# Patient Record
Sex: MALE | Race: WHITE | NOT HISPANIC OR LATINO | Employment: OTHER | URBAN - METROPOLITAN AREA
[De-identification: names, ages, dates, MRNs, and addresses within clinical notes are randomized per-mention and may not be internally consistent; named-entity substitution may affect disease eponyms.]

---

## 2018-01-29 ENCOUNTER — OFFICE VISIT CONVERTED (OUTPATIENT)
Dept: ORTHOPEDIC SURGERY | Facility: CLINIC | Age: 63
End: 2018-01-29
Attending: ORTHOPAEDIC SURGERY

## 2019-02-25 ENCOUNTER — HOSPITAL ENCOUNTER (OUTPATIENT)
Dept: SLEEP MEDICINE | Facility: HOSPITAL | Age: 64
Discharge: HOME | End: 2019-02-25
Attending: INTERNAL MEDICINE

## 2019-04-02 ENCOUNTER — OFFICE VISIT CONVERTED (OUTPATIENT)
Dept: SURGERY | Facility: CLINIC | Age: 64
End: 2019-04-02
Attending: SURGERY

## 2019-05-09 ENCOUNTER — HOSPITAL ENCOUNTER (OUTPATIENT)
Dept: PERIOP | Facility: HOSPITAL | Age: 64
Setting detail: HOSPITAL OUTPATIENT SURGERY
Discharge: HOME | End: 2019-05-09
Attending: SURGERY

## 2019-05-09 LAB
ANION GAP SERPL CALC-SCNC: 15 MMOL/L (ref 8–19)
BUN SERPL-MCNC: 12 MG/DL (ref 5–25)
BUN/CREAT SERPL: 11 {RATIO} (ref 6–20)
CALCIUM SERPL-MCNC: 8.8 MG/DL (ref 8.7–10.4)
CHLORIDE SERPL-SCNC: 104 MMOL/L (ref 99–111)
CONV CO2: 25 MMOL/L (ref 22–32)
CREAT UR-MCNC: 1.05 MG/DL (ref 0.7–1.2)
GFR SERPLBLD BASED ON 1.73 SQ M-ARVRAT: >60 ML/MIN/{1.73_M2}
GLUCOSE SERPL-MCNC: 111 MG/DL (ref 70–99)
OSMOLALITY SERPL CALC.SUM OF ELEC: 290 MOSM/KG (ref 273–304)
POTASSIUM SERPL-SCNC: 4.2 MMOL/L (ref 3.5–5.3)
SODIUM SERPL-SCNC: 140 MMOL/L (ref 135–147)

## 2019-05-16 ENCOUNTER — OFFICE VISIT CONVERTED (OUTPATIENT)
Dept: SURGERY | Facility: CLINIC | Age: 64
End: 2019-05-16
Attending: SURGERY

## 2019-05-23 ENCOUNTER — CONVERSION ENCOUNTER (OUTPATIENT)
Dept: SURGERY | Facility: CLINIC | Age: 64
End: 2019-05-23

## 2019-05-23 ENCOUNTER — OFFICE VISIT CONVERTED (OUTPATIENT)
Dept: SURGERY | Facility: CLINIC | Age: 64
End: 2019-05-23
Attending: SURGERY

## 2019-07-16 ENCOUNTER — HOSPITAL ENCOUNTER (OUTPATIENT)
Dept: LAB | Facility: HOSPITAL | Age: 64
Discharge: HOME | End: 2019-07-16
Attending: INTERNAL MEDICINE

## 2019-07-16 LAB
25(OH)D3 SERPL-MCNC: 57.3 NG/ML (ref 30–100)
ALBUMIN SERPL-MCNC: 4.4 G/DL (ref 3.5–5)
ALBUMIN/GLOB SERPL: 1.7 {RATIO} (ref 1.4–2.6)
ALP SERPL-CCNC: 59 U/L (ref 56–155)
ALT SERPL-CCNC: 29 U/L (ref 10–40)
ANION GAP SERPL CALC-SCNC: 17 MMOL/L (ref 8–19)
AST SERPL-CCNC: 26 U/L (ref 15–50)
BASOPHILS # BLD AUTO: 0.03 10*3/UL (ref 0–0.2)
BASOPHILS NFR BLD AUTO: 0.5 % (ref 0–3)
BILIRUB SERPL-MCNC: 0.72 MG/DL (ref 0.2–1.3)
BUN SERPL-MCNC: 11 MG/DL (ref 5–25)
BUN/CREAT SERPL: 12 {RATIO} (ref 6–20)
CALCIUM SERPL-MCNC: 9 MG/DL (ref 8.7–10.4)
CHLORIDE SERPL-SCNC: 102 MMOL/L (ref 99–111)
CONV ABS IMM GRAN: 0.01 10*3/UL (ref 0–0.2)
CONV CO2: 25 MMOL/L (ref 22–32)
CONV IMMATURE GRAN: 0.2 % (ref 0–1.8)
CONV TOTAL PROTEIN: 7 G/DL (ref 6.3–8.2)
CREAT UR-MCNC: 0.95 MG/DL (ref 0.7–1.2)
DEPRECATED RDW RBC AUTO: 45.5 FL (ref 35.1–43.9)
EOSINOPHIL # BLD AUTO: 0.11 10*3/UL (ref 0–0.7)
EOSINOPHIL # BLD AUTO: 1.8 % (ref 0–7)
ERYTHROCYTE [DISTWIDTH] IN BLOOD BY AUTOMATED COUNT: 13.2 % (ref 11.6–14.4)
FOLATE SERPL-MCNC: 16.3 NG/ML (ref 4.8–20)
GFR SERPLBLD BASED ON 1.73 SQ M-ARVRAT: >60 ML/MIN/{1.73_M2}
GLOBULIN UR ELPH-MCNC: 2.6 G/DL (ref 2–3.5)
GLUCOSE SERPL-MCNC: 100 MG/DL (ref 70–99)
HBA1C MFR BLD: 17.7 G/DL (ref 14–18)
HCT VFR BLD AUTO: 52 % (ref 42–52)
LYMPHOCYTES # BLD AUTO: 1.87 10*3/UL (ref 1–5)
MCH RBC QN AUTO: 31.5 PG (ref 27–31)
MCHC RBC AUTO-ENTMCNC: 34 G/DL (ref 33–37)
MCV RBC AUTO: 92.5 FL (ref 80–96)
MONOCYTES # BLD AUTO: 0.45 10*3/UL (ref 0.2–1.2)
MONOCYTES NFR BLD AUTO: 7.2 % (ref 3–10)
NEUTROPHILS # BLD AUTO: 3.8 10*3/UL (ref 2–8)
NEUTROPHILS NFR BLD AUTO: 60.5 % (ref 30–85)
NRBC CBCN: 0 % (ref 0–0.7)
OSMOLALITY SERPL CALC.SUM OF ELEC: 289 MOSM/KG (ref 273–304)
PLATELET # BLD AUTO: 171 10*3/UL (ref 130–400)
PMV BLD AUTO: 11.2 FL (ref 9.4–12.4)
POTASSIUM SERPL-SCNC: 4.1 MMOL/L (ref 3.5–5.3)
RBC # BLD AUTO: 5.62 10*6/UL (ref 4.7–6.1)
SODIUM SERPL-SCNC: 140 MMOL/L (ref 135–147)
T4 FREE SERPL-MCNC: 1.1 NG/DL (ref 0.9–1.8)
TESTOST SERPL-MCNC: 838 NG/DL (ref 193–740)
TSH SERPL-ACNC: 0.75 M[IU]/L (ref 0.27–4.2)
VARIANT LYMPHS NFR BLD MANUAL: 29.8 % (ref 20–45)
VIT B12 SERPL-MCNC: 305 PG/ML (ref 211–911)
WBC # BLD AUTO: 6.27 10*3/UL (ref 4.8–10.8)

## 2019-09-04 ENCOUNTER — HOSPITAL ENCOUNTER (OUTPATIENT)
Dept: SURGERY | Facility: HOSPITAL | Age: 64
Setting detail: HOSPITAL OUTPATIENT SURGERY
Discharge: HOME | End: 2019-09-04
Attending: SURGERY

## 2019-11-04 ENCOUNTER — HOSPITAL ENCOUNTER (OUTPATIENT)
Dept: LAB | Facility: HOSPITAL | Age: 64
Discharge: HOME | End: 2019-11-04
Attending: INTERNAL MEDICINE

## 2019-11-04 LAB
ALBUMIN SERPL-MCNC: 4.5 G/DL (ref 3.5–5)
ALBUMIN/GLOB SERPL: 1.8 {RATIO} (ref 1.4–2.6)
ALP SERPL-CCNC: 48 U/L (ref 56–155)
ALT SERPL-CCNC: 31 U/L (ref 10–40)
ANION GAP SERPL CALC-SCNC: 22 MMOL/L (ref 8–19)
AST SERPL-CCNC: 26 U/L (ref 15–50)
BASOPHILS # BLD AUTO: 0.03 10*3/UL (ref 0–0.2)
BASOPHILS NFR BLD AUTO: 0.7 % (ref 0–3)
BILIRUB SERPL-MCNC: 0.79 MG/DL (ref 0.2–1.3)
BUN SERPL-MCNC: 13 MG/DL (ref 5–25)
BUN/CREAT SERPL: 11 {RATIO} (ref 6–20)
CALCIUM SERPL-MCNC: 9.4 MG/DL (ref 8.7–10.4)
CHLORIDE SERPL-SCNC: 102 MMOL/L (ref 99–111)
CHOLEST SERPL-MCNC: 111 MG/DL (ref 107–200)
CHOLEST/HDLC SERPL: 2.5 {RATIO} (ref 3–6)
CONV ABS IMM GRAN: 0.01 10*3/UL (ref 0–0.2)
CONV CO2: 23 MMOL/L (ref 22–32)
CONV IMMATURE GRAN: 0.2 % (ref 0–1.8)
CONV TOTAL PROTEIN: 7 G/DL (ref 6.3–8.2)
CREAT UR-MCNC: 1.21 MG/DL (ref 0.7–1.2)
DEPRECATED RDW RBC AUTO: 52 FL (ref 35.1–43.9)
EOSINOPHIL # BLD AUTO: 0.07 10*3/UL (ref 0–0.7)
EOSINOPHIL # BLD AUTO: 1.5 % (ref 0–7)
ERYTHROCYTE [DISTWIDTH] IN BLOOD BY AUTOMATED COUNT: 15.2 % (ref 11.6–14.4)
EST. AVERAGE GLUCOSE BLD GHB EST-MCNC: 114 MG/DL
GFR SERPLBLD BASED ON 1.73 SQ M-ARVRAT: >60 ML/MIN/{1.73_M2}
GLOBULIN UR ELPH-MCNC: 2.5 G/DL (ref 2–3.5)
GLUCOSE SERPL-MCNC: 100 MG/DL (ref 70–99)
HBA1C MFR BLD: 5.6 % (ref 3.5–5.7)
HCT VFR BLD AUTO: 54.4 % (ref 42–52)
HDLC SERPL-MCNC: 45 MG/DL (ref 40–60)
HGB BLD-MCNC: 17.9 G/DL (ref 14–18)
LDLC SERPL CALC-MCNC: 46 MG/DL (ref 70–100)
LYMPHOCYTES # BLD AUTO: 1.49 10*3/UL (ref 1–5)
LYMPHOCYTES NFR BLD AUTO: 32.4 % (ref 20–45)
MCH RBC QN AUTO: 30.8 PG (ref 27–31)
MCHC RBC AUTO-ENTMCNC: 32.9 G/DL (ref 33–37)
MCV RBC AUTO: 93.5 FL (ref 80–96)
MONOCYTES # BLD AUTO: 0.39 10*3/UL (ref 0.2–1.2)
MONOCYTES NFR BLD AUTO: 8.5 % (ref 3–10)
NEUTROPHILS # BLD AUTO: 2.61 10*3/UL (ref 2–8)
NEUTROPHILS NFR BLD AUTO: 56.7 % (ref 30–85)
NRBC CBCN: 0 % (ref 0–0.7)
OSMOLALITY SERPL CALC.SUM OF ELEC: 294 MOSM/KG (ref 273–304)
PLATELET # BLD AUTO: 147 10*3/UL (ref 130–400)
PMV BLD AUTO: 11.4 FL (ref 9.4–12.4)
POTASSIUM SERPL-SCNC: 4.5 MMOL/L (ref 3.5–5.3)
PSA SERPL-MCNC: 2.06 NG/ML (ref 0–4)
RBC # BLD AUTO: 5.82 10*6/UL (ref 4.7–6.1)
SODIUM SERPL-SCNC: 142 MMOL/L (ref 135–147)
TRIGL SERPL-MCNC: 102 MG/DL (ref 40–150)
VLDLC SERPL-MCNC: 20 MG/DL (ref 5–37)
WBC # BLD AUTO: 4.6 10*3/UL (ref 4.8–10.8)

## 2019-11-05 ENCOUNTER — HOSPITAL ENCOUNTER (OUTPATIENT)
Dept: LAB | Facility: HOSPITAL | Age: 64
Discharge: HOME | End: 2019-11-05
Attending: INTERNAL MEDICINE

## 2019-11-05 LAB
T4 FREE SERPL-MCNC: 1.1 NG/DL (ref 0.9–1.8)
TESTOST SERPL-MCNC: 999 NG/DL (ref 193–740)
TSH SERPL-ACNC: 0.66 M[IU]/L (ref 0.27–4.2)

## 2019-11-06 ENCOUNTER — HOSPITAL ENCOUNTER (OUTPATIENT)
Dept: INFUSION THERAPY | Facility: HOSPITAL | Age: 64
Discharge: HOME | End: 2019-11-06
Attending: INTERNAL MEDICINE

## 2019-11-06 LAB
HCT VFR BLD AUTO: 50.8 % (ref 42–52)
HGB BLD-MCNC: 17.4 G/DL (ref 14–18)

## 2020-01-07 ENCOUNTER — HOSPITAL ENCOUNTER (OUTPATIENT)
Dept: LAB | Facility: HOSPITAL | Age: 65
Discharge: HOME | End: 2020-01-07
Attending: INTERNAL MEDICINE

## 2020-01-07 LAB
ALBUMIN SERPL-MCNC: 4.4 G/DL (ref 3.5–5)
ALBUMIN/GLOB SERPL: 1.8 {RATIO} (ref 1.4–2.6)
ALP SERPL-CCNC: 54 U/L (ref 56–155)
ALT SERPL-CCNC: 27 U/L (ref 10–40)
ANION GAP SERPL CALC-SCNC: 16 MMOL/L (ref 8–19)
AST SERPL-CCNC: 23 U/L (ref 15–50)
BASOPHILS # BLD AUTO: 0.02 10*3/UL (ref 0–0.2)
BASOPHILS NFR BLD AUTO: 0.4 % (ref 0–3)
BILIRUB SERPL-MCNC: 0.63 MG/DL (ref 0.2–1.3)
BUN SERPL-MCNC: 15 MG/DL (ref 5–25)
BUN/CREAT SERPL: 14 {RATIO} (ref 6–20)
CALCIUM SERPL-MCNC: 9.1 MG/DL (ref 8.7–10.4)
CHLORIDE SERPL-SCNC: 102 MMOL/L (ref 99–111)
CONV ABS IMM GRAN: 0.01 10*3/UL (ref 0–0.2)
CONV CO2: 24 MMOL/L (ref 22–32)
CONV IMMATURE GRAN: 0.2 % (ref 0–1.8)
CONV TOTAL PROTEIN: 6.9 G/DL (ref 6.3–8.2)
CREAT UR-MCNC: 1.05 MG/DL (ref 0.7–1.2)
DEPRECATED RDW RBC AUTO: 43.5 FL (ref 35.1–43.9)
EOSINOPHIL # BLD AUTO: 0.08 10*3/UL (ref 0–0.7)
EOSINOPHIL # BLD AUTO: 1.6 % (ref 0–7)
ERYTHROCYTE [DISTWIDTH] IN BLOOD BY AUTOMATED COUNT: 13.2 % (ref 11.6–14.4)
GFR SERPLBLD BASED ON 1.73 SQ M-ARVRAT: >60 ML/MIN/{1.73_M2}
GLOBULIN UR ELPH-MCNC: 2.5 G/DL (ref 2–3.5)
GLUCOSE SERPL-MCNC: 118 MG/DL (ref 70–99)
HCT VFR BLD AUTO: 45.5 % (ref 42–52)
HGB BLD-MCNC: 15.7 G/DL (ref 14–18)
LYMPHOCYTES # BLD AUTO: 1.69 10*3/UL (ref 1–5)
LYMPHOCYTES NFR BLD AUTO: 33.6 % (ref 20–45)
MCH RBC QN AUTO: 31.3 PG (ref 27–31)
MCHC RBC AUTO-ENTMCNC: 34.5 G/DL (ref 33–37)
MCV RBC AUTO: 90.8 FL (ref 80–96)
MONOCYTES # BLD AUTO: 0.31 10*3/UL (ref 0.2–1.2)
MONOCYTES NFR BLD AUTO: 6.2 % (ref 3–10)
NEUTROPHILS # BLD AUTO: 2.92 10*3/UL (ref 2–8)
NEUTROPHILS NFR BLD AUTO: 58 % (ref 30–85)
NRBC CBCN: 0 % (ref 0–0.7)
OSMOLALITY SERPL CALC.SUM OF ELEC: 288 MOSM/KG (ref 273–304)
PLATELET # BLD AUTO: 203 10*3/UL (ref 130–400)
PMV BLD AUTO: 10.1 FL (ref 9.4–12.4)
POTASSIUM SERPL-SCNC: 4.2 MMOL/L (ref 3.5–5.3)
RBC # BLD AUTO: 5.01 10*6/UL (ref 4.7–6.1)
SODIUM SERPL-SCNC: 138 MMOL/L (ref 135–147)
T4 FREE SERPL-MCNC: 1 NG/DL (ref 0.9–1.8)
TESTOST SERPL-MCNC: 264 NG/DL (ref 193–740)
TSH SERPL-ACNC: 0.9 M[IU]/L (ref 0.27–4.2)
WBC # BLD AUTO: 5.03 10*3/UL (ref 4.8–10.8)

## 2020-07-06 ENCOUNTER — HOSPITAL ENCOUNTER (OUTPATIENT)
Dept: LAB | Facility: HOSPITAL | Age: 65
Discharge: HOME | End: 2020-07-06
Attending: INTERNAL MEDICINE

## 2020-07-06 LAB
ALBUMIN SERPL-MCNC: 4.3 G/DL (ref 3.5–5)
ALBUMIN/GLOB SERPL: 1.7 {RATIO} (ref 1.4–2.6)
ALP SERPL-CCNC: 57 U/L (ref 56–155)
ALT SERPL-CCNC: 27 U/L (ref 10–40)
ANION GAP SERPL CALC-SCNC: 18 MMOL/L (ref 8–19)
AST SERPL-CCNC: 23 U/L (ref 15–50)
BASOPHILS # BLD AUTO: 0.03 10*3/UL (ref 0–0.2)
BASOPHILS NFR BLD AUTO: 0.7 % (ref 0–3)
BILIRUB SERPL-MCNC: 0.37 MG/DL (ref 0.2–1.3)
BUN SERPL-MCNC: 15 MG/DL (ref 5–25)
BUN/CREAT SERPL: 12 {RATIO} (ref 6–20)
CALCIUM SERPL-MCNC: 9.2 MG/DL (ref 8.7–10.4)
CHLORIDE SERPL-SCNC: 104 MMOL/L (ref 99–111)
CHOLEST SERPL-MCNC: 138 MG/DL (ref 107–200)
CHOLEST/HDLC SERPL: 2.7 {RATIO} (ref 3–6)
CONV ABS IMM GRAN: 0.01 10*3/UL (ref 0–0.2)
CONV CO2: 21 MMOL/L (ref 22–32)
CONV IMMATURE GRAN: 0.2 % (ref 0–1.8)
CONV TOTAL PROTEIN: 6.9 G/DL (ref 6.3–8.2)
CREAT UR-MCNC: 1.29 MG/DL (ref 0.7–1.2)
DEPRECATED RDW RBC AUTO: 44.7 FL (ref 35.1–43.9)
EOSINOPHIL # BLD AUTO: 0.06 10*3/UL (ref 0–0.7)
EOSINOPHIL # BLD AUTO: 1.4 % (ref 0–7)
ERYTHROCYTE [DISTWIDTH] IN BLOOD BY AUTOMATED COUNT: 12.7 % (ref 11.6–14.4)
FERRITIN SERPL-MCNC: 230 NG/ML (ref 30–300)
GFR SERPLBLD BASED ON 1.73 SQ M-ARVRAT: 58 ML/MIN/{1.73_M2}
GLOBULIN UR ELPH-MCNC: 2.6 G/DL (ref 2–3.5)
GLUCOSE SERPL-MCNC: 113 MG/DL (ref 70–99)
HCT VFR BLD AUTO: 43.1 % (ref 42–52)
HDLC SERPL-MCNC: 51 MG/DL (ref 40–60)
HGB BLD-MCNC: 14.5 G/DL (ref 14–18)
IRON SATN MFR SERPL: 23 % (ref 20–55)
IRON SERPL-MCNC: 91 UG/DL (ref 70–180)
LDLC SERPL CALC-MCNC: 60 MG/DL (ref 70–100)
LYMPHOCYTES # BLD AUTO: 1.84 10*3/UL (ref 1–5)
LYMPHOCYTES NFR BLD AUTO: 43 % (ref 20–45)
MCH RBC QN AUTO: 32.2 PG (ref 27–31)
MCHC RBC AUTO-ENTMCNC: 33.6 G/DL (ref 33–37)
MCV RBC AUTO: 95.6 FL (ref 80–96)
MONOCYTES # BLD AUTO: 0.19 10*3/UL (ref 0.2–1.2)
MONOCYTES NFR BLD AUTO: 4.4 % (ref 3–10)
NEUTROPHILS # BLD AUTO: 2.15 10*3/UL (ref 2–8)
NEUTROPHILS NFR BLD AUTO: 50.3 % (ref 30–85)
NRBC CBCN: 0 % (ref 0–0.7)
OSMOLALITY SERPL CALC.SUM OF ELEC: 288 MOSM/KG (ref 273–304)
PLATELET # BLD AUTO: 161 10*3/UL (ref 130–400)
PMV BLD AUTO: 10.3 FL (ref 9.4–12.4)
POTASSIUM SERPL-SCNC: 5 MMOL/L (ref 3.5–5.3)
RBC # BLD AUTO: 4.51 10*6/UL (ref 4.7–6.1)
SODIUM SERPL-SCNC: 138 MMOL/L (ref 135–147)
T4 FREE SERPL-MCNC: 1.1 NG/DL (ref 0.9–1.8)
TIBC SERPL-MCNC: 402 UG/DL (ref 245–450)
TRANSFERRIN SERPL-MCNC: 281 MG/DL (ref 215–365)
TRIGL SERPL-MCNC: 137 MG/DL (ref 40–150)
TSH SERPL-ACNC: 1.37 M[IU]/L (ref 0.27–4.2)
VLDLC SERPL-MCNC: 27 MG/DL (ref 5–37)
WBC # BLD AUTO: 4.28 10*3/UL (ref 4.8–10.8)

## 2020-09-23 ENCOUNTER — OUTSIDE FACILITY SERVICE (OUTPATIENT)
Dept: SLEEP MEDICINE | Facility: HOSPITAL | Age: 65
End: 2020-09-23

## 2020-09-23 ENCOUNTER — HOSPITAL ENCOUNTER (OUTPATIENT)
Dept: SLEEP MEDICINE | Facility: HOSPITAL | Age: 65
Discharge: HOME | End: 2020-09-23
Attending: INTERNAL MEDICINE

## 2020-09-23 PROCEDURE — 99203 OFFICE O/P NEW LOW 30 MIN: CPT | Performed by: INTERNAL MEDICINE

## 2020-12-29 ENCOUNTER — HOSPITAL ENCOUNTER (OUTPATIENT)
Dept: LAB | Facility: HOSPITAL | Age: 65
Discharge: HOME | End: 2020-12-29
Attending: INTERNAL MEDICINE

## 2020-12-29 LAB
ALBUMIN SERPL-MCNC: 4.5 G/DL (ref 3.5–5)
ALBUMIN/GLOB SERPL: 1.9 {RATIO} (ref 1.4–2.6)
ALP SERPL-CCNC: 53 U/L (ref 56–155)
ALT SERPL-CCNC: 24 U/L (ref 10–40)
ANION GAP SERPL CALC-SCNC: 19 MMOL/L (ref 8–19)
AST SERPL-CCNC: 24 U/L (ref 15–50)
BASOPHILS # BLD AUTO: 0.01 10*3/UL (ref 0–0.2)
BASOPHILS NFR BLD AUTO: 0.3 % (ref 0–3)
BILIRUB SERPL-MCNC: 0.39 MG/DL (ref 0.2–1.3)
BUN SERPL-MCNC: 16 MG/DL (ref 5–25)
BUN/CREAT SERPL: 15 {RATIO} (ref 6–20)
CALCIUM SERPL-MCNC: 9.3 MG/DL (ref 8.7–10.4)
CHLORIDE SERPL-SCNC: 103 MMOL/L (ref 99–111)
CONV ABS IMM GRAN: 0.01 10*3/UL (ref 0–0.2)
CONV CO2: 22 MMOL/L (ref 22–32)
CONV IMMATURE GRAN: 0.3 % (ref 0–1.8)
CONV TOTAL PROTEIN: 6.9 G/DL (ref 6.3–8.2)
CREAT UR-MCNC: 1.07 MG/DL (ref 0.7–1.2)
DEPRECATED RDW RBC AUTO: 46.3 FL (ref 35.1–43.9)
EOSINOPHIL # BLD AUTO: 0.05 10*3/UL (ref 0–0.7)
EOSINOPHIL # BLD AUTO: 1.3 % (ref 0–7)
ERYTHROCYTE [DISTWIDTH] IN BLOOD BY AUTOMATED COUNT: 13.2 % (ref 11.6–14.4)
EST. AVERAGE GLUCOSE BLD GHB EST-MCNC: 117 MG/DL
GFR SERPLBLD BASED ON 1.73 SQ M-ARVRAT: >60 ML/MIN/{1.73_M2}
GLOBULIN UR ELPH-MCNC: 2.4 G/DL (ref 2–3.5)
GLUCOSE SERPL-MCNC: 107 MG/DL (ref 70–99)
HBA1C MFR BLD: 5.7 % (ref 3.5–5.7)
HCT VFR BLD AUTO: 41 % (ref 42–52)
HGB BLD-MCNC: 14.1 G/DL (ref 14–18)
LYMPHOCYTES # BLD AUTO: 1.72 10*3/UL (ref 1–5)
LYMPHOCYTES NFR BLD AUTO: 43 % (ref 20–45)
MCH RBC QN AUTO: 32.6 PG (ref 27–31)
MCHC RBC AUTO-ENTMCNC: 34.4 G/DL (ref 33–37)
MCV RBC AUTO: 94.9 FL (ref 80–96)
MONOCYTES # BLD AUTO: 0.16 10*3/UL (ref 0.2–1.2)
MONOCYTES NFR BLD AUTO: 4 % (ref 3–10)
NEUTROPHILS # BLD AUTO: 2.05 10*3/UL (ref 2–8)
NEUTROPHILS NFR BLD AUTO: 51.1 % (ref 30–85)
NRBC CBCN: 0 % (ref 0–0.7)
OSMOLALITY SERPL CALC.SUM OF ELEC: 290 MOSM/KG (ref 273–304)
PLATELET # BLD AUTO: 151 10*3/UL (ref 130–400)
PMV BLD AUTO: 9.6 FL (ref 9.4–12.4)
POTASSIUM SERPL-SCNC: 4.6 MMOL/L (ref 3.5–5.3)
PSA SERPL-MCNC: 4.82 NG/ML (ref 0–4)
RBC # BLD AUTO: 4.32 10*6/UL (ref 4.7–6.1)
SODIUM SERPL-SCNC: 139 MMOL/L (ref 135–147)
T4 FREE SERPL-MCNC: 1.2 NG/DL (ref 0.9–1.8)
TSH SERPL-ACNC: 1.81 M[IU]/L (ref 0.27–4.2)
WBC # BLD AUTO: 4 10*3/UL (ref 4.8–10.8)

## 2021-02-04 ENCOUNTER — HOSPITAL ENCOUNTER (OUTPATIENT)
Dept: LAB | Facility: HOSPITAL | Age: 66
Discharge: HOME | End: 2021-02-04
Attending: INTERNAL MEDICINE

## 2021-02-04 LAB — PSA SERPL-MCNC: 1.49 NG/ML (ref 0–4)

## 2021-05-15 VITALS — BODY MASS INDEX: 36.96 KG/M2 | HEIGHT: 66 IN | WEIGHT: 230 LBS | RESPIRATION RATE: 16 BRPM

## 2021-05-15 VITALS — WEIGHT: 230 LBS | RESPIRATION RATE: 16 BRPM | HEIGHT: 66 IN | BODY MASS INDEX: 36.96 KG/M2

## 2021-05-16 VITALS — HEIGHT: 67 IN | HEART RATE: 90 BPM | BODY MASS INDEX: 36.1 KG/M2 | WEIGHT: 230 LBS | OXYGEN SATURATION: 95 %

## 2021-07-21 RX ORDER — SPIRONOLACTONE 25 MG/1
25 TABLET ORAL DAILY
Qty: 90 TABLET | Refills: 3 | Status: SHIPPED | OUTPATIENT
Start: 2021-07-21 | End: 2022-01-05 | Stop reason: SDUPTHER

## 2021-07-21 RX ORDER — SPIRONOLACTONE 25 MG/1
TABLET ORAL
COMMUNITY
End: 2021-07-21 | Stop reason: SDUPTHER

## 2021-07-21 RX ORDER — CITALOPRAM 20 MG/1
20 TABLET ORAL DAILY
Qty: 90 TABLET | Refills: 3 | Status: SHIPPED | OUTPATIENT
Start: 2021-07-21 | End: 2022-03-30

## 2021-07-21 RX ORDER — ROSUVASTATIN CALCIUM 10 MG/1
TABLET, COATED ORAL
COMMUNITY
Start: 2021-07-02 | End: 2021-07-21 | Stop reason: SDUPTHER

## 2021-07-21 RX ORDER — CITALOPRAM 20 MG/1
TABLET ORAL
COMMUNITY
Start: 2021-06-14 | End: 2021-07-21 | Stop reason: SDUPTHER

## 2021-07-21 RX ORDER — ROSUVASTATIN CALCIUM 10 MG/1
10 TABLET, COATED ORAL DAILY
Qty: 90 TABLET | Refills: 3 | Status: SHIPPED | OUTPATIENT
Start: 2021-07-21 | End: 2022-01-05 | Stop reason: SDUPTHER

## 2021-09-14 ENCOUNTER — TRANSCRIBE ORDERS (OUTPATIENT)
Dept: LAB | Facility: HOSPITAL | Age: 66
End: 2021-09-14

## 2021-09-14 ENCOUNTER — LAB (OUTPATIENT)
Dept: LAB | Facility: HOSPITAL | Age: 66
End: 2021-09-14

## 2021-09-14 DIAGNOSIS — R73.01 IMPAIRED FASTING GLUCOSE: ICD-10-CM

## 2021-09-14 DIAGNOSIS — E78.00 PURE HYPERCHOLESTEROLEMIA, UNSPECIFIED: ICD-10-CM

## 2021-09-14 DIAGNOSIS — I10 ESSENTIAL HYPERTENSION: Primary | ICD-10-CM

## 2021-09-14 DIAGNOSIS — R97.20 ELEVATED PROSTATE SPECIFIC ANTIGEN (PSA): ICD-10-CM

## 2021-09-14 DIAGNOSIS — D64.9 ANEMIA, UNSPECIFIED TYPE: ICD-10-CM

## 2021-09-14 DIAGNOSIS — D75.1 POLYCYTHEMIA: ICD-10-CM

## 2021-09-14 DIAGNOSIS — I10 ESSENTIAL HYPERTENSION: ICD-10-CM

## 2021-09-14 LAB
ALBUMIN SERPL-MCNC: 4.7 G/DL (ref 3.5–5.2)
ALBUMIN/GLOB SERPL: 1.9 G/DL
ALP SERPL-CCNC: 59 U/L (ref 39–117)
ALT SERPL W P-5'-P-CCNC: 29 U/L (ref 1–41)
ANION GAP SERPL CALCULATED.3IONS-SCNC: 11.1 MMOL/L (ref 5–15)
AST SERPL-CCNC: 23 U/L (ref 1–40)
BILIRUB SERPL-MCNC: 0.5 MG/DL (ref 0–1.2)
BUN SERPL-MCNC: 14 MG/DL (ref 8–23)
BUN/CREAT SERPL: 13.2 (ref 7–25)
CALCIUM SPEC-SCNC: 9.3 MG/DL (ref 8.6–10.5)
CHLORIDE SERPL-SCNC: 99 MMOL/L (ref 98–107)
CHOLEST SERPL-MCNC: 135 MG/DL (ref 0–200)
CO2 SERPL-SCNC: 25.9 MMOL/L (ref 22–29)
CREAT SERPL-MCNC: 1.06 MG/DL (ref 0.76–1.27)
DEPRECATED RDW RBC AUTO: 45.7 FL (ref 37–54)
EOSINOPHIL # BLD MANUAL: 0.03 10*3/MM3 (ref 0–0.4)
EOSINOPHIL NFR BLD MANUAL: 1 % (ref 0.3–6.2)
ERYTHROCYTE [DISTWIDTH] IN BLOOD BY AUTOMATED COUNT: 13.5 % (ref 12.3–15.4)
GFR SERPL CREATININE-BSD FRML MDRD: 70 ML/MIN/1.73
GLOBULIN UR ELPH-MCNC: 2.5 GM/DL
GLUCOSE SERPL-MCNC: 112 MG/DL (ref 65–99)
HBA1C MFR BLD: 6.1 % (ref 4.8–5.6)
HCT VFR BLD AUTO: 40.7 % (ref 37.5–51)
HDLC SERPL-MCNC: 46 MG/DL (ref 40–60)
HGB BLD-MCNC: 14.4 G/DL (ref 13–17.7)
LDLC SERPL CALC-MCNC: 60 MG/DL (ref 0–100)
LDLC/HDLC SERPL: 1.17 {RATIO}
LYMPHOCYTES # BLD MANUAL: 1.72 10*3/MM3 (ref 0.7–3.1)
LYMPHOCYTES NFR BLD MANUAL: 4 % (ref 5–12)
LYMPHOCYTES NFR BLD MANUAL: 52 % (ref 19.6–45.3)
MCH RBC QN AUTO: 33 PG (ref 26.6–33)
MCHC RBC AUTO-ENTMCNC: 35.4 G/DL (ref 31.5–35.7)
MCV RBC AUTO: 93.3 FL (ref 79–97)
MONOCYTES # BLD AUTO: 0.13 10*3/MM3 (ref 0.1–0.9)
NEUTROPHILS # BLD AUTO: 1.42 10*3/MM3 (ref 1.7–7)
NEUTROPHILS NFR BLD MANUAL: 43 % (ref 42.7–76)
PLAT MORPH BLD: NORMAL
PLATELET # BLD AUTO: 171 10*3/MM3 (ref 140–450)
PMV BLD AUTO: 9.4 FL (ref 6–12)
POTASSIUM SERPL-SCNC: 4.8 MMOL/L (ref 3.5–5.2)
PROT SERPL-MCNC: 7.2 G/DL (ref 6–8.5)
RBC # BLD AUTO: 4.36 10*6/MM3 (ref 4.14–5.8)
RBC MORPH BLD: NORMAL
SODIUM SERPL-SCNC: 136 MMOL/L (ref 136–145)
TRIGL SERPL-MCNC: 175 MG/DL (ref 0–150)
VLDLC SERPL-MCNC: 29 MG/DL (ref 5–40)
WBC # BLD AUTO: 3.31 10*3/MM3 (ref 3.4–10.8)
WBC MORPH BLD: NORMAL

## 2021-09-14 PROCEDURE — 85007 BL SMEAR W/DIFF WBC COUNT: CPT

## 2021-09-14 PROCEDURE — 85025 COMPLETE CBC W/AUTO DIFF WBC: CPT

## 2021-09-14 PROCEDURE — 83036 HEMOGLOBIN GLYCOSYLATED A1C: CPT

## 2021-09-14 PROCEDURE — 36415 COLL VENOUS BLD VENIPUNCTURE: CPT

## 2021-09-14 PROCEDURE — 80053 COMPREHEN METABOLIC PANEL: CPT

## 2021-09-14 PROCEDURE — 80061 LIPID PANEL: CPT

## 2021-09-14 NOTE — PROGRESS NOTES
"Chief Complaint/ HPI:   Follow-up (7 month/labs), Hypertension, and Vitamin D Deficiency  Hernia issues, also concerned about right shoulder pain with internal rotation of the shoulder in abduction,    Objective   Vital Signs  Vitals:    09/16/21 1417   BP: 136/86   BP Location: Left arm   Patient Position: Sitting   Cuff Size: Adult   Pulse: 95   Resp: 18   Temp: 97.4 °F (36.3 °C)   TempSrc: Tympanic   SpO2: 94%   Weight: 109 kg (240 lb 6.4 oz)   Height: 167.6 cm (66\")      Body mass index is 38.8 kg/m².  Review of Systems   Constitutional: Negative.    HENT: Negative.    Eyes: Negative.    Respiratory: Negative.    Cardiovascular: Negative.    Gastrointestinal: Negative.    Endocrine: Negative.    Genitourinary: Negative.    Musculoskeletal: Positive for arthralgias, back pain, joint swelling and myalgias.   Allergic/Immunologic: Negative.    Neurological: Negative.    Hematological: Negative.    Psychiatric/Behavioral: Negative.     some bliss  Muscle issues in r shoulder and r shoulder     Physical Exam  Constitutional:       General: He is not in acute distress.     Appearance: Normal appearance.   HENT:      Head: Normocephalic.      Mouth/Throat:      Mouth: Mucous membranes are moist.   Eyes:      Conjunctiva/sclera: Conjunctivae normal.      Pupils: Pupils are equal, round, and reactive to light.   Cardiovascular:      Rate and Rhythm: Normal rate and regular rhythm.      Pulses: Normal pulses.      Heart sounds: Normal heart sounds.   Pulmonary:      Effort: Pulmonary effort is normal.      Breath sounds: Normal breath sounds.   Abdominal:      General: Abdomen is flat. Bowel sounds are normal.      Palpations: Abdomen is soft.   Musculoskeletal:         General: No swelling. Normal range of motion.      Cervical back: Neck supple.   Skin:     General: Skin is warm and dry.      Coloration: Skin is not jaundiced.   Neurological:      General: No focal deficit present.      Mental Status: He is alert and " oriented to person, place, and time. Mental status is at baseline.   Psychiatric:         Mood and Affect: Mood normal.         Behavior: Behavior normal.         Thought Content: Thought content normal.         Judgment: Judgment normal.     Patient has a ventral hernia linear and small umbilical hernia, abdomen mild tender in the right upper quadrant no liver edge or mass felt, no skin lesions on the right back or shoulder area,  abd obese , soft nt   Alert and o x 3    Result Review :   No results found for: PROBNP, BNP  CMP    CMP 12/29/20 9/14/21   Glucose  112 (A)   Glucose 107 (A)    BUN 16 14   Creatinine 1.07 1.06   eGFR Non African Am  70   Sodium 139 136   Potassium 4.6 4.8   Chloride 103 99   Calcium 9.3 9.3   Albumin 4.5 4.70   Total Bilirubin 0.39 0.5   Alkaline Phosphatase 53 (A) 59   AST (SGOT) 24 23   ALT (SGPT) 24 29   (A) Abnormal value            CBC w/diff    CBC w/Diff 12/29/20   WBC 4.00 (A)   RBC 4.32 (A)   Hemoglobin 14.1   Hematocrit 41.0 (A)   MCV 94.9   MCH 32.6 (A)   MCHC 34.4   RDW 13.2   Platelets 151   Neutrophil Rel % 51.1   Lymphocyte Rel % 43.0   Monocyte Rel % 4.0   Eosinophil Rel % 1.3   Basophil Rel % 0.3   (A) Abnormal value             Lipid Panel    Lipid Panel 9/14/21   Total Cholesterol 135   Triglycerides 175 (A)   HDL Cholesterol 46   VLDL Cholesterol 29   LDL Cholesterol  60   LDL/HDL Ratio 1.17   (A) Abnormal value             Lab Results   Component Value Date    TSH 1.810 12/29/2020    TSH 1.370 07/06/2020    TSH 0.895 01/07/2020      Lab Results   Component Value Date    FREET4 1.2 12/29/2020    FREET4 1.1 07/06/2020    FREET4 1.0 01/07/2020      A1C Last 3 Results    HGBA1C Last 3 Results 12/29/20 9/14/21   Hemoglobin A1C 5.7 6.10 (A)   (A) Abnormal value       Comments are available for some flowsheets but are not being displayed.            PSA    PSA 12/29/20 2/4/21   PSA 4.82 (A) 1.49   (A) Abnormal value                           No diagnosis  found.    Assessment and Plan      Elevated PSA and new diagnosis at 4.8 January 2021, will treat with Cipro, recheck in 6 weeks,-- PSA DOWN TO 1.49, FEB 4, 2021 , PT STOPPED CELEXA WHILE TAKING CIPRO DUE TO DRUG- DRUG INTERACTION, ---    Right shoulder pain, possible impingement bursitis, rotator cuff?  Will refer to physical therapy and consider Ortho if not improving, would like to avoid anti-inflammatories given blood pressure medications and hypertension etc.,, September 2021    Patient complaining of umbilical hernia at times that he has to push and when he goes to the bathroom discussed treatment options with surgical consultation, he currently wants to wait and follow-up at next visit, discussed January 2021, will refer to Adonay Sainz September 2021    Mild reactive depression, Celexa 20 mg , Wellbutrin  MG QD      Polycythemia differential diagnosis includes polycythemia vera versus testosterone replacement the patient is recently started November 2019, patient also on thyroid replacement therapy, discussed risks of all these medications and replacement medications with him at length including heart attack stroke ---Currently on testosterone, blood counts are stable---Hood off testosterone January 2021, hemoglobin down to 14.1 stable    HTN - Quinapril 40 mg twice a day,, doxazosin 4 mg daily at bedtime, spironolactone 25 mg daily---workup to include plasma metanephrine, renin, aldosterone and chemistry panels, August 2018 on normal--    LACUNAR INFARCT L BASAL GANGLIA, OLD , ON CT BRAIN JUNE 2018, -- echocardiogram,Normal ejection fraction otherwise unremarkable July 2018 carotid ultrasound, No significant stenosis July 2018,, Continue high-dose moderate dose statin therapy along with aspirin 81 mg daily, recommendations are to stop NSAIDs and go back to arthritis strength Tylenol to help with blood pressure control and lower risk of stroke etc.,    HA--//Neck pain with radiculopathy---,  June 2018 Worsening over the past 3-6 months, etiology unclear possible related to blood pressure ,--Results reviewed, no significant spinal pathology, no significant stroke on MRI of the brain, right maxillary sinus polyp, July 2018,    S/P JA CHOLRAFITA MAY 2019, [facility]     Jc, on cpap, 2019 , --    r shoulder mass, r/o lipoma--DID SEE ORTHO     elevated chol ---ROUSAVASTATIN 10 MG QD , MUCH IMPROVED     IFG, 105- 110 --Hemoglobin A1c of 5.7, December 2020    C SCOPE 2013, [facility] ,Repeat colonoscopy September 2019, , Colonoscopy noted, one small tubular adenoma    Follow Up   Return in about 6 months (around 3/16/2022).  Patient was given instructions and counseling regarding his condition or for health maintenance advice. Please see specific information pulled into the AVS if appropriate.

## 2021-09-16 ENCOUNTER — OFFICE VISIT (OUTPATIENT)
Dept: INTERNAL MEDICINE | Facility: CLINIC | Age: 66
End: 2021-09-16

## 2021-09-16 VITALS
TEMPERATURE: 97.4 F | RESPIRATION RATE: 18 BRPM | WEIGHT: 240.4 LBS | DIASTOLIC BLOOD PRESSURE: 86 MMHG | SYSTOLIC BLOOD PRESSURE: 136 MMHG | BODY MASS INDEX: 38.63 KG/M2 | HEIGHT: 66 IN | HEART RATE: 95 BPM | OXYGEN SATURATION: 94 %

## 2021-09-16 DIAGNOSIS — Z12.5 SCREENING PSA (PROSTATE SPECIFIC ANTIGEN): Primary | ICD-10-CM

## 2021-09-16 DIAGNOSIS — I10 HYPERTENSION, ESSENTIAL: ICD-10-CM

## 2021-09-16 DIAGNOSIS — R73.01 IFG (IMPAIRED FASTING GLUCOSE): ICD-10-CM

## 2021-09-16 DIAGNOSIS — E78.2 MIXED HYPERLIPIDEMIA: ICD-10-CM

## 2021-09-16 DIAGNOSIS — E66.09 CLASS 2 OBESITY DUE TO EXCESS CALORIES WITHOUT SERIOUS COMORBIDITY WITH BODY MASS INDEX (BMI) OF 39.0 TO 39.9 IN ADULT: ICD-10-CM

## 2021-09-16 DIAGNOSIS — I63.312 CEREBROVASCULAR ACCIDENT (CVA) DUE TO THROMBOSIS OF LEFT MIDDLE CEREBRAL ARTERY (HCC): ICD-10-CM

## 2021-09-16 PROBLEM — E66.812 CLASS 2 OBESITY DUE TO EXCESS CALORIES WITHOUT SERIOUS COMORBIDITY WITH BODY MASS INDEX (BMI) OF 39.0 TO 39.9 IN ADULT: Status: ACTIVE | Noted: 2021-09-16

## 2021-09-16 PROCEDURE — 99214 OFFICE O/P EST MOD 30 MIN: CPT | Performed by: INTERNAL MEDICINE

## 2021-09-16 RX ORDER — MULTIVITAMIN WITH IRON
TABLET ORAL
COMMUNITY
End: 2022-04-26

## 2021-09-16 RX ORDER — AMLODIPINE BESYLATE 5 MG/1
TABLET ORAL
COMMUNITY
End: 2022-04-26

## 2021-09-16 RX ORDER — MELATONIN: COMMUNITY

## 2021-09-16 RX ORDER — LORATADINE 10 MG/1
TABLET ORAL
COMMUNITY

## 2021-09-16 RX ORDER — LISINOPRIL AND HYDROCHLOROTHIAZIDE 20; 12.5 MG/1; MG/1
TABLET ORAL
COMMUNITY
End: 2022-04-26

## 2021-09-16 RX ORDER — ASPIRIN 81 MG/1
TABLET, CHEWABLE ORAL
COMMUNITY

## 2021-09-16 RX ORDER — METOPROLOL SUCCINATE 50 MG/1
TABLET, EXTENDED RELEASE ORAL
COMMUNITY
End: 2022-04-26

## 2021-09-16 RX ORDER — SPIRONOLACTONE 25 MG/1
TABLET ORAL EVERY 24 HOURS
COMMUNITY
End: 2022-03-30

## 2021-09-16 RX ORDER — METRONIDAZOLE 7.5 MG/G
GEL TOPICAL
COMMUNITY
Start: 2021-07-06 | End: 2022-03-30

## 2021-09-16 RX ORDER — LYSINE 600 MG
TABLET ORAL
COMMUNITY
End: 2022-04-26

## 2021-09-16 RX ORDER — OXYBUTYNIN CHLORIDE 10 MG/1
TABLET, EXTENDED RELEASE ORAL
COMMUNITY
End: 2022-07-15

## 2021-09-16 RX ORDER — CITALOPRAM 20 MG/1
TABLET ORAL
COMMUNITY
End: 2022-06-20 | Stop reason: SDUPTHER

## 2021-09-16 RX ORDER — QUINAPRIL 40 MG/1
TABLET ORAL EVERY 12 HOURS SCHEDULED
COMMUNITY
End: 2022-01-05 | Stop reason: SDUPTHER

## 2021-09-16 RX ORDER — CITALOPRAM 10 MG/1
20 TABLET ORAL EVERY 24 HOURS
COMMUNITY
End: 2022-03-30

## 2021-09-16 RX ORDER — ROSUVASTATIN CALCIUM 10 MG/1
TABLET, COATED ORAL
COMMUNITY
End: 2022-03-30

## 2021-10-04 ENCOUNTER — OFFICE VISIT (OUTPATIENT)
Dept: SLEEP MEDICINE | Facility: HOSPITAL | Age: 66
End: 2021-10-04

## 2021-10-04 VITALS
DIASTOLIC BLOOD PRESSURE: 90 MMHG | HEART RATE: 70 BPM | BODY MASS INDEX: 37.48 KG/M2 | WEIGHT: 238.8 LBS | TEMPERATURE: 97.7 F | OXYGEN SATURATION: 96 % | HEIGHT: 67 IN | SYSTOLIC BLOOD PRESSURE: 155 MMHG

## 2021-10-04 DIAGNOSIS — Z99.89 OSA ON CPAP: ICD-10-CM

## 2021-10-04 DIAGNOSIS — E66.09 CLASS 2 OBESITY DUE TO EXCESS CALORIES WITHOUT SERIOUS COMORBIDITY WITH BODY MASS INDEX (BMI) OF 39.0 TO 39.9 IN ADULT: ICD-10-CM

## 2021-10-04 DIAGNOSIS — G47.33 OSA ON CPAP: ICD-10-CM

## 2021-10-04 PROCEDURE — 99213 OFFICE O/P EST LOW 20 MIN: CPT | Performed by: INTERNAL MEDICINE

## 2021-10-04 PROCEDURE — G0463 HOSPITAL OUTPT CLINIC VISIT: HCPCS

## 2021-10-04 NOTE — PROGRESS NOTES
"  [address] [address]  [place]   [place] [address]  Phone: [phone]  Fax: [phone]      SLEEP CLINIC FOLLOW UP PROGRESS NOTE.    Dung YATES Marcin  1955  66 y.o.  male      PCP: Isaac Matta MD      Date of visit: 10/4/2021    Chief Complaint   Patient presents with   • Sleep Apnea   • Obesity       HPI:  This is a 66 y.o. years old patient who has a history of obstructive sleep apnea is here for  the annual compliance follow-up.  Sleep apnea is severe in severity with a AHI of 43/h and in supine 66/hr. Patient is using positive airway pressure therapy with auto CPAP and the symptoms of snoring, non-restorative sleep and daytime excessive sleepiness have improved significantly on the therapy. Normally goes to bed at 11 PM and wakes up at 7 AM.  The patient wakes up 0-1 time(s) during the night and has no problem going back to sleep.  Feels refreshed after waking up.  Patient also denies headaches and nasal congestion.   He is still working in  real estate business    Medications and allergies are reviewed by me and documented in the encounter.     SOCIAL ( habits pertaining to sleep medicine)  History tobacco use:No   History of alcohol use: 20 per week  Caffeine use: 3     REVIEW OF SYSTEMS:   Morgan Sleepiness Scale :Total score: 4   Nasal congestion:No   Dry mouth/nose:No   Post nasal drip; No   Acid reflux/Heartburn:No   Abd bloating:No   Morning headache:No   Anxiety:No   Depression:No    PHYSICAL EXAMINATION:  CONSTITUTIONAL:  Vitals:    10/04/21 0900   BP: 155/90   BP Location: Left arm   Pulse: 70   Temp: 97.7 °F (36.5 °C)   SpO2: 96%   Weight: 108 kg (238 lb 12.8 oz)   Height: 168.9 cm (66.5\")    Body mass index is 37.97 kg/m².   NOSE: nasal passages are clear, no nasal polyps, septum in the midline.  THROAT: throat is clear, oral airway Mallampati class 3  RESP SYSTEM: Breath sounds are normal, no wheezes or crackles  CARDIOVASULAR: Heart rate is " regular without murmur. No edema      Data reviewed:  The Smart card downloaded on 10/4/2021 has been reviewed independently by me for compliance and discussed the data with the patient.   Compliance; 100%  More than 4 hr use, 100%  Average use of the device 8 hours and 2 minutes per night  Residual AHI: 0.7 /hr (goal < 5.0 /hr)  Mask type: Nasal mask  DME: [facility]      ASSESSMENT AND PLAN:  · Obstructive sleep apnea ( G 47.33).  The symptoms of sleep apnea have improved with the device and the treatment.  Patient's compliance with the device is excellent for treatment of sleep apnea.  I have independently reviewed the smart card down load and discussed with the patient the download data and encouarged the patient to continue to use the device.The residual AHI is acceptable. The device is benefiting the patient and the device is medically necessary.  Without proper control of sleep apnea and good compliance there is a increased risk for hypertension, diabetes mellitus and nonrestorative sleep with hypersomnia which can increase risk for motor vehicle accidents.  Untreated sleep apnea is also a risk factor for development of atrial fibrillation, pulmonary hypertension and stroke. The patient is also instructed to get the supplies from the [facility] and and change them on a regular basis.  A prescription for supplies has been sent to the [facility].  I have also discussed the good sleep hygiene habits and adequate amount of sleep needed for good health.  Given information on recall and encouraged him to register under the left side and continue with his CPAP.  He is not using any .  · Obesity, class 2 with BMI is Body mass index is 37.97 kg/m².. I have discuss the relationship between the weight and sleep apnea. The benefit of weight loss in reducing severity of sleep apnea was discussed. Discussed diet and exercise with the patient to achieve ideal BMI.  · Return in about 1 year (around 10/4/2022).  . Patient's questions were answered.        Juan Carlos Lovelace MD  Sleep Medicine.  Medical Director, [facility], [facility]  10/4/2021 ,

## 2021-10-05 ENCOUNTER — OFFICE VISIT (OUTPATIENT)
Dept: SURGERY | Facility: CLINIC | Age: 66
End: 2021-10-05

## 2021-10-05 VITALS — BODY MASS INDEX: 38.38 KG/M2 | HEIGHT: 66 IN | WEIGHT: 238.8 LBS

## 2021-10-05 DIAGNOSIS — K42.9 UMBILICAL HERNIA WITHOUT OBSTRUCTION AND WITHOUT GANGRENE: Primary | ICD-10-CM

## 2021-10-05 PROCEDURE — 99213 OFFICE O/P EST LOW 20 MIN: CPT | Performed by: SURGERY

## 2021-10-06 ENCOUNTER — PREP FOR SURGERY (OUTPATIENT)
Dept: OTHER | Facility: HOSPITAL | Age: 66
End: 2021-10-06

## 2021-10-06 DIAGNOSIS — K42.9 UMBILICAL HERNIA: Primary | ICD-10-CM

## 2021-10-06 RX ORDER — ONDANSETRON 2 MG/ML
4 INJECTION INTRAMUSCULAR; INTRAVENOUS EVERY 6 HOURS PRN
Status: CANCELLED | OUTPATIENT
Start: 2021-10-06

## 2021-10-06 RX ORDER — SODIUM CHLORIDE 0.9 % (FLUSH) 0.9 %
10 SYRINGE (ML) INJECTION EVERY 12 HOURS SCHEDULED
Status: CANCELLED | OUTPATIENT
Start: 2021-10-06

## 2021-10-06 RX ORDER — CLINDAMYCIN PHOSPHATE 900 MG/50ML
900 INJECTION INTRAVENOUS ONCE
Status: CANCELLED | OUTPATIENT
Start: 2021-10-06 | End: 2021-10-06

## 2021-10-06 RX ORDER — SODIUM CHLORIDE, SODIUM LACTATE, POTASSIUM CHLORIDE, CALCIUM CHLORIDE 600; 310; 30; 20 MG/100ML; MG/100ML; MG/100ML; MG/100ML
100 INJECTION, SOLUTION INTRAVENOUS CONTINUOUS
Status: CANCELLED | OUTPATIENT
Start: 2021-10-06

## 2021-10-06 RX ORDER — SODIUM CHLORIDE 0.9 % (FLUSH) 0.9 %
10 SYRINGE (ML) INJECTION AS NEEDED
Status: CANCELLED | OUTPATIENT
Start: 2021-10-06

## 2021-10-06 NOTE — PROGRESS NOTES
Chief Complaint: Hernia (referred by Dr. Matta. Patient has hernia )    Subjective         History of Present Illness  Dung Burch is a 66 y.o. male presents to [facility] to be seen for umbilical hernia.  Patient reports he has had a hernia for at least several months.  He reports it does not seem to be getting larger although it does frequently pop out and he has to push it back in.  Reports no associated nausea vomiting.  Pain is minimal.  He also has an associated rectus diastases that he thought was a hernia.    Objective     Past Medical History:   Diagnosis Date   • Cervical root disorders, not elsewhere classified    • Essential (primary) hypertension    • ETOH abuse    • High cholesterol    • Impaired fasting glucose    • Pure hypercholesterolemia    • Sleep apnea, unspecified    • Transient cerebral ischemic attack, unspecified    • Vitamin D deficiency, unspecified        Past Surgical History:   Procedure Laterality Date   • CHOLECYSTECTOMY  08/2019   • ENDOSCOPY AND COLONOSCOPY  2008   • KNEE SURGERY Right    • OTHER SURGICAL HISTORY      TUBAL ADENOMA   • OTHER SURGICAL HISTORY Left     CHEEK   • THYROID CYST EXCISION  2000         Current Outpatient Medications:   •  amLODIPine (NORVASC) 5 MG tablet, amlodipine 5 mg oral tablet take 1 tablet (5 mg) by oral route once daily   Active, Disp: , Rfl:   •  aspirin 81 MG chewable tablet, aspirin 81 mg oral tablet,chewable chew 1 tablet (81 mg) by oral route once daily   Active, Disp: , Rfl:   •  cholecalciferol (Vitamin D) 25 MCG (1000 UT) tablet, , Disp: , Rfl:   •  citalopram (CeleXA) 10 MG tablet, 20 mg Daily., Disp: , Rfl:   •  citalopram (CeleXA) 20 MG tablet, Take 1 tablet by mouth Daily., Disp: 90 tablet, Rfl: 3  •  citalopram (CeleXA) 20 MG tablet, 1 tablet, Disp: , Rfl:   •  lisinopril-hydrochlorothiazide (PRINZIDE,ZESTORETIC) 20-12.5 MG per tablet, lisinopril-hydrochlorothiazide 20-12.5 mg oral tablet take 1  tablet by oral route once daily   Active, Disp: , Rfl:   •  loratadine (CLARITIN) 10 MG tablet, loratadine 10 mg oral tablet take 1 tablet (10 mg) by oral route once daily   Active, Disp: , Rfl:   •  Lysine 600 MG tablet, , Disp: , Rfl:   •  metoprolol succinate XL (Toprol XL) 50 MG 24 hr tablet, Toprol XL 50 mg oral tablet extended release 24 hr take 1 tablet (50 mg) by oral route once daily   Active, Disp: , Rfl:   •  metroNIDAZOLE (METROGEL) 0.75 % gel, , Disp: , Rfl:   •  Omega-3 Fatty Acids (Fish Oil Concentrate) 300 MG capsule, , Disp: , Rfl:   •  oxybutynin XL (DITROPAN-XL) 10 MG 24 hr tablet, oxybutynin chloride 10 mg oral tablet extended release 24hr take 1 tablet (10 mg) by oral route once daily for 90 days   Active, Disp: , Rfl:   •  quinapril (ACCUPRIL) 40 MG tablet, Every 12 (Twelve) Hours., Disp: , Rfl:   •  rosuvastatin (CRESTOR) 10 MG tablet, Take 1 tablet by mouth Daily., Disp: 90 tablet, Rfl: 3  •  rosuvastatin (CRESTOR) 10 MG tablet, TAKE ONE TABLET BY MOUTH EVERY NIGHT AT BEDTIME, Disp: , Rfl:   •  spironolactone (ALDACTONE) 25 MG tablet, Take 1 tablet by mouth Daily., Disp: 90 tablet, Rfl: 3  •  spironolactone (Aldactone) 25 MG tablet, Daily., Disp: , Rfl:     No Known Allergies     Family History   Problem Relation Age of Onset   • Cancer Mother         Liver cancer   • Cancer Father         Lung caner   • Hyperlipidemia Father    • COPD Father    • Cancer Paternal Grandfather         Lung cancer       Social History     Socioeconomic History   • Marital status:      Spouse name: Not on file   • Number of children: Not on file   • Years of education: Not on file   • Highest education level: Not on file   Tobacco Use   • Smoking status: Never Smoker   • Smokeless tobacco: Never Used   Vaping Use   • Vaping Use: Never used   Substance and Sexual Activity   • Alcohol use: Yes     Alcohol/week: 3.0 standard drinks     Types: 3 Cans of beer per week     Comment: 3 per day   • Drug use: Never  "  • Sexual activity: Defer       Vital Signs:   Ht 167.6 cm (66\")   Wt 108 kg (238 lb 12.8 oz)   BMI 38.54 kg/m²      Physical Exam  Constitutional:       Appearance: Normal appearance.   Cardiovascular:      Rate and Rhythm: Normal rate.   Pulmonary:      Effort: Pulmonary effort is normal.   Abdominal:      Palpations: Abdomen is soft.   Skin:     General: Skin is warm.     1.5 cm reducible umbilical hernia with no overlying skin changes.      Result Review :            Procedures        Assessment and Plan    Diagnoses and all orders for this visit:    1. Umbilical hernia without obstruction and without gangrene (Primary)        Follow Up   No follow-ups on file.  Patient was given instructions and counseling regarding his condition or for health maintenance advice. Please see specific information pulled into the AVS if appropriate.     Patient with a symptomatic and reducible umbilical hernia.  He also has a rectus diastases.  I told him that we likely have no surgical intervention for the rectus diastases and orders were needed.  But his hernia does seem to be symptomatic and I think we could proceed with intervention.    We discussed options for intervention including open laparoscopic and robotic umbilical hernia repair.  Pros and cons of all the above were discussed extensively.  After discussion and recommendations the patient wants to proceed with a robotic umbilical hernia repair with mesh.  The patient wants to wait for his hernia repair until January of next year.  We will follow-up with him again prior to the time of operation.    Risk benefits alternatives of the procedure were discussed extensively.  All questions were answered.  Patient voiced understanding agreed to proceed    Discussed with the patient - all questions were answered they voiced understanding and agreed to proceed with above plan       "

## 2022-01-04 ENCOUNTER — TRANSCRIBE ORDERS (OUTPATIENT)
Dept: ADMINISTRATIVE | Facility: HOSPITAL | Age: 67
End: 2022-01-04

## 2022-01-04 DIAGNOSIS — M66.872: Primary | ICD-10-CM

## 2022-01-05 RX ORDER — QUINAPRIL 40 MG/1
40 TABLET ORAL EVERY 12 HOURS SCHEDULED
Qty: 90 TABLET | Refills: 3 | Status: SHIPPED | OUTPATIENT
Start: 2022-01-05 | End: 2022-10-21

## 2022-01-05 RX ORDER — SPIRONOLACTONE 25 MG/1
25 TABLET ORAL DAILY
Qty: 90 TABLET | Refills: 3 | Status: SHIPPED | OUTPATIENT
Start: 2022-01-05 | End: 2023-01-17

## 2022-01-05 RX ORDER — ROSUVASTATIN CALCIUM 10 MG/1
10 TABLET, COATED ORAL DAILY
Qty: 90 TABLET | Refills: 3 | Status: SHIPPED | OUTPATIENT
Start: 2022-01-05 | End: 2023-01-17

## 2022-01-19 ENCOUNTER — HOSPITAL ENCOUNTER (OUTPATIENT)
Dept: MRI IMAGING | Facility: HOSPITAL | Age: 67
Discharge: HOME | End: 2022-01-19
Admitting: PODIATRIST

## 2022-01-19 DIAGNOSIS — M66.872: ICD-10-CM

## 2022-01-19 PROCEDURE — 73721 MRI JNT OF LWR EXTRE W/O DYE: CPT

## 2022-01-21 ENCOUNTER — TELEMEDICINE (OUTPATIENT)
Dept: FAMILY MEDICINE CLINIC | Facility: TELEHEALTH | Age: 67
End: 2022-01-21

## 2022-01-21 ENCOUNTER — TRANSCRIBE ORDERS (OUTPATIENT)
Dept: LAB | Facility: HOSPITAL | Age: 67
End: 2022-01-21

## 2022-01-21 ENCOUNTER — LAB (OUTPATIENT)
Dept: LAB | Facility: HOSPITAL | Age: 67
End: 2022-01-21

## 2022-01-21 VITALS — TEMPERATURE: 97 F

## 2022-01-21 DIAGNOSIS — Z20.822 ENCOUNTER BY TELEHEALTH FOR SUSPECTED COVID-19: Primary | ICD-10-CM

## 2022-01-21 DIAGNOSIS — Z20.822 ENCOUNTER FOR LABORATORY TESTING FOR COVID-19 VIRUS: ICD-10-CM

## 2022-01-21 DIAGNOSIS — J01.01 ACUTE RECURRENT MAXILLARY SINUSITIS: ICD-10-CM

## 2022-01-21 DIAGNOSIS — Z20.822 ENCOUNTER FOR LABORATORY TESTING FOR COVID-19 VIRUS: Primary | ICD-10-CM

## 2022-01-21 DIAGNOSIS — R05.9 COUGH: ICD-10-CM

## 2022-01-21 DIAGNOSIS — R06.02 SHORTNESS OF BREATH: ICD-10-CM

## 2022-01-21 PROCEDURE — 99213 OFFICE O/P EST LOW 20 MIN: CPT | Performed by: NURSE PRACTITIONER

## 2022-01-21 PROCEDURE — C9803 HOPD COVID-19 SPEC COLLECT: HCPCS

## 2022-01-21 PROCEDURE — U0004 COV-19 TEST NON-CDC HGH THRU: HCPCS

## 2022-01-21 RX ORDER — DOXYCYCLINE HYCLATE 100 MG/1
100 CAPSULE ORAL 2 TIMES DAILY
Qty: 14 CAPSULE | Refills: 0 | Status: SHIPPED | OUTPATIENT
Start: 2022-01-21 | End: 2022-01-28

## 2022-01-21 RX ORDER — METHYLPREDNISOLONE 4 MG/1
TABLET ORAL
Qty: 21 TABLET | Refills: 0 | Status: SHIPPED | OUTPATIENT
Start: 2022-01-21 | End: 2022-03-30

## 2022-01-21 NOTE — PROGRESS NOTES
You have chosen to receive care through a telehealth visit.  Do you consent to use a video/audio connection for your medical care today? Yes     CHIEF COMPLAINT  Chief Complaint   Patient presents with   • Facial Pain   • Headache   • Recurrent Sinusitis   • Cough   • Shortness of Breath   • Generalized Body Aches         HPI  Dung Burch is a 66 y.o. male  presents with complaint of persistent right sinus pressure and tenderness with yellow nasal discharge. Last night he had a nose bleed and the pressure and congestion was relieved he has been on Amoxicillin for 10 days for sinusitis. He has a persistent cough (dry), body aches and mild shortness of breath for over 10 days. His oxygenation sat was 97% today and he has no fever. He is taking Advil for body aches.     Review of Systems   Constitutional: Positive for activity change and fatigue. Negative for fever.   HENT: Positive for congestion, nosebleeds, postnasal drip, sinus pressure and sinus pain.    Respiratory: Positive for cough and shortness of breath. Negative for wheezing and stridor.    Cardiovascular: Negative.    Gastrointestinal: Negative.    Musculoskeletal: Positive for myalgias.   Skin: Negative.    Neurological: Positive for headaches.   Hematological: Negative.    Psychiatric/Behavioral: Negative.        Past Medical History:   Diagnosis Date   • Cervical root disorders, not elsewhere classified    • Essential (primary) hypertension    • ETOH abuse    • High cholesterol    • Impaired fasting glucose    • Pure hypercholesterolemia    • Sleep apnea, unspecified    • Transient cerebral ischemic attack, unspecified    • Vitamin D deficiency, unspecified        Family History   Problem Relation Age of Onset   • Cancer Mother         Liver cancer   • Cancer Father         Lung caner   • Hyperlipidemia Father    • COPD Father    • Cancer Paternal Grandfather         Lung cancer       Social History     Socioeconomic History   • Marital status:     Tobacco Use   • Smoking status: Never Smoker   • Smokeless tobacco: Never Used   Vaping Use   • Vaping Use: Never used   Substance and Sexual Activity   • Alcohol use: Yes     Alcohol/week: 3.0 standard drinks     Types: 3 Cans of beer per week     Comment: 3 per day   • Drug use: Never   • Sexual activity: Defer         Temp 97 °F (36.1 °C)     PHYSICAL EXAM  Physical Exam   Constitutional: He is oriented to person, place, and time. He appears well-developed and well-nourished. He does not have a sickly appearance. He does not appear ill. No distress.   HENT:   Head: Normocephalic and atraumatic.   Right Ear: Hearing normal.   Left Ear: Hearing normal.   Nose: Right sinus exhibits no maxillary sinus tenderness and no frontal sinus tenderness. Left sinus exhibits maxillary sinus tenderness. Left sinus exhibits no frontal sinus tenderness.   Mouth/Throat: Mouth/Lips are normal.  Pulmonary/Chest: Effort normal.  No respiratory distress. He no audible wheeze...  Neurological: He is alert and oriented to person, place, and time.   Psychiatric: He has a normal mood and affect.   Vitals reviewed.      Results for orders placed or performed in visit on 09/14/21   Hemoglobin A1c    Specimen: Blood   Result Value Ref Range    Hemoglobin A1C 6.10 (H) 4.80 - 5.60 %   Lipid Panel    Specimen: Blood   Result Value Ref Range    Total Cholesterol 135 0 - 200 mg/dL    Triglycerides 175 (H) 0 - 150 mg/dL    HDL Cholesterol 46 40 - 60 mg/dL    LDL Cholesterol  60 0 - 100 mg/dL    VLDL Cholesterol 29 5 - 40 mg/dL    LDL/HDL Ratio 1.17    Comprehensive Metabolic Panel    Specimen: Blood   Result Value Ref Range    Glucose 112 (H) 65 - 99 mg/dL    BUN 14 8 - 23 mg/dL    Creatinine 1.06 0.76 - 1.27 mg/dL    Sodium 136 136 - 145 mmol/L    Potassium 4.8 3.5 - 5.2 mmol/L    Chloride 99 98 - 107 mmol/L    CO2 25.9 22.0 - 29.0 mmol/L    Calcium 9.3 8.6 - 10.5 mg/dL    Total Protein 7.2 6.0 - 8.5 g/dL    Albumin 4.70 3.50 - 5.20 g/dL     ALT (SGPT) 29 1 - 41 U/L    AST (SGOT) 23 1 - 40 U/L    Alkaline Phosphatase 59 39 - 117 U/L    Total Bilirubin 0.5 0.0 - 1.2 mg/dL    eGFR Non African Amer 70 >60 mL/min/1.73    Globulin 2.5 gm/dL    A/G Ratio 1.9 g/dL    BUN/Creatinine Ratio 13.2 7.0 - 25.0    Anion Gap 11.1 5.0 - 15.0 mmol/L   CBC Auto Differential    Specimen: Blood   Result Value Ref Range    WBC 3.31 (L) 3.40 - 10.80 10*3/mm3    RBC 4.36 4.14 - 5.80 10*6/mm3    Hemoglobin 14.4 13.0 - 17.7 g/dL    Hematocrit 40.7 37.5 - 51.0 %    MCV 93.3 79.0 - 97.0 fL    MCH 33.0 26.6 - 33.0 pg    MCHC 35.4 31.5 - 35.7 g/dL    RDW 13.5 12.3 - 15.4 %    RDW-SD 45.7 37.0 - 54.0 fl    MPV 9.4 6.0 - 12.0 fL    Platelets 171 140 - 450 10*3/mm3   Manual Differential    Specimen: Blood   Result Value Ref Range    Neutrophil % 43.0 42.7 - 76.0 %    Lymphocyte % 52.0 (H) 19.6 - 45.3 %    Monocyte % 4.0 (L) 5.0 - 12.0 %    Eosinophil % 1.0 0.3 - 6.2 %    Neutrophils Absolute 1.42 (L) 1.70 - 7.00 10*3/mm3    Lymphocytes Absolute 1.72 0.70 - 3.10 10*3/mm3    Monocytes Absolute 0.13 0.10 - 0.90 10*3/mm3    Eosinophils Absolute 0.03 0.00 - 0.40 10*3/mm3    RBC Morphology Normal Normal    WBC Morphology Normal Normal    Platelet Morphology Normal Normal       Diagnoses and all orders for this visit:    1. Encounter by telehealth for suspected COVID-19 (Primary)  -     QUESTIONNAIRE SERIES  -     COVID-19,CEPHEID/GLEN,COR/ELIO/PAD/JACINTA IN-HOUSE(OR EMERGENT/ADD-ON),NP SWAB IN TRANSPORT MEDIA 3-4 HR TAT, RT-PCR - Swab, Nasopharynx; Future    2. Acute recurrent maxillary sinusitis  -     doxycycline (VIBRAMYCIN) 100 MG capsule; Take 1 capsule by mouth 2 (Two) Times a Day for 7 days.  Dispense: 14 capsule; Refill: 0  -     methylPREDNISolone (MEDROL) 4 MG dose pack; Take as directed on package instructions.  Dispense: 21 tablet; Refill: 0    3. Cough    4. Shortness of breath  -     methylPREDNISolone (MEDROL) 4 MG dose pack; Take as directed on package instructions.  Dispense:  21 tablet; Refill: 0    monitor temperature daily  Monitor oxygenation sat daily  Increase fluids and rest  Monitor b/p daily and follow up with PCP or go to the ED prn   Quarantine until Covid test result are available.   If no improvement in cough and sinus issue follow up with PCP in 5-7 days.       FOLLOW-UP  As discussed during visit with PCP/[facility] if no improvement or Urgent Care/Emergency Department if worsening of symptoms    Patient verbalizes understanding of medication dosage, comfort measures, instructions for treatment and follow-up.    Daria Wolfe, APRN  01/21/2022  10:35 EST    This visit was performed via Telehealth.  This patient has been instructed to follow-up with their primary care provider if their symptoms worsen or the treatment provided does not resolve their illness.

## 2022-01-21 NOTE — PATIENT INSTRUCTIONS
Cough, Adult  A cough helps to clear your throat and lungs. A cough may be a sign of an illness or another medical condition.  An acute cough may only last 2-3 weeks, while a chronic cough may last 8 or more weeks.  Many things can cause a cough. They include:  · Germs (viruses or bacteria) that attack the airway.  · Breathing in things that bother (irritate) your lungs.  · Allergies.  · Asthma.  · Mucus that runs down the back of your throat (postnasal drip).  · Smoking.  · Acid backing up from the stomach into the tube that moves food from the mouth to the stomach (gastroesophageal reflux).  · Some medicines.  · Lung problems.  · Other medical conditions, such as heart failure or a blood clot in the lung (pulmonary embolism).  Follow these instructions at home:  Medicines  · Take over-the-counter and prescription medicines only as told by your doctor.  · Talk with your doctor before you take medicines that stop a cough (cough suppressants).  Lifestyle    · Do not smoke, and try not to be around smoke. Do not use any products that contain nicotine or tobacco, such as cigarettes, e-cigarettes, and chewing tobacco. If you need help quitting, ask your doctor.  · Drink enough fluid to keep your pee (urine) pale yellow.  · Avoid caffeine.  · Do not drink alcohol if your doctor tells you not to drink.    General instructions    · Watch for any changes in your cough. Tell your doctor about them.  · Always cover your mouth when you cough.  · Stay away from things that make you cough, such as perfume, candles, campfire smoke, or cleaning products.  · If the air is dry, use a cool mist vaporizer or humidifier in your home.  · If your cough is worse at night, try using extra pillows to raise your head up higher while you sleep.  · Rest as needed.  · Keep all follow-up visits as told by your doctor. This is important.    Contact a doctor if:  · You have new symptoms.  · You cough up pus.  · Your cough does not get better after  2-3 weeks, or your cough gets worse.  · Cough medicine does not help your cough and you are not sleeping well.  · You have pain that gets worse or pain that is not helped with medicine.  · You have a fever.  · You are losing weight and you do not know why.  · You have night sweats.  Get help right away if:  · You cough up blood.  · You have trouble breathing.  · Your heartbeat is very fast.  These symptoms may be an emergency. Do not wait to see if the symptoms will go away. Get medical help right away. Call your local emergency services (911 in the [place] Do not drive yourself to the hospital.  Summary  · A cough helps to clear your throat and lungs. Many things can cause a cough.  · Take over-the-counter and prescription medicines only as told by your doctor.  · Always cover your mouth when you cough.  · Contact a doctor if you have new symptoms or you have a cough that does not get better or gets worse.  This information is not intended to replace advice given to you by your health care provider. Make sure you discuss any questions you have with your health care provider.  Document Revised: 02/05/2021 Document Reviewed: 01/06/2020  [employer] Patient Education © 2021 [employer]      General Headache Without Cause  A headache is pain or discomfort that is felt around the head or neck area. There are many causes and types of headaches. In some cases, the cause may not be found.  Follow these instructions at home:  Watch your condition for any changes. Let your doctor know about them. Take these steps to help with your condition:  Managing pain         · Take over-the-counter and prescription medicines only as told by your doctor.  · Lie down in a dark, quiet room when you have a headache.  · If told, put ice on your head and neck area:  ? Put ice in a plastic bag.  ? Place a towel between your skin and the bag.  ? Leave the ice on for 20 minutes, 2-3 times per day.  · If told, put heat on the affected area. Use  the heat source that your doctor recommends, such as a moist heat pack or a heating pad.  ? Place a towel between your skin and the heat source.  ? Leave the heat on for 20-30 minutes.  ? Remove the heat if your skin turns bright red. This is very important if you are unable to feel pain, heat, or cold. You may have a greater risk of getting burned.  · Keep lights dim if bright lights bother you or make your headaches worse.  Eating and drinking  · Eat meals on a regular schedule.  · If you drink alcohol:  ? Limit how much you use to:  § 0-1 drink a day for women.  § 0-2 drinks a day for men.  ? Be aware of how much alcohol is in your drink. In the [place] one drink equals one 12 oz bottle of beer (355 mL), one 5 oz glass of wine (148 mL), or one 1½ oz glass of hard liquor (44 mL).  · Stop drinking caffeine, or reduce how much caffeine you drink.  General instructions    · Keep a journal to find out if certain things bring on headaches. For example, write down:  ? What you eat and drink.  ? How much sleep you get.  ? Any change to your diet or medicines.  · Get a massage or try other ways to relax.  · Limit stress.  · Sit up straight. Do not tighten (tense) your muscles.  · Do not use any products that contain nicotine or tobacco. This includes cigarettes, e-cigarettes, and chewing tobacco. If you need help quitting, ask your doctor.  · Exercise regularly as told by your doctor.  · Get enough sleep. This often means 7-9 hours of sleep each night.  · Keep all follow-up visits as told by your doctor. This is important.    Contact a doctor if:  · Your symptoms are not helped by medicine.  · You have a headache that feels different than the other headaches.  · You feel sick to your stomach (nauseous) or you throw up (vomit).  · You have a fever.  Get help right away if:  · Your headache gets very bad quickly.  · Your headache gets worse after a lot of physical activity.  · You keep throwing up.  · You have a stiff  neck.  · You have trouble seeing.  · You have trouble speaking.  · You have pain in the eye or ear.  · Your muscles are weak or you lose muscle control.  · You lose your balance or have trouble walking.  · You feel like you will pass out (faint) or you pass out.  · You are mixed up (confused).  · You have a seizure.  Summary  · A headache is pain or discomfort that is felt around the head or neck area.  · There are many causes and types of headaches. In some cases, the cause may not be found.  · Keep a journal to help find out what causes your headaches. Watch your condition for any changes. Let your doctor know about them.  · Contact a doctor if you have a headache that is different from usual, or if your headache is not helped by medicine.  · Get help right away if your headache gets very bad, you throw up, you have trouble seeing, you lose your balance, or you have a seizure.  This information is not intended to replace advice given to you by your health care provider. Make sure you discuss any questions you have with your health care provider.  Document Revised: 07/08/2019 Document Reviewed: 07/08/2019  [employer] Patient Education © 2021 [employer]    10 Things You Can Do to Manage Your COVID-19 Symptoms at Home  If you have possible or confirmed COVID-19:  1. Stay home except to get medical care.  2. Monitor your symptoms carefully. If your symptoms get worse, call your healthcare provider immediately.  3. Get rest and stay hydrated.  4. If you have a medical appointment, call the healthcare provider ahead of time and tell them that you have or may have COVID-19.  5. For medical emergencies, call 911 and notify the dispatch personnel that you have or may have COVID-19.  6. Cover your cough and sneezes with a tissue or use the inside of your elbow.  7. Wash your hands often with soap and water for at least 20 seconds or clean your hands with an alcohol-based hand  that contains at least 60%  alcohol.  8. As much as possible, stay in a specific room and away from other people in your home. Also, you should use a separate bathroom, if available. If you need to be around other people in or outside of the home, wear a mask.  9. Avoid sharing personal items with other people in your household, like dishes, towels, and bedding.  10. Clean all surfaces that are touched often, like counters, tabletops, and doorknobs. Use household cleaning sprays or wipes according to the label instructions.  cdc.gov/coronavirus  07/16/2021  This information is not intended to replace advice given to you by your health care provider. Make sure you discuss any questions you have with your health care provider.  Document Revised: 08/04/2021 Document Reviewed: 08/04/2021  [employer]vier Patient Education © 2021 [employer]      Sinusitis, Adult  Sinusitis is soreness and swelling (inflammation) of your sinuses. Sinuses are hollow spaces in the bones around your face. They are located:  · Around your eyes.  · In the middle of your forehead.  · Behind your nose.  · In your cheekbones.  Your sinuses and nasal passages are lined with a fluid called mucus. Mucus drains out of your sinuses. Swelling can trap mucus in your sinuses. This lets germs (bacteria, virus, or fungus) grow, which leads to infection. Most of the time, this condition is caused by a virus.  What are the causes?  This condition is caused by:  · Allergies.  · Asthma.  · Germs.  · Things that block your nose or sinuses.  · Growths in the nose (nasal polyps).  · Chemicals or irritants in the air.  · Fungus (rare).  What increases the risk?  You are more likely to develop this condition if:  · You have a weak body defense system (immune system).  · You do a lot of swimming or diving.  · You use nasal sprays too much.  · You smoke.  What are the signs or symptoms?  The main symptoms of this condition are pain and a feeling of pressure around the sinuses. Other symptoms  include:  · Stuffy nose (congestion).  · Runny nose (drainage).  · Swelling and warmth in the sinuses.  · Headache.  · Toothache.  · A cough that may get worse at night.  · Mucus that collects in the throat or the back of the nose (postnasal drip).  · Being unable to smell and taste.  · Being very tired (fatigue).  · A fever.  · Sore throat.  · Bad breath.  How is this diagnosed?  This condition is diagnosed based on:  · Your symptoms.  · Your medical history.  · A physical exam.  · Tests to find out if your condition is short-term (acute) or long-term (chronic). Your doctor may:  ? Check your nose for growths (polyps).  ? Check your sinuses using a tool that has a light (endoscope).  ? Check for allergies or germs.  ? Do imaging tests, such as an MRI or CT scan.  How is this treated?  Treatment for this condition depends on the cause and whether it is short-term or long-term.  · If caused by a virus, your symptoms should go away on their own within 10 days. You may be given medicines to relieve symptoms. They include:  ? Medicines that shrink swollen tissue in the nose.  ? Medicines that treat allergies (antihistamines).  ? A spray that treats swelling of the nostrils.   ? Rinses that help get rid of thick mucus in your nose (nasal saline washes).  · If caused by bacteria, your doctor may wait to see if you will get better without treatment. You may be given antibiotic medicine if you have:  ? A very bad infection.  ? A weak body defense system.  · If caused by growths in the nose, you may need to have surgery.  Follow these instructions at home:  Medicines  · Take, use, or apply over-the-counter and prescription medicines only as told by your doctor. These may include nasal sprays.  · If you were prescribed an antibiotic medicine, take it as told by your doctor. Do not stop taking the antibiotic even if you start to feel better.  Hydrate and humidify    · Drink enough water to keep your pee (urine) pale  yellow.  · Use a cool mist humidifier to keep the humidity level in your home above 50%.  · Breathe in steam for 10-15 minutes, 3-4 times a day, or as told by your doctor. You can do this in the bathroom while a hot shower is running.  · Try not to spend time in cool or dry air.    Rest  · Rest as much as you can.  · Sleep with your head raised (elevated).  · Make sure you get enough sleep each night.  General instructions    · Put a warm, moist washcloth on your face 3-4 times a day, or as often as told by your doctor. This will help with discomfort.  · Wash your hands often with soap and water. If there is no soap and water, use hand .  · Do not smoke. Avoid being around people who are smoking (secondhand smoke).  · Keep all follow-up visits as told by your doctor. This is important.    Contact a doctor if:  · You have a fever.  · Your symptoms get worse.  · Your symptoms do not get better within 10 days.  Get help right away if:  · You have a very bad headache.  · You cannot stop throwing up (vomiting).  · You have very bad pain or swelling around your face or eyes.  · You have trouble seeing.  · You feel confused.  · Your neck is stiff.  · You have trouble breathing.  Summary  · Sinusitis is swelling of your sinuses. Sinuses are hollow spaces in the bones around your face.  · This condition is caused by tissues in your nose that become inflamed or swollen. This traps germs. These can lead to infection.  · If you were prescribed an antibiotic medicine, take it as told by your doctor. Do not stop taking it even if you start to feel better.  · Keep all follow-up visits as told by your doctor. This is important.  This information is not intended to replace advice given to you by your health care provider. Make sure you discuss any questions you have with your health care provider.  Document Revised: 05/20/2019 Document Reviewed: 05/20/2019  Elsevier Patient Education © 2021 [employer]  How to Quarantine  at Home  Information for Patients and Families    These instructions are for people with confirmed or suspected COVID-19 who do not need to be hospitalized and those with confirmed COVID-19 who were hospitalized and discharged to care for themselves at home.    If you were tested through the Health Department  The Health Department will monitor your wellbeing.  If it is determined that you do not need to be hospitalized and can be isolated at home, you will be monitored by staff from your local or state health department.     If you were tested through a [facility]  You will need to monitor yourself and report changes in your symptoms to your doctor.  See the section below called Monitor Your Symptoms.    Follow these steps until a healthcare provider or local or state health department says you can return to your normal activities.    Stay home except to get medical care  • Restrict activities outside your home, except for getting medical care.   • Do not go to work, school, or public areas.   • Avoid using public transportation, ride-sharing, or taxis.    Separate yourself from other people and animals in your home  People  As much as possible, you should stay in a specific room and away from other people in your home. Also, you should use a separate bathroom, if available.    Animals  You should restrict contact with pets and other animals while you are sick with COVID-19, just like you would around other people. When possible, have another member of your household care for your animals while you are sick. If you are sick with COVID-19, avoid contact with your pet, including petting, snuggling, being kissed or licked, and sharing food. If you must care for your pet or be around animals while you are sick, wash your hands before and after you interact with pets and wear a facemask. See COVID-19 and Animals for more information.    Call ahead before visiting your doctor  If you have a medical appointment, call  the healthcare provider and tell them that you have or may have COVID-19. This information will help the healthcare provider’s office take steps to keep other people from getting infected or exposed.    Wear a facemask  You should wear a facemask when you are around other people (e.g., sharing a room or vehicle) or pets and before you enter a healthcare provider’s office.     If you are not able to wear a facemask (for example, because it causes trouble breathing), then people who live with you should not stay in the same room with you, or they should wear a facemask if they enter your room.    Cover your coughs and sneezes  • Cover your mouth and nose with a tissue when you cough or sneeze.   • Throw used tissues in a lined trash can.   • Immediately wash your hands with soap and water for at least 20 seconds or, if soap and water are not available, clean your hands with an alcohol-based hand  that contains at least 60% alcohol.    Clean your hands often  • Wash your hands often with soap and water for at least 20 seconds, especially after blowing your nose, coughing, or sneezing; going to the bathroom; and before eating or preparing food.     • If soap and water are not readily available, use an alcohol-based hand  with at least 60% alcohol, covering all surfaces of your hands and rubbing them together until they feel dry.    • Soap and water are the best option if hands are visibly dirty. Avoid touching your eyes, nose, and mouth with unwashed hands.    Avoid sharing personal household items  • You should not share dishes, drinking glasses, cups, eating utensils, towels, or bedding with other people or pets in your home.   • After using these items, they should be washed thoroughly with soap and water.    Clean all “high-touch” surfaces everyday  • High touch surfaces include counters, tabletops, doorknobs, bathroom fixtures, toilets, phones, keyboards, tablets, and bedside tables.   • Also,  clean any surfaces that may have blood, stool, or body fluids on them.   • Use a household cleaning spray or wipe, according to the label instructions. Labels contain instructions for safe and effective use of the cleaning product, including precautions you should take when applying the product, such as wearing gloves and making sure you have good ventilation during use of the product.    Monitor your symptoms  • Seek prompt medical attention if your illness is worsening (e.g., difficulty breathing).   • Before seeking care, call your healthcare provider and tell them that you have, or are being evaluated for, COVID-19.   • Put on a facemask before you enter the facility.     • These steps will help the healthcare provider’s office to keep other people in the office or waiting room from getting infected or exposed.   • Persons who are placed under active monitoring or facilitated self-monitoring should follow instructions provided by their local health department or occupational health professionals, as appropriate.  • If you have a medical emergency and need to call 911, notify the dispatch personnel that you have, or are being evaluated for COVID-19. If possible, put on a facemask before emergency medical services arrive.    Discontinuing home isolation  Patients with confirmed COVID-19 should remain under home isolation precautions until the risk of secondary transmission to others is thought to be low. The decision to discontinue home isolation precautions should be made on a case-by-case basis, in consultation with healthcare providers and state and local health departments.    The below content are for household members, intimate partners, and caregivers of a patient with symptomatic laboratory-confirmed COVID-19 or a patient under investigation:    Household members, intimate partners, and caregivers may have close contact with a person with symptomatic, laboratory-confirmed COVID-19 or a person under  investigation.     Close contacts should monitor their health; they should call their healthcare provider right away if they develop symptoms suggestive of COVID-19 (e.g., fever, cough, shortness of breath)     Close contacts should also follow these recommendations:  • Make sure that you understand and can help the patient follow their healthcare provider’s instructions for medication(s) and care. You should help the patient with basic needs in the home and provide support for getting groceries, prescriptions, and other personal needs.  • Monitor the patient’s symptoms. If the patient is getting sicker, call his or her healthcare provider and tell them that the patient has laboratory-confirmed COVID-19. This will help the healthcare provider’s office take steps to keep other people in the office or waiting room from getting infected. Ask the healthcare provider to call the local or [facility] health department for additional guidance. If the patient has a medical emergency and you need to call 911, notify the dispatch personnel that the patient has, or is being evaluated for COVID-19.  • Household members should stay in another room or be  from the patient as much as possible. Household members should use a separate bedroom and bathroom, if available.  • Prohibit visitors who do not have an essential need to be in the home.  • Household members should care for any pets in the home. Do not handle pets or other animals while sick.  For more information, see COVID-19 and Animals.  • Make sure that shared spaces in the home have good air flow, such as by an air conditioner or an opened window, weather permitting.  • Perform hand hygiene frequently. Wash your hands often with soap and water for at least 20 seconds or use an alcohol-based hand  that contains 60 to 95% alcohol, covering all surfaces of your hands and rubbing them together until they feel dry. Soap and water should be used preferentially if  hands are visibly dirty.  • Avoid touching your eyes, nose, and mouth with unwashed hands.  • The patient should wear a facemask when you are around other people. If the patient is not able to wear a facemask (for example, because it causes trouble breathing), you, as the caregiver, should wear a mask when you are in the same room as the patient.  • Wear a disposable facemask and gloves when you touch or have contact with the patient’s blood, stool, or body fluids, such as saliva, sputum, nasal mucus, vomit, or urine.   o Throw out disposable facemasks and gloves after using them. Do not reuse.  o When removing personal protective equipment, first remove and dispose of gloves. Then, immediately clean your hands with soap and water or alcohol-based hand . Next, remove and dispose of facemask, and immediately clean your hands again with soap and water or alcohol-based hand .  • Avoid sharing household items with the patient. You should not share dishes, drinking glasses, cups, eating utensils, towels, bedding, or other items. After the patient uses these items, you should wash them thoroughly (see below “Wash laundry thoroughly”).  • Clean all “high-touch” surfaces, such as counters, tabletops, doorknobs, bathroom fixtures, toilets, phones, keyboards, tablets, and bedside tables, every day. Also, clean any surfaces that may have blood, stool, or body fluids on them.   o Use a household cleaning spray or wipe, according to the label instructions. Labels contain instructions for safe and effective use of the cleaning product including precautions you should take when applying the product, such as wearing gloves and making sure you have good ventilation during use of the product.  • Wash laundry thoroughly.   o Immediately remove and wash clothes or bedding that have blood, stool, or body fluids on them.  o Wear disposable gloves while handling soiled items and keep soiled items away from your body.  Clean your hands (with soap and water or an alcohol-based hand ) immediately after removing your gloves.  o Read and follow directions on labels of laundry or clothing items and detergent. In general, using a normal laundry detergent according to washing machine instructions and dry thoroughly using the warmest temperatures recommended on the clothing label.  • Place all used disposable gloves, facemasks, and other contaminated items in a lined container before disposing of them with other household waste. Clean your hands (with soap and water or an alcohol-based hand ) immediately after handling these items. Soap and water should be used preferentially if hands are visibly dirty.  • Discuss any additional questions with your state or local health department or healthcare provider.    Adapted from information provided by the Centers for Disease Control and Prevention.  For more information, visit https://www.cdc.gov/coronavirus/2019-ncov/hcp/guidance-prevent-spread.html

## 2022-01-24 LAB — SARS-COV-2 RNA PNL SPEC NAA+PROBE: DETECTED

## 2022-03-23 ENCOUNTER — TELEPHONE (OUTPATIENT)
Dept: SURGERY | Facility: CLINIC | Age: 67
End: 2022-03-23

## 2022-03-23 NOTE — TELEPHONE ENCOUNTER
CALLED DR BURGESS'S OFFICE RADHA PIERCE/ SANCHEZ INFORMED HER THAT THE PT CANCELLED THE APPT AND WANTED TO LOSE WAIT BEFORE SURGERY.

## 2022-03-29 ENCOUNTER — LAB (OUTPATIENT)
Dept: LAB | Facility: HOSPITAL | Age: 67
End: 2022-03-29

## 2022-03-29 DIAGNOSIS — E78.2 MIXED HYPERLIPIDEMIA: ICD-10-CM

## 2022-03-29 DIAGNOSIS — R73.01 IFG (IMPAIRED FASTING GLUCOSE): ICD-10-CM

## 2022-03-29 DIAGNOSIS — I63.312 CEREBROVASCULAR ACCIDENT (CVA) DUE TO THROMBOSIS OF LEFT MIDDLE CEREBRAL ARTERY: ICD-10-CM

## 2022-03-29 DIAGNOSIS — Z12.5 SCREENING PSA (PROSTATE SPECIFIC ANTIGEN): ICD-10-CM

## 2022-03-29 DIAGNOSIS — E66.09 CLASS 2 OBESITY DUE TO EXCESS CALORIES WITHOUT SERIOUS COMORBIDITY WITH BODY MASS INDEX (BMI) OF 39.0 TO 39.9 IN ADULT: ICD-10-CM

## 2022-03-29 DIAGNOSIS — I10 HYPERTENSION, ESSENTIAL: ICD-10-CM

## 2022-03-29 LAB
ALBUMIN SERPL-MCNC: 4.9 G/DL (ref 3.5–5.2)
ALBUMIN/GLOB SERPL: 2.2 G/DL
ALP SERPL-CCNC: 50 U/L (ref 39–117)
ALT SERPL W P-5'-P-CCNC: 30 U/L (ref 1–41)
ANION GAP SERPL CALCULATED.3IONS-SCNC: 8.9 MMOL/L (ref 5–15)
AST SERPL-CCNC: 22 U/L (ref 1–40)
BASOPHILS # BLD AUTO: 0.01 10*3/MM3 (ref 0–0.2)
BASOPHILS NFR BLD AUTO: 0.3 % (ref 0–1.5)
BILIRUB SERPL-MCNC: 0.5 MG/DL (ref 0–1.2)
BUN SERPL-MCNC: 16 MG/DL (ref 8–23)
BUN/CREAT SERPL: 16.2 (ref 7–25)
CALCIUM SPEC-SCNC: 9.7 MG/DL (ref 8.6–10.5)
CHLORIDE SERPL-SCNC: 104 MMOL/L (ref 98–107)
CO2 SERPL-SCNC: 27.1 MMOL/L (ref 22–29)
CREAT SERPL-MCNC: 0.99 MG/DL (ref 0.76–1.27)
DEPRECATED RDW RBC AUTO: 45.9 FL (ref 37–54)
EGFRCR SERPLBLD CKD-EPI 2021: 84 ML/MIN/1.73
EOSINOPHIL # BLD AUTO: 0.02 10*3/MM3 (ref 0–0.4)
EOSINOPHIL NFR BLD AUTO: 0.6 % (ref 0.3–6.2)
ERYTHROCYTE [DISTWIDTH] IN BLOOD BY AUTOMATED COUNT: 13.5 % (ref 12.3–15.4)
GLOBULIN UR ELPH-MCNC: 2.2 GM/DL
GLUCOSE SERPL-MCNC: 119 MG/DL (ref 65–99)
HBA1C MFR BLD: 6.1 % (ref 4.8–5.6)
HCT VFR BLD AUTO: 40.3 % (ref 37.5–51)
HGB BLD-MCNC: 14.4 G/DL (ref 13–17.7)
IMM GRANULOCYTES # BLD AUTO: 0 10*3/MM3 (ref 0–0.05)
IMM GRANULOCYTES NFR BLD AUTO: 0 % (ref 0–0.5)
LYMPHOCYTES # BLD AUTO: 1.7 10*3/MM3 (ref 0.7–3.1)
LYMPHOCYTES NFR BLD AUTO: 53.6 % (ref 19.6–45.3)
MCH RBC QN AUTO: 33.7 PG (ref 26.6–33)
MCHC RBC AUTO-ENTMCNC: 35.7 G/DL (ref 31.5–35.7)
MCV RBC AUTO: 94.4 FL (ref 79–97)
MONOCYTES # BLD AUTO: 0.16 10*3/MM3 (ref 0.1–0.9)
MONOCYTES NFR BLD AUTO: 5 % (ref 5–12)
NEUTROPHILS NFR BLD AUTO: 1.28 10*3/MM3 (ref 1.7–7)
NEUTROPHILS NFR BLD AUTO: 40.5 % (ref 42.7–76)
NRBC BLD AUTO-RTO: 0 /100 WBC (ref 0–0.2)
PLATELET # BLD AUTO: 149 10*3/MM3 (ref 140–450)
PMV BLD AUTO: 10.1 FL (ref 6–12)
POTASSIUM SERPL-SCNC: 4.6 MMOL/L (ref 3.5–5.2)
PROT SERPL-MCNC: 7.1 G/DL (ref 6–8.5)
PSA SERPL-MCNC: 1.58 NG/ML (ref 0–4)
RBC # BLD AUTO: 4.27 10*6/MM3 (ref 4.14–5.8)
SODIUM SERPL-SCNC: 140 MMOL/L (ref 136–145)
T4 FREE SERPL-MCNC: 1.29 NG/DL (ref 0.93–1.7)
TSH SERPL DL<=0.05 MIU/L-ACNC: 1.16 UIU/ML (ref 0.27–4.2)
WBC NRBC COR # BLD: 3.17 10*3/MM3 (ref 3.4–10.8)

## 2022-03-29 PROCEDURE — 83036 HEMOGLOBIN GLYCOSYLATED A1C: CPT

## 2022-03-29 PROCEDURE — 84443 ASSAY THYROID STIM HORMONE: CPT

## 2022-03-29 PROCEDURE — 36415 COLL VENOUS BLD VENIPUNCTURE: CPT

## 2022-03-29 PROCEDURE — 80053 COMPREHEN METABOLIC PANEL: CPT

## 2022-03-29 PROCEDURE — 85025 COMPLETE CBC W/AUTO DIFF WBC: CPT

## 2022-03-29 PROCEDURE — G0103 PSA SCREENING: HCPCS

## 2022-03-29 PROCEDURE — 84439 ASSAY OF FREE THYROXINE: CPT

## 2022-03-30 ENCOUNTER — OFFICE VISIT (OUTPATIENT)
Dept: INTERNAL MEDICINE | Facility: CLINIC | Age: 67
End: 2022-03-30

## 2022-03-30 VITALS
BODY MASS INDEX: 39.21 KG/M2 | HEART RATE: 102 BPM | SYSTOLIC BLOOD PRESSURE: 145 MMHG | DIASTOLIC BLOOD PRESSURE: 91 MMHG | OXYGEN SATURATION: 93 % | HEIGHT: 66 IN | TEMPERATURE: 98.6 F | WEIGHT: 244 LBS

## 2022-03-30 DIAGNOSIS — Z99.89 OSA ON CPAP: ICD-10-CM

## 2022-03-30 DIAGNOSIS — G47.33 OSA ON CPAP: ICD-10-CM

## 2022-03-30 DIAGNOSIS — K42.9 UMBILICAL HERNIA WITHOUT OBSTRUCTION AND WITHOUT GANGRENE: ICD-10-CM

## 2022-03-30 DIAGNOSIS — I10 HYPERTENSION, ESSENTIAL: Primary | ICD-10-CM

## 2022-03-30 DIAGNOSIS — D72.819 LEUKOPENIA, UNSPECIFIED TYPE: ICD-10-CM

## 2022-03-30 DIAGNOSIS — I63.312 CEREBROVASCULAR ACCIDENT (CVA) DUE TO THROMBOSIS OF LEFT MIDDLE CEREBRAL ARTERY: ICD-10-CM

## 2022-03-30 DIAGNOSIS — E66.09 CLASS 2 OBESITY DUE TO EXCESS CALORIES WITHOUT SERIOUS COMORBIDITY WITH BODY MASS INDEX (BMI) OF 39.0 TO 39.9 IN ADULT: ICD-10-CM

## 2022-03-30 DIAGNOSIS — Z12.5 SCREENING PSA (PROSTATE SPECIFIC ANTIGEN): ICD-10-CM

## 2022-03-30 DIAGNOSIS — E78.2 MIXED HYPERLIPIDEMIA: ICD-10-CM

## 2022-03-30 PROCEDURE — 99214 OFFICE O/P EST MOD 30 MIN: CPT | Performed by: INTERNAL MEDICINE

## 2022-03-30 NOTE — PROGRESS NOTES
"Chief Complaint/ HPI: f/u with bp and epistaxis  No cp , no soa  Fatigue a lot       Objective   Vital Signs  Vitals:    03/30/22 1524   BP: 145/91   Pulse: 102   Temp: 98.6 °F (37 °C)   SpO2: 93%   Weight: 111 kg (244 lb)   Height: 167.6 cm (65.98\")      Body mass index is 39.4 kg/m².  Review of Systems   HENT: Positive for nosebleeds.    Eyes: Negative.    Respiratory: Negative.    Cardiovascular: Negative.    Gastrointestinal: Negative.    Endocrine: Negative.    Genitourinary: Negative.    Musculoskeletal: Negative.    Allergic/Immunologic: Negative.    Neurological: Positive for weakness.   Hematological: Negative.    Psychiatric/Behavioral: Negative.       Physical Exam  Constitutional:       General: He is not in acute distress.     Appearance: Normal appearance. He is obese.   HENT:      Head: Normocephalic.      Mouth/Throat:      Mouth: Mucous membranes are moist.   Eyes:      Conjunctiva/sclera: Conjunctivae normal.      Pupils: Pupils are equal, round, and reactive to light.   Cardiovascular:      Rate and Rhythm: Normal rate and regular rhythm.      Pulses: Normal pulses.      Heart sounds: Normal heart sounds.   Pulmonary:      Effort: Pulmonary effort is normal.      Breath sounds: Normal breath sounds.   Abdominal:      General: Bowel sounds are normal.      Palpations: Abdomen is soft.   Musculoskeletal:         General: No swelling. Normal range of motion.      Cervical back: Neck supple.   Skin:     General: Skin is warm and dry.      Coloration: Skin is not jaundiced.   Neurological:      General: No focal deficit present.      Mental Status: He is alert and oriented to person, place, and time. Mental status is at baseline.   Psychiatric:         Mood and Affect: Mood normal.         Behavior: Behavior normal.         Thought Content: Thought content normal.         Judgment: Judgment normal.        Result Review :   No results found for: PROBNP, BNP  CMP    CMP 9/14/21 3/29/22   Glucose 112 (A) " 119 (A)   BUN 14 16   Creatinine 1.06 0.99   eGFR Non African Am 70    Sodium 136 140   Potassium 4.8 4.6   Chloride 99 104   Calcium 9.3 9.7   Albumin 4.70 4.90   Total Bilirubin 0.5 0.5   Alkaline Phosphatase 59 50   AST (SGOT) 23 22   ALT (SGPT) 29 30   (A) Abnormal value            CBC w/diff    CBC w/Diff 9/14/21 3/29/22   WBC 3.31 (A) 3.17 (A)   RBC 4.36 4.27   Hemoglobin 14.4 14.4   Hematocrit 40.7 40.3   MCV 93.3 94.4   MCH 33.0 33.7 (A)   MCHC 35.4 35.7   RDW 13.5 13.5   Platelets 171 149   Neutrophil Rel %  40.5 (A)   Immature Granulocyte Rel %  0.0   Lymphocyte Rel %  53.6 (A)   Monocyte Rel %  5.0   Eosinophil Rel %  0.6   Basophil Rel %  0.3   (A) Abnormal value             Lipid Panel    Lipid Panel 9/14/21   Total Cholesterol 135   Triglycerides 175 (A)   HDL Cholesterol 46   VLDL Cholesterol 29   LDL Cholesterol  60   LDL/HDL Ratio 1.17   (A) Abnormal value             Lab Results   Component Value Date    TSH 1.160 03/29/2022    TSH 1.810 12/29/2020    TSH 1.370 07/06/2020      Lab Results   Component Value Date    FREET4 1.29 03/29/2022    FREET4 1.2 12/29/2020    FREET4 1.1 07/06/2020      A1C Last 3 Results    HGBA1C Last 3 Results 9/14/21 3/29/22   Hemoglobin A1C 6.10 (A) 6.10 (A)   (A) Abnormal value             PSA    PSA 3/29/22   PSA 1.580                          Visit Diagnoses:    ICD-10-CM ICD-9-CM   1. Hypertension, essential  I10 401.9   2. Screening PSA (prostate specific antigen)  Z12.5 V76.44   3. Class 2 obesity due to excess calories without serious comorbidity with body mass index (BMI) of 39.0 to 39.9 in adult  E66.09 278.00    Z68.39 V85.39   4. Mixed hyperlipidemia  E78.2 272.2   5. Cerebrovascular accident (CVA) due to thrombosis of left middle cerebral artery (HCC)  I63.312 434.01   6. SHANI on CPAP  G47.33 327.23    Z99.89 V46.8   7. Umbilical hernia without obstruction and without gangrene  K42.9 553.1   8. Leukopenia, unspecified type  D72.819 288.50       Assessment and  Plan   Diagnoses and all orders for this visit:    1. Hypertension, essential (Primary)  -     Ambulatory Referral to Hematology / Oncology  -     CBC & Differential; Future  -     Comprehensive Metabolic Panel; Future  -     Lipid Panel; Future    2. Screening PSA (prostate specific antigen)  -     Ambulatory Referral to Hematology / Oncology  -     CBC & Differential; Future  -     Comprehensive Metabolic Panel; Future  -     Lipid Panel; Future    3. Class 2 obesity due to excess calories without serious comorbidity with body mass index (BMI) of 39.0 to 39.9 in adult  -     Ambulatory Referral to Hematology / Oncology  -     CBC & Differential; Future  -     Comprehensive Metabolic Panel; Future  -     Lipid Panel; Future    4. Mixed hyperlipidemia  -     Ambulatory Referral to Hematology / Oncology  -     CBC & Differential; Future  -     Comprehensive Metabolic Panel; Future  -     Lipid Panel; Future    5. Cerebrovascular accident (CVA) due to thrombosis of left middle cerebral artery (HCC)  -     Ambulatory Referral to Hematology / Oncology  -     CBC & Differential; Future  -     Comprehensive Metabolic Panel; Future  -     Lipid Panel; Future    6. SHANI on CPAP  -     Ambulatory Referral to Hematology / Oncology  -     CBC & Differential; Future  -     Comprehensive Metabolic Panel; Future  -     Lipid Panel; Future    7. Umbilical hernia without obstruction and without gangrene  -     Ambulatory Referral to Hematology / Oncology  -     CBC & Differential; Future  -     Comprehensive Metabolic Panel; Future  -     Lipid Panel; Future    8. Leukopenia, unspecified type  -     Ambulatory Referral to Hematology / Oncology  -     CBC & Differential; Future  -     Comprehensive Metabolic Panel; Future  -     Lipid Panel; Future    Leukopenia, etiology unclear, would feel better hematology oncology saw patient for second opinion given the continued slow drop over the past 2 to 3 years since 2019, discussed with  patient March 30, 2022 will send to Dr. Nayak for second opinion    Elevated PSA and new diagnosis at 4.8 January 2021,, status post treatment with Cipro, improved stable currently at 1.5 March 2022,-- PSA DOWN TO 1.49, FEB 4, 2021 , PT STOPPED CELEXA WHILE TAKING CIPRO DUE TO DRUG- DRUG INTERACTION, ---    umbilical hernia --- did see Adonay Sainz September 2021--wants to lose wgt first march 2022    Mild reactive depression, --cont Celexa 20 mg , Wellbutrin  MG QD      Polycythemia differential diagnosis includes polycythemia vera versus testosterone replacement the patient is recently started November 2019, patient also on thyroid replacement therapy, discussed risks of all these medications and replacement medications with him at length including heart attack stroke ---Currently on testosterone, blood counts are stable-- off testosterone January 2021, hemoglobin down to 14.1 stable    HTN -  Cont Quinapril 40 mg twice a day,, doxazosin 4 mg daily at bedtime, spironolactone 25 mg daily---workup to include plasma metanephrine, renin, aldosterone and chemistry panels, August 2018 on normal--    LACUNAR INFARCT L BASAL GANGLIA, OLD , ON CT BRAIN JUNE 2018, -- echocardiogram,Normal ejection fraction otherwise unremarkable July 2018 carotid ultrasound, No significant stenosis July 2018,, Continue high-dose moderate dose statin therapy along with aspirin 81 mg daily, recommendations are to stop NSAIDs and go back to arthritis strength Tylenol to help with blood pressure control and lower risk of stroke etc.,    HA--//Neck pain with radiculopathy---, June 2018   results reviewed, no significant spinal pathology, no significant stroke on MRI of the brain, right maxillary sinus polyp, July 2018,    S/P JA CHOLEY MAY 2019, HARLEY ----discussed postcholecystectomy diarrhea and--treatment options, Colestid March 2022    Jc, on cpap, 2019 , --    r shoulder mass, r/o lipoma--DID SEE ORTHO     elevated chol  --cont -ROUSAVASTATIN 10 MG QD ,      IFG, 105- 110 --Hemoglobin A1c of 5.7, December 2020, up to 6.1 March 2022,    C SCOPE 2013, HARLEY ,Repeat colonoscopy September 2019, , Colonoscopy noted, one small tubular adenoma            Follow Up   Return in about 6 months (around 9/30/2022).  Patient was given instructions and counseling regarding his condition or for health maintenance advice. Please see specific information pulled into the AVS if appropriate.

## 2022-04-14 ENCOUNTER — CONSULT (OUTPATIENT)
Dept: ONCOLOGY | Facility: HOSPITAL | Age: 67
End: 2022-04-14

## 2022-04-14 VITALS
SYSTOLIC BLOOD PRESSURE: 157 MMHG | RESPIRATION RATE: 18 BRPM | DIASTOLIC BLOOD PRESSURE: 81 MMHG | BODY MASS INDEX: 39.34 KG/M2 | HEART RATE: 82 BPM | WEIGHT: 243.61 LBS | TEMPERATURE: 98.7 F | OXYGEN SATURATION: 98 %

## 2022-04-14 DIAGNOSIS — D70.9 NEUTROPENIA, UNSPECIFIED TYPE: Primary | ICD-10-CM

## 2022-04-14 PROBLEM — M53.1: Status: ACTIVE | Noted: 2022-04-14

## 2022-04-14 PROBLEM — T14.8XXA CONTUSION: Status: ACTIVE | Noted: 2018-02-01

## 2022-04-14 PROBLEM — N39.41 URGENCY INCONTINENCE: Status: ACTIVE | Noted: 2022-04-14

## 2022-04-14 PROBLEM — G45.9 TRANSIENT ISCHEMIC ATTACK: Status: ACTIVE | Noted: 2022-04-14

## 2022-04-14 PROBLEM — R39.15 URGENCY OF URINATION: Status: ACTIVE | Noted: 2022-04-14

## 2022-04-14 PROBLEM — E55.9 VITAMIN D DEFICIENCY: Status: ACTIVE | Noted: 2022-04-14

## 2022-04-14 PROBLEM — M19.90 ARTHRITIS: Status: ACTIVE | Noted: 2022-04-14

## 2022-04-14 PROBLEM — N20.0 KIDNEY STONES: Status: ACTIVE | Noted: 2022-04-14

## 2022-04-14 PROCEDURE — G0463 HOSPITAL OUTPT CLINIC VISIT: HCPCS | Performed by: INTERNAL MEDICINE

## 2022-04-14 PROCEDURE — 99214 OFFICE O/P EST MOD 30 MIN: CPT | Performed by: INTERNAL MEDICINE

## 2022-04-14 NOTE — PROGRESS NOTES
Patient  Dung Burch    [facility] HEMATOLOGY & ONCOLOGY    Chief Complaint  leukopenia    Referring Provider: Isaac Matta, *  PCP: Isaac Matta MD    Subjective          Oncology/Hematology History    No history exists.       HPI  Patient comes in today for initial evaluation regarding neutropenia.  He is a long-term patient of Dr. Derrick Matta.  I reviewed his lab over the past 2 years which show his absolute neutrophil count was within normal limits until 7 months ago when it dropped to 1420.  Repeat labs on 3/29/2022 show his ANC dropped further to 1280.  His total WBC count was over 3000 with a normal hemoglobin of 4.4 and a normal platelet count of 149.    The patient reports he has been experiencing significant fatigue.  He also has had some issues with right-sided abdominal pain.  He recently underwent a cholecystectomy but the pain has not significantly improved.  He also recently got over shingles.  Currently his only complaints aside from fatigue is some pressure behind his left eye and occasional left-sided nosebleeds.  He has been seen by ENT, Dr. Stout, who plans for CT of the head to evaluate his complaint.    The patient is only on 3 medications including a statin, spironolactone, and quinapril.  He has been on these medications for many years without any side effects.    Review of Systems   Constitutional: Positive for fatigue. Negative for appetite change, diaphoresis, fever, unexpected weight gain and unexpected weight loss.   HENT: Positive for nosebleeds. Negative for hearing loss, sore throat and voice change.    Eyes: Negative for blurred vision, double vision, pain, redness and visual disturbance.   Respiratory: Negative for cough, shortness of breath and wheezing.    Cardiovascular: Negative for chest pain, palpitations and leg swelling.   Endocrine: Negative for cold intolerance, heat intolerance, polydipsia and polyuria.   Genitourinary:  Negative for decreased urine volume, difficulty urinating, frequency and urinary incontinence.   Musculoskeletal: Negative for arthralgias, back pain, joint swelling and myalgias.   Skin: Negative for color change, rash, skin lesions and wound.   Neurological: Negative for dizziness, seizures, numbness and headache.   Hematological: Negative for adenopathy. Does not bruise/bleed easily.   Psychiatric/Behavioral: Negative for depressed mood. The patient is not nervous/anxious.    All other systems reviewed and are negative.      Past Medical History:   Diagnosis Date   • Cervical root disorders, not elsewhere classified    • Essential (primary) hypertension    • ETOH abuse    • High cholesterol    • Impaired fasting glucose    • Pure hypercholesterolemia    • Sleep apnea, unspecified    • Transient cerebral ischemic attack, unspecified    • Vitamin D deficiency, unspecified      Past Surgical History:   Procedure Laterality Date   • CHOLECYSTECTOMY  08/2019   • ENDOSCOPY AND COLONOSCOPY  2008   • KNEE SURGERY Right    • OTHER SURGICAL HISTORY      TUBAL ADENOMA   • OTHER SURGICAL HISTORY Left     CHEEK   • THYROID CYST EXCISION  2000     Social History     Socioeconomic History   • Marital status:    Tobacco Use   • Smoking status: Never Smoker   • Smokeless tobacco: Never Used   Vaping Use   • Vaping Use: Never used   Substance and Sexual Activity   • Alcohol use: Yes     Alcohol/week: 3.0 standard drinks     Types: 3 Cans of beer per week     Comment: 3 per day   • Drug use: Never   • Sexual activity: Defer     Family History   Problem Relation Age of Onset   • Cancer Mother    • Cancer Father    • Hyperlipidemia Father    • COPD Father    • Cancer Paternal Grandfather        Objective   Physical Exam  General: Alert, cooperative, no acute distress  Eyes: Anicteric sclera, PERRLA  Respiratory: normal respiratory effort  Cardiovascular: no lower extremity edema  Skin: Normal tone, no rash, no  lesions  Psychiatric: Appropriate affect, intact judgment  Neurologic: No focal sensory or motor deficits, normal cognition   Musculoskeletal: Normal muscle strength and tone  Extremities: No clubbing, cyanosis, or deformities    Vitals:    04/14/22 1505   BP: 157/81   Pulse: 82   Resp: 18   Temp: 98.7 °F (37.1 °C)   SpO2: 98%   Weight: 110 kg (243 lb 9.7 oz)   PainSc: 0-No pain               PHQ-9 Total Score:         Result Review :   The following data was reviewed by: Dione Nayak MD PhD on 04/14/2022:  Lab Results   Component Value Date    HGB 14.4 03/29/2022    HCT 40.3 03/29/2022    MCV 94.4 03/29/2022     03/29/2022    WBC 3.17 (L) 03/29/2022    NEUTROABS 1.28 (L) 03/29/2022    LYMPHSABS 1.70 03/29/2022    MONOSABS 0.16 03/29/2022    EOSABS 0.02 03/29/2022    BASOSABS 0.01 03/29/2022     Lab Results   Component Value Date    GLUCOSE 119 (H) 03/29/2022    BUN 16 03/29/2022    CREATININE 0.99 03/29/2022     03/29/2022    K 4.6 03/29/2022     03/29/2022    CO2 27.1 03/29/2022    CALCIUM 9.7 03/29/2022    PROTEINTOT 7.1 03/29/2022    ALBUMIN 4.90 03/29/2022    BILITOT 0.5 03/29/2022    ALKPHOS 50 03/29/2022    AST 22 03/29/2022    ALT 30 03/29/2022          Assessment and Plan    Diagnoses and all orders for this visit:    1. Neutropenia, unspecified type (HCC) (Primary)  -     CBC & Differential; Future  -     Lactate Dehydrogenase; Future  -     Flow Cytometry, Blood; Future  -     Uric Acid; Future  -     Peripheral Blood Smear; Future      Mild neutropenia: Patient has new onset neutropenia that has worsened slightly over the last 7 months.  His platelet count and hemoglobin remain within normal limits.  I will check a CBC, peripheral smear, flow cytometry, uric acid, and LDH.  The patient will follow up with me in approximately 2 weeks to discuss results.  If his density decreases further and cause is not evident from these labs, I will send the patient for a bone marrow biopsy to  complete the evaluation.    Patient was given instructions and counseling regarding his condition or for health maintenance advice. Please see specific information pulled into the AVS if appropriate.

## 2022-04-18 ENCOUNTER — LAB (OUTPATIENT)
Dept: ONCOLOGY | Facility: HOSPITAL | Age: 67
End: 2022-04-18

## 2022-04-18 DIAGNOSIS — D70.9 NEUTROPENIA, UNSPECIFIED TYPE: ICD-10-CM

## 2022-04-18 LAB
BASOPHILS # BLD AUTO: 0.01 10*3/MM3 (ref 0–0.2)
BASOPHILS NFR BLD AUTO: 0.4 % (ref 0–1.5)
DEPRECATED RDW RBC AUTO: 48 FL (ref 37–54)
EOSINOPHIL # BLD AUTO: 0.03 10*3/MM3 (ref 0–0.4)
EOSINOPHIL NFR BLD AUTO: 1.1 % (ref 0.3–6.2)
ERYTHROCYTE [DISTWIDTH] IN BLOOD BY AUTOMATED COUNT: 13.5 % (ref 12.3–15.4)
HCT VFR BLD AUTO: 39.7 % (ref 37.5–51)
HGB BLD-MCNC: 13.8 G/DL (ref 13–17.7)
IMM GRANULOCYTES # BLD AUTO: 0 10*3/MM3 (ref 0–0.05)
IMM GRANULOCYTES NFR BLD AUTO: 0 % (ref 0–0.5)
LDH SERPL-CCNC: 161 U/L (ref 135–225)
LYMPHOCYTES # BLD AUTO: 1.28 10*3/MM3 (ref 0.7–3.1)
LYMPHOCYTES # BLD MANUAL: 1.06 10*3/MM3 (ref 0.7–3.1)
LYMPHOCYTES NFR BLD AUTO: 45.2 % (ref 19.6–45.3)
LYMPHOCYTES NFR BLD MANUAL: 15.7 % (ref 5–12)
MCH RBC QN AUTO: 33.3 PG (ref 26.6–33)
MCHC RBC AUTO-ENTMCNC: 34.8 G/DL (ref 31.5–35.7)
MCV RBC AUTO: 95.9 FL (ref 79–97)
MONOCYTES # BLD AUTO: 0.24 10*3/MM3 (ref 0.1–0.9)
MONOCYTES # BLD: 0.44 10*3/MM3 (ref 0.1–0.9)
MONOCYTES NFR BLD AUTO: 8.5 % (ref 5–12)
NEUTROPHILS # BLD AUTO: 1.33 10*3/MM3 (ref 1.7–7)
NEUTROPHILS NFR BLD AUTO: 1.27 10*3/MM3 (ref 1.7–7)
NEUTROPHILS NFR BLD AUTO: 44.8 % (ref 42.7–76)
NEUTROPHILS NFR BLD MANUAL: 43.1 % (ref 42.7–76)
NEUTS BAND NFR BLD MANUAL: 3.9 % (ref 0–5)
PATHOLOGY REVIEW: YES
PLATELET # BLD AUTO: 127 10*3/MM3 (ref 140–450)
PMV BLD AUTO: 9.2 FL (ref 6–12)
POLYCHROMASIA BLD QL SMEAR: ABNORMAL
RBC # BLD AUTO: 4.14 10*6/MM3 (ref 4.14–5.8)
SMALL PLATELETS BLD QL SMEAR: ABNORMAL
URATE SERPL-MCNC: 6.6 MG/DL (ref 3.4–7)
VARIANT LYMPHS NFR BLD MANUAL: 2 % (ref 0–5)
VARIANT LYMPHS NFR BLD MANUAL: 35.3 % (ref 19.6–45.3)
WBC MORPH BLD: NORMAL
WBC NRBC COR # BLD: 2.83 10*3/MM3 (ref 3.4–10.8)

## 2022-04-18 PROCEDURE — 85025 COMPLETE CBC W/AUTO DIFF WBC: CPT

## 2022-04-18 PROCEDURE — 36415 COLL VENOUS BLD VENIPUNCTURE: CPT

## 2022-04-18 PROCEDURE — 84550 ASSAY OF BLOOD/URIC ACID: CPT

## 2022-04-18 PROCEDURE — 83615 LACTATE (LD) (LDH) ENZYME: CPT

## 2022-04-19 LAB
CYTO UR: NORMAL
LAB AP CASE REPORT: NORMAL
LAB AP CLINICAL INFORMATION: NORMAL
PATH REPORT.FINAL DX SPEC: NORMAL
REF LAB TEST METHOD: NORMAL

## 2022-04-26 ENCOUNTER — OFFICE VISIT (OUTPATIENT)
Dept: ONCOLOGY | Facility: HOSPITAL | Age: 67
End: 2022-04-26

## 2022-04-26 ENCOUNTER — LAB (OUTPATIENT)
Dept: ONCOLOGY | Facility: HOSPITAL | Age: 67
End: 2022-04-26

## 2022-04-26 VITALS
BODY MASS INDEX: 38.77 KG/M2 | OXYGEN SATURATION: 95 % | TEMPERATURE: 97.2 F | WEIGHT: 240.08 LBS | RESPIRATION RATE: 16 BRPM | HEART RATE: 72 BPM | SYSTOLIC BLOOD PRESSURE: 151 MMHG | DIASTOLIC BLOOD PRESSURE: 85 MMHG

## 2022-04-26 DIAGNOSIS — D70.9 NEUTROPENIA, UNSPECIFIED TYPE: ICD-10-CM

## 2022-04-26 DIAGNOSIS — C91.40 HAIRY CELL LEUKEMIA NOT HAVING ACHIEVED REMISSION: Primary | ICD-10-CM

## 2022-04-26 DIAGNOSIS — R53.82 CHRONIC FATIGUE, UNSPECIFIED: ICD-10-CM

## 2022-04-26 DIAGNOSIS — R04.0 BLEEDING NOSE: ICD-10-CM

## 2022-04-26 LAB
BASOPHILS # BLD AUTO: 0.02 10*3/MM3 (ref 0–0.2)
BASOPHILS NFR BLD AUTO: 0.7 % (ref 0–1.5)
DEPRECATED RDW RBC AUTO: 48.4 FL (ref 37–54)
EOSINOPHIL # BLD AUTO: 0.04 10*3/MM3 (ref 0–0.4)
EOSINOPHIL NFR BLD AUTO: 1.3 % (ref 0.3–6.2)
ERYTHROCYTE [DISTWIDTH] IN BLOOD BY AUTOMATED COUNT: 13.4 % (ref 12.3–15.4)
HCT VFR BLD AUTO: 40.4 % (ref 37.5–51)
HGB BLD-MCNC: 14 G/DL (ref 13–17.7)
IMM GRANULOCYTES # BLD AUTO: 0 10*3/MM3 (ref 0–0.05)
IMM GRANULOCYTES NFR BLD AUTO: 0 % (ref 0–0.5)
LYMPHOCYTES # BLD AUTO: 1.44 10*3/MM3 (ref 0.7–3.1)
LYMPHOCYTES NFR BLD AUTO: 48.5 % (ref 19.6–45.3)
MCH RBC QN AUTO: 33.4 PG (ref 26.6–33)
MCHC RBC AUTO-ENTMCNC: 34.7 G/DL (ref 31.5–35.7)
MCV RBC AUTO: 96.4 FL (ref 79–97)
MONOCYTES # BLD AUTO: 0.17 10*3/MM3 (ref 0.1–0.9)
MONOCYTES NFR BLD AUTO: 5.7 % (ref 5–12)
NEUTROPHILS NFR BLD AUTO: 1.3 10*3/MM3 (ref 1.7–7)
NEUTROPHILS NFR BLD AUTO: 43.8 % (ref 42.7–76)
PLATELET # BLD AUTO: 124 10*3/MM3 (ref 140–450)
PMV BLD AUTO: 9.1 FL (ref 6–12)
POLYCHROMASIA BLD QL SMEAR: NORMAL
RBC # BLD AUTO: 4.19 10*6/MM3 (ref 4.14–5.8)
SMALL PLATELETS BLD QL SMEAR: NORMAL
WBC MORPH BLD: NORMAL
WBC NRBC COR # BLD: 2.97 10*3/MM3 (ref 3.4–10.8)

## 2022-04-26 PROCEDURE — 85007 BL SMEAR W/DIFF WBC COUNT: CPT

## 2022-04-26 PROCEDURE — 85025 COMPLETE CBC W/AUTO DIFF WBC: CPT

## 2022-04-26 PROCEDURE — 99215 OFFICE O/P EST HI 40 MIN: CPT | Performed by: INTERNAL MEDICINE

## 2022-04-26 PROCEDURE — 36415 COLL VENOUS BLD VENIPUNCTURE: CPT

## 2022-04-26 PROCEDURE — G0463 HOSPITAL OUTPT CLINIC VISIT: HCPCS | Performed by: INTERNAL MEDICINE

## 2022-04-26 PROCEDURE — G2212 PROLONG OUTPT/OFFICE VIS: HCPCS | Performed by: INTERNAL MEDICINE

## 2022-04-26 NOTE — PROGRESS NOTES
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 CD45+, CD5-, CD10-, Cd11c+, CD19+ (bright), CD20+ (bright), CD22+, CD23-, CD25+, CD38-, +, FMC7+, HLA-DR+, sIg kappa+. Due to the low level of clonal B-cells, BRAF V600E testing was not added to testing.  Peripheral blood showed leukopenia with absolute neutropenia; tc cells not readily identified. Normal RBCs, minimal thrombocytopenia with normal morphology.      Hairy cell leukemia (HCC)   4/26/2022 Initial Diagnosis    Hairy cell leukemia (HCC)         HPI  Patient comes in today for the results of his recent labs.  He had already seen the results on [facility].  I explained the significance of the diagnosis of hairy cell leukemia.  During the clinic visit we talked about the possibility of monitoring his counts for an extended period versus starting treatment.  He recently had scans and will follow up with Dr. Stout and ENT this coming Friday.  He still has pressure or pain in the left side of his face and intermittent nosebleeds.    After the clinic visit I was able to review the results of flow cytometry in more detail.  They were not able to send some molecular testing due to the low level of hairy cells in his peripheral blood.  For that reason I would recommend proceeding with a bone marrow biopsy so that we have complete information regarding his prognosis and treatment plan. I called the patient to discuss this in more detail. The patient agrees with this.      He is most bothered by severe fatigue.  He is unable to work a full week at the pace he is used to.  He  often goes to bed at 8 or 9pm.  He only feels well in the morning but is wiped out by afternoon.      Review of Systems   Constitutional: Positive for fatigue. Negative for appetite change, diaphoresis, fever, unexpected weight gain and unexpected weight loss.   HENT: Negative for hearing loss, sore throat and voice change.    Eyes: Negative for blurred vision, double vision, pain, redness and visual disturbance.   Respiratory: Negative for cough, shortness of breath and wheezing.    Cardiovascular: Negative for chest pain, palpitations and leg swelling.   Endocrine: Negative for cold intolerance, heat intolerance, polydipsia and polyuria.   Genitourinary: Negative for decreased urine volume, difficulty urinating, frequency and urinary incontinence.   Musculoskeletal: Negative for arthralgias, back pain, joint swelling and myalgias.   Skin: Negative for color change, rash, skin lesions and wound.   Neurological: Negative for dizziness, seizures, numbness and headache.   Hematological: Negative for adenopathy. Does not bruise/bleed easily.   Psychiatric/Behavioral: Negative for depressed mood. The patient is not nervous/anxious.    All other systems reviewed and are negative.      Past Medical History:   Diagnosis Date   • Cervical root disorders, not elsewhere classified    • Essential (primary) hypertension    • ETOH abuse    • High cholesterol    • Impaired fasting glucose    • Pure hypercholesterolemia    • Sleep apnea, unspecified    • Transient cerebral ischemic attack, unspecified    • Vitamin D deficiency, unspecified      Past Surgical History:   Procedure Laterality Date   • CHOLECYSTECTOMY  08/2019   • ENDOSCOPY AND COLONOSCOPY  2008   • KNEE SURGERY Right    • OTHER SURGICAL HISTORY      TUBAL ADENOMA   • OTHER SURGICAL HISTORY Left     CHEEK   • THYROID CYST EXCISION  2000     Social History     Socioeconomic History   • Marital status:    Tobacco Use   • Smoking status: Never Smoker   • Smokeless  tobacco: Never Used   Vaping Use   • Vaping Use: Never used   Substance and Sexual Activity   • Alcohol use: Yes     Alcohol/week: 3.0 standard drinks     Types: 3 Cans of beer per week     Comment: 3 per day   • Drug use: Never   • Sexual activity: Defer     Family History   Problem Relation Age of Onset   • Cancer Mother    • Cancer Father    • Hyperlipidemia Father    • COPD Father    • Cancer Paternal Grandfather        Objective   Physical Exam  General: Alert, cooperative, no acute distress  Eyes: Anicteric sclera, PERRLA  Respiratory: normal respiratory effort  Cardiovascular: no lower extremity edema  Skin: Normal tone, no rash, no lesions  Psychiatric: Appropriate affect, intact judgment  Neurologic: No focal sensory or motor deficits, normal cognition   Musculoskeletal: Normal muscle strength and tone  Extremities: No clubbing, cyanosis, or deformities    Vitals:    04/26/22 0837   BP: 151/85   Pulse: 72   Resp: 16   Temp: 97.2 °F (36.2 °C)   SpO2: 95%   Weight: 109 kg (240 lb 1.3 oz)   PainSc: 0-No pain     ECOG score: 0         PHQ-9 Total Score:         Result Review :   The following data was reviewed by: Dione Nayak MD PhD on 04/26/2022:  Lab Results   Component Value Date    HGB 14.0 04/26/2022    HCT 40.4 04/26/2022    MCV 96.4 04/26/2022     (L) 04/26/2022    WBC 2.97 (L) 04/26/2022    NEUTROABS 1.30 (L) 04/26/2022    LYMPHSABS 1.44 04/26/2022    MONOSABS 0.17 04/26/2022    EOSABS 0.04 04/26/2022    BASOSABS 0.02 04/26/2022     Lab Results   Component Value Date    GLUCOSE 119 (H) 03/29/2022    BUN 16 03/29/2022    CREATININE 0.99 03/29/2022     03/29/2022    K 4.6 03/29/2022     03/29/2022    CO2 27.1 03/29/2022    CALCIUM 9.7 03/29/2022    PROTEINTOT 7.1 03/29/2022    ALBUMIN 4.90 03/29/2022    BILITOT 0.5 03/29/2022    ALKPHOS 50 03/29/2022    AST 22 03/29/2022    ALT 30 03/29/2022          Assessment and Plan    Diagnoses and all orders for this visit:    1. 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 Neutropenia, unspecified type (HCC)  -     CBC & Differential; Future  -     CBC & Differential; Future    3. 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 Some patients with HCL are monitored without treatment for a long time.  Others progress more quickly and require treatment. Indications for treatment are in general based on factors such as splenomegaly, leukocytosis and associated cytopenias, presence of numerous riki cells in peripheral blood and specific gene mutations.  I will send the patient for a bone marrow biopsy with molecular testing for IGHV4-34 rearrangement and BRAF V600E.  I will plan to repeat CBC and add LDH, beta-2-microglobulin, and a hepatitis panel to his labs when he returns in 1 month.  I will also plan to get an US of the abdomen to assess liver and spleen size for risk stratification.      Mild Neutropenia:  Likely secondary to Riki Cell Leukemia diagnosis. This can be further assessed by bone marrow biopsy. I will also send quantitative immunoglobulins.     Nose Bleeds on left: associated with sinus pressure.  The pt underwent imaging and will f/u with Dr. Stout in ENT for evaluation this Friday.  The patient will call our clinic with any updates that are relevant to our treatment plan.     I spent 75 minutes caring for Dung on this date of service. This time includes time spent by me in the following activities: preparing for the visit, reviewing tests, obtaining and/or reviewing a separately obtained history, performing a medically appropriate examination and/or evaluation, counseling and educating the patient/family/caregiver, ordering medications, tests, or procedures, referring and communicating with other health care professionals and documenting information in the medical record    Patient was given instructions and counseling regarding his condition or for  health maintenance advice. Please see specific information pulled into the AVS if appropriate.

## 2022-04-29 ENCOUNTER — APPOINTMENT (OUTPATIENT)
Dept: ONCOLOGY | Facility: HOSPITAL | Age: 67
End: 2022-04-29

## 2022-05-03 ENCOUNTER — HOSPITAL ENCOUNTER (OUTPATIENT)
Dept: CT IMAGING | Facility: HOSPITAL | Age: 67
Discharge: HOME | End: 2022-05-03
Admitting: INTERNAL MEDICINE

## 2022-05-03 VITALS
HEIGHT: 66 IN | RESPIRATION RATE: 14 BRPM | SYSTOLIC BLOOD PRESSURE: 120 MMHG | BODY MASS INDEX: 38.57 KG/M2 | HEART RATE: 76 BPM | DIASTOLIC BLOOD PRESSURE: 76 MMHG | OXYGEN SATURATION: 95 % | WEIGHT: 240 LBS

## 2022-05-03 DIAGNOSIS — C91.40 HAIRY CELL LEUKEMIA NOT HAVING ACHIEVED REMISSION: ICD-10-CM

## 2022-05-03 LAB
ANISOCYTOSIS BLD QL: ABNORMAL
ANISOCYTOSIS BLD QL: NORMAL
BASOPHILS # BLD AUTO: 0.01 10*3/MM3 (ref 0–0.2)
BASOPHILS # BLD MANUAL: 0.03 10*3/MM3 (ref 0–0.2)
BASOPHILS NFR BLD AUTO: 0.4 % (ref 0–1.5)
BASOPHILS NFR BLD MANUAL: 1 % (ref 0–1.5)
DEPRECATED RDW RBC AUTO: 46.1 FL (ref 37–54)
EOSINOPHIL # BLD AUTO: 0.05 10*3/MM3 (ref 0–0.4)
EOSINOPHIL # BLD MANUAL: 0.03 10*3/MM3 (ref 0–0.4)
EOSINOPHIL NFR BLD AUTO: 1.9 % (ref 0.3–6.2)
EOSINOPHIL NFR BLD MANUAL: 1 % (ref 0.3–6.2)
ERYTHROCYTE [DISTWIDTH] IN BLOOD BY AUTOMATED COUNT: 13.2 % (ref 12.3–15.4)
HCT VFR BLD AUTO: 38.3 % (ref 37.5–51)
HGB BLD-MCNC: 13.6 G/DL (ref 13–17.7)
HYPOCHROMIA BLD QL: NORMAL
LYMPHOCYTES # BLD AUTO: 1.31 10*3/MM3 (ref 0.7–3.1)
LYMPHOCYTES # BLD MANUAL: 1.4 10*3/MM3 (ref 0.7–3.1)
LYMPHOCYTES NFR BLD AUTO: 48.5 % (ref 19.6–45.3)
LYMPHOCYTES NFR BLD MANUAL: 2 % (ref 5–12)
MACROCYTES BLD QL SMEAR: ABNORMAL
MACROCYTES BLD QL SMEAR: NORMAL
MCH RBC QN AUTO: 33.7 PG (ref 26.6–33)
MCHC RBC AUTO-ENTMCNC: 35.5 G/DL (ref 31.5–35.7)
MCV RBC AUTO: 95 FL (ref 79–97)
MONOCYTES # BLD AUTO: 0.21 10*3/MM3 (ref 0.1–0.9)
MONOCYTES # BLD: 0.05 10*3/MM3 (ref 0.1–0.9)
MONOCYTES NFR BLD AUTO: 7.8 % (ref 5–12)
NEUTROPHILS # BLD AUTO: 1.19 10*3/MM3 (ref 1.7–7)
NEUTROPHILS NFR BLD AUTO: 1.11 10*3/MM3 (ref 1.7–7)
NEUTROPHILS NFR BLD AUTO: 41 % (ref 42.7–76)
NEUTROPHILS NFR BLD MANUAL: 44 % (ref 42.7–76)
PLATELET # BLD AUTO: 127 10*3/MM3 (ref 140–450)
PMV BLD AUTO: 9.6 FL (ref 6–12)
RBC # BLD AUTO: 4.03 10*6/MM3 (ref 4.14–5.8)
SCAN SLIDE: NORMAL
SMALL PLATELETS BLD QL SMEAR: ABNORMAL
SMALL PLATELETS BLD QL SMEAR: NORMAL
VARIANT LYMPHS NFR BLD MANUAL: 3 % (ref 0–5)
VARIANT LYMPHS NFR BLD MANUAL: 49 % (ref 19.6–45.3)
WBC MORPH BLD: NORMAL
WBC NRBC COR # BLD: 2.7 10*3/MM3 (ref 3.4–10.8)

## 2022-05-03 PROCEDURE — 85025 COMPLETE CBC W/AUTO DIFF WBC: CPT | Performed by: RADIOLOGY

## 2022-05-03 PROCEDURE — 88313 SPECIAL STAINS GROUP 2: CPT | Performed by: INTERNAL MEDICINE

## 2022-05-03 PROCEDURE — 88311 DECALCIFY TISSUE: CPT | Performed by: INTERNAL MEDICINE

## 2022-05-03 PROCEDURE — 25010000002 FENTANYL CITRATE (PF) 50 MCG/ML SOLUTION: Performed by: RADIOLOGY

## 2022-05-03 PROCEDURE — 77012 CT SCAN FOR NEEDLE BIOPSY: CPT

## 2022-05-03 PROCEDURE — 88342 IMHCHEM/IMCYTCHM 1ST ANTB: CPT | Performed by: INTERNAL MEDICINE

## 2022-05-03 PROCEDURE — 85007 BL SMEAR W/DIFF WBC COUNT: CPT | Performed by: RADIOLOGY

## 2022-05-03 PROCEDURE — 88341 IMHCHEM/IMCYTCHM EA ADD ANTB: CPT | Performed by: INTERNAL MEDICINE

## 2022-05-03 PROCEDURE — 25010000002 MIDAZOLAM PER 1 MG: Performed by: RADIOLOGY

## 2022-05-03 PROCEDURE — 88305 TISSUE EXAM BY PATHOLOGIST: CPT | Performed by: INTERNAL MEDICINE

## 2022-05-03 RX ORDER — FENTANYL CITRATE 50 UG/ML
INJECTION, SOLUTION INTRAMUSCULAR; INTRAVENOUS
Status: COMPLETED | OUTPATIENT
Start: 2022-05-03 | End: 2022-05-03

## 2022-05-03 RX ORDER — MIDAZOLAM HYDROCHLORIDE 1 MG/ML
INJECTION INTRAMUSCULAR; INTRAVENOUS
Status: DISPENSED
Start: 2022-05-03 | End: 2022-05-03

## 2022-05-03 RX ORDER — MIDAZOLAM HYDROCHLORIDE 1 MG/ML
INJECTION INTRAMUSCULAR; INTRAVENOUS
Status: COMPLETED | OUTPATIENT
Start: 2022-05-03 | End: 2022-05-03

## 2022-05-03 RX ORDER — FENTANYL CITRATE 50 UG/ML
INJECTION, SOLUTION INTRAMUSCULAR; INTRAVENOUS
Status: DISPENSED
Start: 2022-05-03 | End: 2022-05-03

## 2022-05-03 RX ORDER — LIDOCAINE HYDROCHLORIDE 20 MG/ML
INJECTION, SOLUTION INFILTRATION; PERINEURAL
Status: DISPENSED
Start: 2022-05-03 | End: 2022-05-03

## 2022-05-03 RX ADMIN — MIDAZOLAM HYDROCHLORIDE 1 MG: 1 INJECTION, SOLUTION INTRAMUSCULAR; INTRAVENOUS at 08:54

## 2022-05-03 RX ADMIN — FENTANYL CITRATE 50 MCG: 50 INJECTION, SOLUTION INTRAMUSCULAR; INTRAVENOUS at 08:55

## 2022-05-03 RX ADMIN — FENTANYL CITRATE 50 MCG: 50 INJECTION, SOLUTION INTRAMUSCULAR; INTRAVENOUS at 09:15

## 2022-05-03 RX ADMIN — MIDAZOLAM HYDROCHLORIDE 1 MG: 1 INJECTION, SOLUTION INTRAMUSCULAR; INTRAVENOUS at 09:04

## 2022-05-05 ENCOUNTER — HOSPITAL ENCOUNTER (OUTPATIENT)
Dept: ULTRASOUND IMAGING | Facility: HOSPITAL | Age: 67
Discharge: HOME | End: 2022-05-05
Admitting: INTERNAL MEDICINE

## 2022-05-05 DIAGNOSIS — C91.40 HAIRY CELL LEUKEMIA NOT HAVING ACHIEVED REMISSION: ICD-10-CM

## 2022-05-05 PROCEDURE — 76705 ECHO EXAM OF ABDOMEN: CPT

## 2022-05-06 ENCOUNTER — TELEPHONE (OUTPATIENT)
Dept: INTERNAL MEDICINE | Facility: CLINIC | Age: 67
End: 2022-05-06

## 2022-05-06 DIAGNOSIS — C91.40 HAIRY CELL LEUKEMIA NOT HAVING ACHIEVED REMISSION: Primary | ICD-10-CM

## 2022-05-06 LAB — REF LAB TEST METHOD: NORMAL

## 2022-05-06 NOTE — TELEPHONE ENCOUNTER
Caller: Dung Burch    Relationship: Self    Best call back number: [phone]    What is the best time to reach you: ANYTIME    Who are you requesting to speak with (clinical staff, provider,  specific staff member): CLINICAL    Do you know the name of the person who called: PATIENT    What was the call regarding:HE HAS BEEN DIAGNOSED WITH LEUKEMIA. HE WOULD LIKE TO SPEAK TO DR ALCALA AND GET HIS OPINION ABOUT SOME THINGS.    Do you require a callback: YES

## 2022-05-07 NOTE — TELEPHONE ENCOUNTER
I CALLED PT WITH UPDATE ON MY CONVERSATION WITH DR HENDERSON ---AND HIS CONCERNS --, I told him to reach back out to me this week if he is not heard from Dr. Henderson, he is just very concerned,,  not sure where direction they are going to go with etc. since he has had his last test and just wants to make sure that he does not need to do anything else?

## 2022-05-11 LAB — REF LAB TEST METHOD: NORMAL

## 2022-05-13 LAB
KARYOTYP MAR: NORMAL
REF LAB TEST METHOD: NORMAL

## 2022-05-16 LAB
CYTO UR: NORMAL
DIFF PNL MAR: NORMAL
LAB AP ASPIRATE SMEAR: NORMAL
LAB AP CASE REPORT: NORMAL
LAB AP CBC AND DIFFERENTIAL: NORMAL
LAB AP CLINICAL INFORMATION: NORMAL
LAB AP CLOT SECTION: NORMAL
LAB AP CORE BIOPSY: NORMAL
LAB AP DIAGNOSIS COMMENT: NORMAL
LAB AP SPECIAL STAINS: NORMAL
PATH REPORT.FINAL DX SPEC: NORMAL
PATH REPORT.GROSS SPEC: NORMAL

## 2022-05-26 ENCOUNTER — OFFICE VISIT (OUTPATIENT)
Dept: ONCOLOGY | Facility: HOSPITAL | Age: 67
End: 2022-05-26

## 2022-05-26 ENCOUNTER — LAB (OUTPATIENT)
Dept: ONCOLOGY | Facility: HOSPITAL | Age: 67
End: 2022-05-26

## 2022-05-26 VITALS
DIASTOLIC BLOOD PRESSURE: 77 MMHG | SYSTOLIC BLOOD PRESSURE: 142 MMHG | OXYGEN SATURATION: 94 % | BODY MASS INDEX: 39.03 KG/M2 | HEART RATE: 72 BPM | WEIGHT: 241.84 LBS | RESPIRATION RATE: 16 BRPM | TEMPERATURE: 96.9 F

## 2022-05-26 DIAGNOSIS — D70.9 NEUTROPENIA, UNSPECIFIED TYPE: ICD-10-CM

## 2022-05-26 DIAGNOSIS — R53.82 CHRONIC FATIGUE, UNSPECIFIED: ICD-10-CM

## 2022-05-26 DIAGNOSIS — C91.40 HAIRY CELL LEUKEMIA NOT HAVING ACHIEVED REMISSION: Primary | ICD-10-CM

## 2022-05-26 DIAGNOSIS — C91.40 HAIRY CELL LEUKEMIA NOT HAVING ACHIEVED REMISSION: ICD-10-CM

## 2022-05-26 DIAGNOSIS — R53.83 OTHER FATIGUE: ICD-10-CM

## 2022-05-26 DIAGNOSIS — E66.09 CLASS 2 OBESITY DUE TO EXCESS CALORIES WITHOUT SERIOUS COMORBIDITY WITH BODY MASS INDEX (BMI) OF 39.0 TO 39.9 IN ADULT: ICD-10-CM

## 2022-05-26 LAB
B2 MICROGLOB SERPL-MCNC: 1.8 MG/L (ref 0.8–2.2)
BASOPHILS # BLD AUTO: 0.01 10*3/MM3 (ref 0–0.2)
BASOPHILS NFR BLD AUTO: 0.3 % (ref 0–1.5)
DEPRECATED RDW RBC AUTO: 43.3 FL (ref 37–54)
EOSINOPHIL # BLD AUTO: 0.03 10*3/MM3 (ref 0–0.4)
EOSINOPHIL NFR BLD AUTO: 0.9 % (ref 0.3–6.2)
ERYTHROCYTE [DISTWIDTH] IN BLOOD BY AUTOMATED COUNT: 12.4 % (ref 12.3–15.4)
HAV IGM SERPL QL IA: NORMAL
HBV CORE IGM SERPL QL IA: NORMAL
HBV SURFACE AG SERPL QL IA: NORMAL
HCT VFR BLD AUTO: 39 % (ref 37.5–51)
HCV AB SER DONR QL: NORMAL
HGB BLD-MCNC: 13.9 G/DL (ref 13–17.7)
HYPOCHROMIA BLD QL: NORMAL
IGA1 MFR SER: 93 MG/DL (ref 70–400)
IGG1 SER-MCNC: 661 MG/DL (ref 700–1600)
IGM SERPL-MCNC: 34 MG/DL (ref 40–230)
IMM GRANULOCYTES # BLD AUTO: 0.01 10*3/MM3 (ref 0–0.05)
IMM GRANULOCYTES NFR BLD AUTO: 0.3 % (ref 0–0.5)
LARGE PLATELETS: NORMAL
LDH SERPL-CCNC: 193 U/L (ref 135–225)
LYMPHOCYTES # BLD AUTO: 1.38 10*3/MM3 (ref 0.7–3.1)
LYMPHOCYTES NFR BLD AUTO: 42.2 % (ref 19.6–45.3)
MCH RBC QN AUTO: 33.6 PG (ref 26.6–33)
MCHC RBC AUTO-ENTMCNC: 35.6 G/DL (ref 31.5–35.7)
MCV RBC AUTO: 94.2 FL (ref 79–97)
MONOCYTES # BLD AUTO: 0.15 10*3/MM3 (ref 0.1–0.9)
MONOCYTES NFR BLD AUTO: 4.6 % (ref 5–12)
NEUTROPHILS NFR BLD AUTO: 1.69 10*3/MM3 (ref 1.7–7)
NEUTROPHILS NFR BLD AUTO: 51.7 % (ref 42.7–76)
PLATELET # BLD AUTO: 152 10*3/MM3 (ref 140–450)
PMV BLD AUTO: 9.1 FL (ref 6–12)
RBC # BLD AUTO: 4.14 10*6/MM3 (ref 4.14–5.8)
SMALL PLATELETS BLD QL SMEAR: ADEQUATE
WBC MORPH BLD: NORMAL
WBC NRBC COR # BLD: 3.27 10*3/MM3 (ref 3.4–10.8)

## 2022-05-26 PROCEDURE — 99215 OFFICE O/P EST HI 40 MIN: CPT | Performed by: INTERNAL MEDICINE

## 2022-05-26 PROCEDURE — 82232 ASSAY OF BETA-2 PROTEIN: CPT

## 2022-05-26 PROCEDURE — G0463 HOSPITAL OUTPT CLINIC VISIT: HCPCS | Performed by: INTERNAL MEDICINE

## 2022-05-26 PROCEDURE — 82784 ASSAY IGA/IGD/IGG/IGM EACH: CPT

## 2022-05-26 PROCEDURE — 85025 COMPLETE CBC W/AUTO DIFF WBC: CPT

## 2022-05-26 PROCEDURE — 36415 COLL VENOUS BLD VENIPUNCTURE: CPT

## 2022-05-26 PROCEDURE — 85007 BL SMEAR W/DIFF WBC COUNT: CPT

## 2022-05-26 PROCEDURE — 83615 LACTATE (LD) (LDH) ENZYME: CPT

## 2022-05-26 PROCEDURE — 80074 ACUTE HEPATITIS PANEL: CPT

## 2022-05-26 NOTE — PROGRESS NOTES
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 CD45+, CD5-, CD10-, Cd11c+, CD19+ (bright), CD20+ (bright), CD22+, CD23-, CD25+, CD38-, +, FMC7+, HLA-DR+, sIg kappa+. Due to the low level of clonal B-cells, BRAF V600E testing was not added to testing.  Peripheral blood showed leukopenia with absolute neutropenia; tc cells not readily identified. Normal RBCs, minimal thrombocytopenia with normal morphology.   - Bone Marrow Biopsy: confirmed Tc Cell Leukemia diagnosis, normal cytogenetics 46XY, IgVH somatic hypermutation not detected. Flow cytometry: Monotypic (clonal) B-cell population (4.5% of the leukocytes), immunophenotypically consistent with hairy cell leukemia.     Hairy cell leukemia (HCC)   4/26/2022 Initial Diagnosis    Hairy cell leukemia (HCC)         HPI  Patient comes in today for follow-up.  I reviewed the results of his recent bone marrow biopsy on 5/3/2022.  I discussed the results with the patient and his wife.  He also had an ultrasound of the abdomen on 5/3/2022 which shows only borderline splenic enlargement.  He continues to feel very fatigued.  He says he has a difficult time working the number of hours he is used to.  He has to take frequent breaks.  We discussed indications for treatment.  I pulled up an article which spelled out the decision tree for treatment.  He currently does not meet the criteria of worsening cytopenias, splenomegaly, or secondary illnesses such as frequent infections.  The current recommended treatment is cladribine which is a single course treatment over 5 days.  It  brings the risk of severe cytopenias and increased risk of infection.  I would also expect that he would feel significantly worse for a month or more before he may improve.  I did suggest the patient get a second opinion if he is unsure that he would like to start treatment.    Review of Systems   Constitutional: Positive for fatigue. Negative for appetite change, diaphoresis, fever, unexpected weight gain and unexpected weight loss.   HENT: Negative for hearing loss, sore throat and voice change.    Eyes: Negative for blurred vision, double vision, pain, redness and visual disturbance.   Respiratory: Negative for cough, shortness of breath and wheezing.    Cardiovascular: Negative for chest pain, palpitations and leg swelling.   Endocrine: Negative for cold intolerance, heat intolerance, polydipsia and polyuria.   Genitourinary: Negative for decreased urine volume, difficulty urinating, frequency and urinary incontinence.   Musculoskeletal: Negative for arthralgias, back pain, joint swelling and myalgias.   Skin: Negative for color change, rash, skin lesions and wound.   Neurological: Negative for dizziness, seizures, numbness and headache.   Hematological: Negative for adenopathy. Does not bruise/bleed easily.   Psychiatric/Behavioral: Negative for depressed mood. The patient is not nervous/anxious.    All other systems reviewed and are negative.      Past Medical History:   Diagnosis Date   • Cervical root disorders, not elsewhere classified    • Essential (primary) hypertension    • ETOH abuse    • Hairy cell leukemia (HCC)    • High cholesterol    • Impaired fasting glucose    • Pure hypercholesterolemia    • Sleep apnea, unspecified    • Transient cerebral ischemic attack, unspecified    • Vitamin D deficiency, unspecified      Past Surgical History:   Procedure Laterality Date   • CHOLECYSTECTOMY  08/2019   • ENDOSCOPY AND COLONOSCOPY  2008   • KNEE SURGERY Right    • OTHER SURGICAL HISTORY      TUBAL ADENOMA    • OTHER SURGICAL HISTORY Left     CHEEK   • THYROID CYST EXCISION  2000     Social History     Socioeconomic History   • Marital status:    Tobacco Use   • Smoking status: Never Smoker   • Smokeless tobacco: Never Used   Vaping Use   • Vaping Use: Never used   Substance and Sexual Activity   • Alcohol use: Yes     Alcohol/week: 3.0 standard drinks     Types: 3 Cans of beer per week     Comment: 3 per day   • Drug use: Never   • Sexual activity: Defer     Family History   Problem Relation Age of Onset   • Cancer Mother    • Cancer Father    • Hyperlipidemia Father    • COPD Father    • Cancer Paternal Grandfather        Objective   Physical Exam  General: Alert, cooperative, no acute distress  Eyes: Anicteric sclera, PERRLA  Respiratory: normal respiratory effort  Cardiovascular: no lower extremity edema  Skin: Normal tone, no rash, no lesions  Psychiatric: Appropriate affect, intact judgment  Neurologic: No focal sensory or motor deficits, normal cognition   Musculoskeletal: Normal muscle strength and tone  Extremities: No clubbing, cyanosis, or deformities    Vitals:    05/26/22 0939   BP: 142/77   Pulse: 72   Resp: 16   Temp: 96.9 °F (36.1 °C)   SpO2: 94%   Weight: 110 kg (241 lb 13.5 oz)   PainSc: 0-No pain     ECOG score: 0         PHQ-9 Total Score:         Result Review :   The following data was reviewed by: Dione Nayak MD PhD on 05/26/2022:  Lab Results   Component Value Date    HGB 13.9 05/26/2022    HCT 39.0 05/26/2022    MCV 94.2 05/26/2022     05/26/2022    WBC 3.27 (L) 05/26/2022    NEUTROABS 1.69 (L) 05/26/2022    LYMPHSABS 1.38 05/26/2022    MONOSABS 0.15 05/26/2022    EOSABS 0.03 05/26/2022    BASOSABS 0.01 05/26/2022     Lab Results   Component Value Date    GLUCOSE 119 (H) 03/29/2022    BUN 16 03/29/2022    CREATININE 0.99 03/29/2022     03/29/2022    K 4.6 03/29/2022     03/29/2022    CO2 27.1 03/29/2022    CALCIUM 9.7 03/29/2022    PROTEINTOT 7.1 03/29/2022     ALBUMIN 4.90 03/29/2022    BILITOT 0.5 03/29/2022    ALKPHOS 50 03/29/2022    AST 22 03/29/2022    ALT 30 03/29/2022          Assessment and Plan    Diagnoses and all orders for this visit:    1. Hairy cell leukemia not having achieved remission (HCC) (Primary)  -     CBC & Differential; Future  -     Comprehensive Metabolic Panel; Future  -     Lactate Dehydrogenase; Future    2. Neutropenia, unspecified type (HCC)    3. Other fatigue    4. Class 2 obesity due to excess calories without serious comorbidity with body mass index (BMI) of 39.0 to 39.9 in adult          Patient was given instructions and counseling regarding his condition or for health maintenance advice. Please see specific information pulled into the AVS if appropriate.     Hairy cell leukemia: This is an indolent form of leukemia which requires treatment if the patient develops worsening cytopenias, secondary infections, or symptomatic splenomegaly.  He does not have evidence of any of these findings so I would not recommend treatment at this time.  The patient would like treatment due to severe fatigue but is unsure if it is worth the risk. The treatment is clardrabine 0.14mg/kg/day for 5 days, one cycle only.  This is associated with severe cytopenias, risk of infection, and fatigue itself until the patient recovers.     Neutropenia and thrombocytopenia: Secondary to his underlying disease.  Both have improved slightly.  He will follow-up with me in 1 month with repeat CBC.    Immune deficiency: Patient has mildly reduced IgG and IgM levels secondary to his underlying disease.  However, he has not had any significant infections.  We will continue to monitor.    Severe Fatigue:  Likely related to his diagnosis of Riki Cell Leukemia given the timing of severe onset.  However, this could be complicated by stress, anxiety, or sleep difficulty.  I recommend increased exercise and weight loss. His current BMI is 39.      I spent 45 minutes caring for  Dung on this date of service. This time includes time spent by me in the following activities: preparing for the visit, reviewing tests, obtaining and/or reviewing a separately obtained history, performing a medically appropriate examination and/or evaluation, counseling and educating the patient/family/caregiver, ordering medications, tests, or procedures and documenting information in the medical record

## 2022-05-27 NOTE — TELEPHONE ENCOUNTER
Caller: Dung Burch    Relationship: Self    Best call back number: [phone]    Who are you requesting to speak with (clinical staff, provider,  specific staff member): DR ALCALA    What was the call regarding: PATIENT WOULD LIKE A CALL FROM DR ALCALA. HE HAS SOME QUESTIONS REGARDING RESULTS AND HIS LEUKEMIA. PLEASE CALL PATIENT TO ADVISE.

## 2022-05-30 PROBLEM — R53.83 OTHER FATIGUE: Status: ACTIVE | Noted: 2022-05-30

## 2022-05-31 NOTE — TELEPHONE ENCOUNTER
I spoke with patient at length about diagnosis of hairy cell leukemia, patient's concerns about treatment options, he is wanting to get a second opinion, will make referral and he is going to call physician in [place] also, he will let me know if he does not hear something from the call center about referrals

## 2022-06-20 RX ORDER — CITALOPRAM 20 MG/1
20 TABLET ORAL DAILY
Qty: 90 TABLET | Refills: 1 | Status: SHIPPED | OUTPATIENT
Start: 2022-06-20 | End: 2022-09-27 | Stop reason: SDUPTHER

## 2022-07-09 ENCOUNTER — HOSPITAL ENCOUNTER (EMERGENCY)
Facility: HOSPITAL | Age: 67
Discharge: HOME | End: 2022-07-09
Attending: EMERGENCY MEDICINE | Admitting: EMERGENCY MEDICINE

## 2022-07-09 ENCOUNTER — APPOINTMENT (OUTPATIENT)
Dept: GENERAL RADIOLOGY | Facility: HOSPITAL | Age: 67
End: 2022-07-09

## 2022-07-09 ENCOUNTER — APPOINTMENT (OUTPATIENT)
Dept: CT IMAGING | Facility: HOSPITAL | Age: 67
End: 2022-07-09

## 2022-07-09 VITALS
BODY MASS INDEX: 38.16 KG/M2 | OXYGEN SATURATION: 98 % | RESPIRATION RATE: 16 BRPM | WEIGHT: 237.44 LBS | HEIGHT: 66 IN | SYSTOLIC BLOOD PRESSURE: 133 MMHG | DIASTOLIC BLOOD PRESSURE: 75 MMHG | HEART RATE: 88 BPM | TEMPERATURE: 98.5 F

## 2022-07-09 DIAGNOSIS — J18.9 PNEUMONIA OF LEFT LOWER LOBE DUE TO INFECTIOUS ORGANISM: Primary | ICD-10-CM

## 2022-07-09 LAB
ALBUMIN SERPL-MCNC: 4.6 G/DL (ref 3.5–5.2)
ALBUMIN/GLOB SERPL: 1.7 G/DL
ALP SERPL-CCNC: 66 U/L (ref 39–117)
ALT SERPL W P-5'-P-CCNC: 18 U/L (ref 1–41)
ANION GAP SERPL CALCULATED.3IONS-SCNC: 10 MMOL/L (ref 5–15)
AST SERPL-CCNC: 15 U/L (ref 1–40)
BASOPHILS # BLD AUTO: 0.03 10*3/MM3 (ref 0–0.2)
BASOPHILS NFR BLD AUTO: 0.7 % (ref 0–1.5)
BILIRUB SERPL-MCNC: 0.9 MG/DL (ref 0–1.2)
BILIRUB UR QL STRIP: NEGATIVE
BUN SERPL-MCNC: 11 MG/DL (ref 8–23)
BUN/CREAT SERPL: 9.5 (ref 7–25)
CALCIUM SPEC-SCNC: 9.7 MG/DL (ref 8.6–10.5)
CHLORIDE SERPL-SCNC: 104 MMOL/L (ref 98–107)
CLARITY UR: CLEAR
CO2 SERPL-SCNC: 26 MMOL/L (ref 22–29)
COLOR UR: YELLOW
CREAT SERPL-MCNC: 1.16 MG/DL (ref 0.76–1.27)
D-LACTATE SERPL-SCNC: 1 MMOL/L (ref 0.5–2)
DEPRECATED RDW RBC AUTO: 44.6 FL (ref 37–54)
EGFRCR SERPLBLD CKD-EPI 2021: 69.5 ML/MIN/1.73
EOSINOPHIL # BLD AUTO: 0.01 10*3/MM3 (ref 0–0.4)
EOSINOPHIL NFR BLD AUTO: 0.2 % (ref 0.3–6.2)
ERYTHROCYTE [DISTWIDTH] IN BLOOD BY AUTOMATED COUNT: 13 % (ref 12.3–15.4)
GLOBULIN UR ELPH-MCNC: 2.7 GM/DL
GLUCOSE SERPL-MCNC: 111 MG/DL (ref 65–99)
GLUCOSE UR STRIP-MCNC: NEGATIVE MG/DL
HCT VFR BLD AUTO: 38.3 % (ref 37.5–51)
HGB BLD-MCNC: 13.5 G/DL (ref 13–17.7)
HGB UR QL STRIP.AUTO: NEGATIVE
HOLD SPECIMEN: NORMAL
HOLD SPECIMEN: NORMAL
IMM GRANULOCYTES # BLD AUTO: 0.03 10*3/MM3 (ref 0–0.05)
IMM GRANULOCYTES NFR BLD AUTO: 0.7 % (ref 0–0.5)
KETONES UR QL STRIP: NEGATIVE
LEUKOCYTE ESTERASE UR QL STRIP.AUTO: NEGATIVE
LYMPHOCYTES # BLD AUTO: 0.21 10*3/MM3 (ref 0.7–3.1)
LYMPHOCYTES NFR BLD AUTO: 4.7 % (ref 19.6–45.3)
MCH RBC QN AUTO: 33.3 PG (ref 26.6–33)
MCHC RBC AUTO-ENTMCNC: 35.2 G/DL (ref 31.5–35.7)
MCV RBC AUTO: 94.6 FL (ref 79–97)
MONOCYTES # BLD AUTO: 0.77 10*3/MM3 (ref 0.1–0.9)
MONOCYTES NFR BLD AUTO: 17.1 % (ref 5–12)
NEUTROPHILS NFR BLD AUTO: 3.46 10*3/MM3 (ref 1.7–7)
NEUTROPHILS NFR BLD AUTO: 76.6 % (ref 42.7–76)
NITRITE UR QL STRIP: NEGATIVE
NRBC BLD AUTO-RTO: 0 /100 WBC (ref 0–0.2)
PH UR STRIP.AUTO: 6 [PH] (ref 5–8)
PLATELET # BLD AUTO: 143 10*3/MM3 (ref 140–450)
PMV BLD AUTO: 9.1 FL (ref 6–12)
POTASSIUM SERPL-SCNC: 4.7 MMOL/L (ref 3.5–5.2)
PROT SERPL-MCNC: 7.3 G/DL (ref 6–8.5)
PROT UR QL STRIP: NEGATIVE
RBC # BLD AUTO: 4.05 10*6/MM3 (ref 4.14–5.8)
SODIUM SERPL-SCNC: 140 MMOL/L (ref 136–145)
SP GR UR STRIP: 1.01 (ref 1–1.03)
UROBILINOGEN UR QL STRIP: NORMAL
WBC NRBC COR # BLD: 4.51 10*3/MM3 (ref 3.4–10.8)
WHOLE BLOOD HOLD COAG: NORMAL
WHOLE BLOOD HOLD SPECIMEN: NORMAL

## 2022-07-09 PROCEDURE — 83605 ASSAY OF LACTIC ACID: CPT | Performed by: EMERGENCY MEDICINE

## 2022-07-09 PROCEDURE — 36415 COLL VENOUS BLD VENIPUNCTURE: CPT

## 2022-07-09 PROCEDURE — 96375 TX/PRO/DX INJ NEW DRUG ADDON: CPT

## 2022-07-09 PROCEDURE — 71045 X-RAY EXAM CHEST 1 VIEW: CPT

## 2022-07-09 PROCEDURE — U0004 COV-19 TEST NON-CDC HGH THRU: HCPCS | Performed by: EMERGENCY MEDICINE

## 2022-07-09 PROCEDURE — 25010000002 LEVOFLOXACIN PER 250 MG: Performed by: EMERGENCY MEDICINE

## 2022-07-09 PROCEDURE — 96365 THER/PROPH/DIAG IV INF INIT: CPT

## 2022-07-09 PROCEDURE — 96366 THER/PROPH/DIAG IV INF ADDON: CPT

## 2022-07-09 PROCEDURE — C9803 HOPD COVID-19 SPEC COLLECT: HCPCS | Performed by: EMERGENCY MEDICINE

## 2022-07-09 PROCEDURE — 25010000002 KETOROLAC TROMETHAMINE PER 15 MG: Performed by: EMERGENCY MEDICINE

## 2022-07-09 PROCEDURE — 81003 URINALYSIS AUTO W/O SCOPE: CPT

## 2022-07-09 PROCEDURE — 80053 COMPREHEN METABOLIC PANEL: CPT

## 2022-07-09 PROCEDURE — 99283 EMERGENCY DEPT VISIT LOW MDM: CPT

## 2022-07-09 PROCEDURE — 99284 EMERGENCY DEPT VISIT MOD MDM: CPT

## 2022-07-09 PROCEDURE — 87040 BLOOD CULTURE FOR BACTERIA: CPT | Performed by: EMERGENCY MEDICINE

## 2022-07-09 PROCEDURE — 85025 COMPLETE CBC W/AUTO DIFF WBC: CPT

## 2022-07-09 PROCEDURE — 70450 CT HEAD/BRAIN W/O DYE: CPT

## 2022-07-09 RX ORDER — SODIUM CHLORIDE 0.9 % (FLUSH) 0.9 %
10 SYRINGE (ML) INJECTION AS NEEDED
Status: DISCONTINUED | OUTPATIENT
Start: 2022-07-09 | End: 2022-07-09 | Stop reason: HOSPADM

## 2022-07-09 RX ORDER — LEVOFLOXACIN 750 MG/1
750 TABLET ORAL DAILY
Qty: 5 TABLET | Refills: 0 | Status: SHIPPED | OUTPATIENT
Start: 2022-07-09 | End: 2022-07-09 | Stop reason: SDUPTHER

## 2022-07-09 RX ORDER — LEVOFLOXACIN 5 MG/ML
750 INJECTION, SOLUTION INTRAVENOUS ONCE
Status: COMPLETED | OUTPATIENT
Start: 2022-07-09 | End: 2022-07-09

## 2022-07-09 RX ORDER — LEVOFLOXACIN 750 MG/1
750 TABLET ORAL DAILY
Qty: 5 TABLET | Refills: 0 | Status: SHIPPED | OUTPATIENT
Start: 2022-07-09 | End: 2022-07-15

## 2022-07-09 RX ORDER — KETOROLAC TROMETHAMINE 15 MG/ML
15 INJECTION, SOLUTION INTRAMUSCULAR; INTRAVENOUS ONCE
Status: COMPLETED | OUTPATIENT
Start: 2022-07-09 | End: 2022-07-09

## 2022-07-09 RX ADMIN — KETOROLAC TROMETHAMINE 15 MG: 15 INJECTION, SOLUTION INTRAMUSCULAR; INTRAVENOUS at 15:55

## 2022-07-09 RX ADMIN — LEVOFLOXACIN 750 MG: 750 INJECTION, SOLUTION INTRAVENOUS at 17:58

## 2022-07-09 NOTE — DISCHARGE INSTRUCTIONS
Rest at home.  Drink plenty of fluids.  Follow-up with your COVID-19 test using the [employer] kim.  Self isolate/quarantine until you get your COVID-19 test results back.  Take antibiotics as prescribed.  Return to the ER for change or worsening symptoms, increasing shortness of breath, persistent fever, or any other concerns issues that may arise.

## 2022-07-09 NOTE — ED PROVIDER NOTES
Time: 2:07 PM EDT  Arrived by: private car  Chief Complaint: headache, back pain  History provided by: patient  History is limited by: N/A     History of Present Illness:  Patient is a 66 y.o.  male that presents to the emergency department with left-sided headache, bilateral neck pain, chills and back pain with onset 3 days ago. Pt stated that the headache started first. Pt is currently on chemo for hairy cell leukemia. Pt states that his temp has been around ~99.9F for past few days. Pt denies any cough, vomiting or burning with urination. Pt's spouse states that Pt's WBC has increased over the past week.     HPI    Patient Care Team  Primary Care Provider: Isaac Matta MD    Past Medical History:     No Known Allergies  Past Medical History:   Diagnosis Date   • Cervical root disorders, not elsewhere classified    • Essential (primary) hypertension    • ETOH abuse    • Hairy cell leukemia (HCC)    • High cholesterol    • Impaired fasting glucose    • Pure hypercholesterolemia    • Sleep apnea, unspecified    • Transient cerebral ischemic attack, unspecified    • Vitamin D deficiency, unspecified      Past Surgical History:   Procedure Laterality Date   • CHOLECYSTECTOMY  08/2019   • ENDOSCOPY AND COLONOSCOPY  2008   • KNEE SURGERY Right    • OTHER SURGICAL HISTORY      TUBAL ADENOMA   • OTHER SURGICAL HISTORY Left     CHEEK   • THYROID CYST EXCISION  2000     Family History   Problem Relation Age of Onset   • Cancer Mother    • Cancer Father    • Hyperlipidemia Father    • COPD Father    • Cancer Paternal Grandfather        Home Medications:  Prior to Admission medications    Medication Sig Start Date End Date Taking? Authorizing Provider   aspirin 81 MG chewable tablet aspirin 81 mg oral tablet,chewable chew 1 tablet (81 mg) by oral route once daily   Active    ProviderSaqib MD   cholecalciferol (VITAMIN D3) 25 MCG (1000 UT) tablet     ProviderSaqib MD   citalopram (CeleXA) 20 MG  "tablet Take 1 tablet by mouth Daily. 6/20/22   Isaac Matta MD   loratadine (CLARITIN) 10 MG tablet loratadine 10 mg oral tablet take 1 tablet (10 mg) by oral route once daily   Active    Provider, MD Saqib   oxybutynin XL (DITROPAN-XL) 10 MG 24 hr tablet oxybutynin chloride 10 mg oral tablet extended release 24hr take 1 tablet (10 mg) by oral route once daily for 90 days   Active    Provider, MD Saqib   quinapril (ACCUPRIL) 40 MG tablet Take 1 tablet by mouth Every 12 (Twelve) Hours. 1/5/22   Isaac Matta MD   rosuvastatin (CRESTOR) 10 MG tablet Take 1 tablet by mouth Daily. 1/5/22   Isaac Matta MD   spironolactone (ALDACTONE) 25 MG tablet Take 1 tablet by mouth Daily. 1/5/22   Isaac Matta MD        Social History:   Social History     Tobacco Use   • Smoking status: Never Smoker   • Smokeless tobacco: Never Used   Vaping Use   • Vaping Use: Never used   Substance Use Topics   • Alcohol use: Yes     Alcohol/week: 3.0 standard drinks     Types: 3 Cans of beer per week     Comment: 3 per day   • Drug use: Never       Review of Systems:  Review of Systems   Constitutional: Positive for chills. Negative for fever.   HENT: Negative for congestion, ear pain and sore throat.    Eyes: Negative for pain.   Respiratory: Negative for cough, chest tightness and shortness of breath.    Cardiovascular: Negative for chest pain.   Gastrointestinal: Negative for abdominal pain, diarrhea, nausea and vomiting.   Genitourinary: Negative for flank pain and hematuria.   Musculoskeletal: Positive for back pain and neck pain. Negative for joint swelling.   Skin: Negative for pallor.   Neurological: Positive for headaches. Negative for seizures.   All other systems reviewed and are negative.         Physical Exam:  /75 (BP Location: Left arm, Patient Position: Sitting)   Pulse 88   Temp 98.5 °F (36.9 °C) (Oral)   Resp 16   Ht 167.6 cm (66\")   Wt 108 kg (237 lb 7 oz)   " SpO2 98%   BMI 38.32 kg/m²     Physical Exam  Vitals and nursing note reviewed.   Constitutional:       General: He is not in acute distress.     Appearance: Normal appearance. He is not toxic-appearing.   HENT:      Head: Normocephalic and atraumatic.      Mouth/Throat:      Mouth: Mucous membranes are moist.   Eyes:      General: No scleral icterus.  Neck:      Comments:   no nuchal rigidity  Cardiovascular:      Rate and Rhythm: Normal rate and regular rhythm.      Pulses: Normal pulses.      Heart sounds: Normal heart sounds.   Pulmonary:      Effort: Pulmonary effort is normal. No respiratory distress.      Breath sounds: Normal breath sounds.   Abdominal:      General: Abdomen is flat.      Palpations: Abdomen is soft.      Tenderness: There is no abdominal tenderness.   Musculoskeletal:         General: Normal range of motion.      Cervical back: Normal range of motion and neck supple.   Skin:     General: Skin is warm and dry.   Neurological:      Mental Status: He is alert and oriented to person, place, and time. Mental status is at baseline.                Medications in the Emergency Department:  Medications   sodium chloride 0.9 % flush 10 mL (has no administration in time range)   levoFLOXacin (LEVAQUIN) 750 mg/150 mL D5W (premix) (LEVAQUIN) 750 mg (has no administration in time range)   ketorolac (TORADOL) injection 15 mg (15 mg Intravenous Given 7/9/22 1555)        Labs  Lab Results (last 24 hours)     Procedure Component Value Units Date/Time    CBC & Differential [[id number]]  (Abnormal) Collected: 07/09/22 1119    Specimen: Blood Updated: 07/09/22 1126    Narrative:      The following orders were created for panel order CBC & Differential.  Procedure                               Abnormality         Status                     ---------                               -----------         ------                     CBC Auto Differential[[id number]]        Abnormal            Final result                  Please view results for these tests on the individual orders.    Comprehensive Metabolic Panel [[id number]]  (Abnormal) Collected: 07/09/22 1119    Specimen: Blood Updated: 07/09/22 1151     Glucose 111 mg/dL      BUN 11 mg/dL      Creatinine 1.16 mg/dL      Sodium 140 mmol/L      Potassium 4.7 mmol/L      Chloride 104 mmol/L      CO2 26.0 mmol/L      Calcium 9.7 mg/dL      Total Protein 7.3 g/dL      Albumin 4.60 g/dL      ALT (SGPT) 18 U/L      AST (SGOT) 15 U/L      Alkaline Phosphatase 66 U/L      Total Bilirubin 0.9 mg/dL      Globulin 2.7 gm/dL      A/G Ratio 1.7 g/dL      BUN/Creatinine Ratio 9.5     Anion Gap 10.0 mmol/L      eGFR 69.5 mL/min/1.73      Comment: National Kidney Foundation and American Society of Nephrology (ASN) Task Force recommended calculation based on the Chronic Kidney Disease Epidemiology Collaboration (CKD-EPI) equation refit without adjustment for race.       Narrative:      GFR Normal >60  Chronic Kidney Disease <60  Kidney Failure <15      CBC Auto Differential [[id number]]  (Abnormal) Collected: 07/09/22 1119    Specimen: Blood Updated: 07/09/22 1126     WBC 4.51 10*3/mm3      RBC 4.05 10*6/mm3      Hemoglobin 13.5 g/dL      Hematocrit 38.3 %      MCV 94.6 fL      MCH 33.3 pg      MCHC 35.2 g/dL      RDW 13.0 %      RDW-SD 44.6 fl      MPV 9.1 fL      Platelets 143 10*3/mm3      Neutrophil % 76.6 %      Lymphocyte % 4.7 %      Monocyte % 17.1 %      Eosinophil % 0.2 %      Basophil % 0.7 %      Immature Grans % 0.7 %      Neutrophils, Absolute 3.46 10*3/mm3      Lymphocytes, Absolute 0.21 10*3/mm3      Monocytes, Absolute 0.77 10*3/mm3      Eosinophils, Absolute 0.01 10*3/mm3      Basophils, Absolute 0.03 10*3/mm3      Immature Grans, Absolute 0.03 10*3/mm3      nRBC 0.0 /100 WBC     Urinalysis With Microscopic If Indicated (No Culture) - Urine, Clean Catch [[id number]]  (Normal) Collected: 07/09/22 1304    Specimen: Urine, Clean Catch Updated: 07/09/22 1318     Color, UA Yellow      Appearance, UA Clear     pH, UA 6.0     Specific Gravity, UA 1.012     Glucose, UA Negative     Ketones, UA Negative     Bilirubin, UA Negative     Blood, UA Negative     Protein, UA Negative     Leuk Esterase, UA Negative     Nitrite, UA Negative     Urobilinogen, UA 0.2 E.U./dL    Narrative:      Urine microscopic not indicated.    Lactic Acid, Plasma [[id number]]  (Normal) Collected: 07/09/22 1558    Specimen: Blood Updated: 07/09/22 1645     Lactate 1.0 mmol/L     Blood Culture - Blood, Arm, Left [[id number]] Collected: 07/09/22 1558    Specimen: Blood from Arm, Left Updated: 07/09/22 1610    Blood Culture - Blood, Arm, Left [[id number]] Collected: 07/09/22 1558    Specimen: Blood from Arm, Left Updated: 07/09/22 1610           Imaging:  CT Head Without Contrast    Result Date: 7/9/2022  PROCEDURE: CT HEAD WO CONTRAST  COMPARISON:  [facility], CT, HEAD W/WO CONTRAST, 6/06/2018, 11:38.  INDICATIONS: Headache  PROTOCOL:   Standard imaging protocol performed    RADIATION:   DLP: 1017.2 mGy*cm   MA and/or KV was adjusted to minimize radiation dose.    TECHNIQUE: CT images were obtained without non-ionic intravenous contrast material.  FINDINGS:  The ventricles are normal in size, position, and configuration.  Sulci are not abnormally prominent.  No abnormal gray or white matter density is appreciated.  There is no CT evidence of acute intracranial hemorrhage, mass, or mass effect.  The orbits have a normal appearance.  The paranasal sinuses are well aerated.  A small air-fluid level is seen in the left sphenoid sinus.  The middle ears and mastoid air cells are well aerated.  No fractures are seen on bone window images.       Negative CT scan of the head without IV contrast.     MAO COLON MD       Electronically Signed and Approved By: MAO COLON MD on 7/09/2022 at 16:57             XR Chest 1 View    Result Date: 7/9/2022  PROCEDURE: XR CHEST 1 VW  COMPARISON: [facility]  \[facility]", CR, CHEST AP/PA 1 VIEW, 3/27/2013, 10:43.  INDICATIONS: BODY ACHES, POSTERIOR NECK PAIN WITH FLEXION, WEAKNESS X 3 DAYS  FINDINGS:  The heart is normal in size.  Low lung volumes are evident, with mild patchy airspace disease in the left lower lobe.  No dense consolidation or mass is evident.  Bony structures appear intact.        Low lung volumes with patchy airspace disease in the left lower lobe.       MAO COLON MD       Electronically Signed and Approved By: MAO COLON MD on 7/09/2022 at 11:52                EKG:      Procedures:  Procedures    Progress                      The patient was seen and evaluated the ED by me.  The above history and physical examination was performed as document.  The diagnostic data was obtained peer results reviewed.  Patient's initial work-up was found that he has a left lower lobe infiltrate.  Patient will be started on oral antibiotics.  Patient be discharged home with this.  Prior to discharge I did discuss the possibility of doing a lumbar puncture to rule out meningitis.  Risk benefits were discussed at length with the patient and his wife.  After lengthy discussion the patient declined to have the lumbar puncture performed at this time.  Patient was invited to return to the ER if he develops any worsening symptoms including persistent or worsening headache and/or development of a stiff neck.  Patient is advised to follow-up with his PCP this coming week.      Medical Decision Making:  MDM     Final diagnoses:   Pneumonia of left lower lobe due to infectious organism        Disposition:  ED Disposition     ED Disposition   Discharge    Condition   Stable    Comment   --              Anthony Roman  07/09/22 5814       Randall Brower DO  07/09/22 3042

## 2022-07-10 LAB — SARS-COV-2 RNA PNL SPEC NAA+PROBE: NOT DETECTED

## 2022-07-13 ENCOUNTER — TELEPHONE (OUTPATIENT)
Dept: INTERNAL MEDICINE | Facility: CLINIC | Age: 67
End: 2022-07-13

## 2022-07-13 NOTE — TELEPHONE ENCOUNTER
Caller: WOODY WAGNER    Relationship: Emergency Contact    Best call back number: [phone] /[phone]    What is the best time to reach you: ANYTIME    Who are you requesting to speak with (clinical staff, provider,  specific staff member): CLINICAL    What was the call regarding: THE PATIENT'S SPOUSE STATED THE PATIENT IS HAVING A REACTION TO HIS LEVAQUIN AND WOULD LIKE A CALL BACK TO DISCUSS. HE IS HAVING JOINT PAIN, STIFFNESS, HEADACHE    Do you require a callback: YES

## 2022-07-14 LAB
BACTERIA SPEC AEROBE CULT: NORMAL
BACTERIA SPEC AEROBE CULT: NORMAL

## 2022-07-15 ENCOUNTER — LAB (OUTPATIENT)
Dept: LAB | Facility: HOSPITAL | Age: 67
End: 2022-07-15

## 2022-07-15 ENCOUNTER — OFFICE VISIT (OUTPATIENT)
Dept: INTERNAL MEDICINE | Facility: CLINIC | Age: 67
End: 2022-07-15

## 2022-07-15 VITALS
OXYGEN SATURATION: 95 % | TEMPERATURE: 98.1 F | WEIGHT: 234.2 LBS | BODY MASS INDEX: 37.64 KG/M2 | HEIGHT: 66 IN | HEART RATE: 76 BPM | SYSTOLIC BLOOD PRESSURE: 145 MMHG | DIASTOLIC BLOOD PRESSURE: 92 MMHG

## 2022-07-15 DIAGNOSIS — C91.40 HAIRY CELL LEUKEMIA NOT HAVING ACHIEVED REMISSION: ICD-10-CM

## 2022-07-15 DIAGNOSIS — I10 HYPERTENSION, ESSENTIAL: ICD-10-CM

## 2022-07-15 DIAGNOSIS — C91.40 HAIRY CELL LEUKEMIA NOT HAVING ACHIEVED REMISSION: Primary | ICD-10-CM

## 2022-07-15 DIAGNOSIS — R51.9 BILATERAL HEADACHES: ICD-10-CM

## 2022-07-15 DIAGNOSIS — D72.819 LEUKOPENIA, UNSPECIFIED TYPE: ICD-10-CM

## 2022-07-15 LAB
ALBUMIN SERPL-MCNC: 4.5 G/DL (ref 3.5–5.2)
ALBUMIN/GLOB SERPL: 1.6 G/DL
ALP SERPL-CCNC: 71 U/L (ref 39–117)
ALT SERPL W P-5'-P-CCNC: 16 U/L (ref 1–41)
ANION GAP SERPL CALCULATED.3IONS-SCNC: 13.4 MMOL/L (ref 5–15)
AST SERPL-CCNC: 18 U/L (ref 1–40)
BASOPHILS # BLD AUTO: 0.05 10*3/MM3 (ref 0–0.2)
BASOPHILS NFR BLD AUTO: 1 % (ref 0–1.5)
BILIRUB SERPL-MCNC: 0.4 MG/DL (ref 0–1.2)
BUN SERPL-MCNC: 13 MG/DL (ref 8–23)
BUN/CREAT SERPL: 11.5 (ref 7–25)
CALCIUM SPEC-SCNC: 9.6 MG/DL (ref 8.6–10.5)
CHLORIDE SERPL-SCNC: 101 MMOL/L (ref 98–107)
CO2 SERPL-SCNC: 24.6 MMOL/L (ref 22–29)
CREAT SERPL-MCNC: 1.13 MG/DL (ref 0.76–1.27)
CRP SERPL-MCNC: 3.19 MG/DL (ref 0–0.5)
DEPRECATED RDW RBC AUTO: 40.1 FL (ref 37–54)
EGFRCR SERPLBLD CKD-EPI 2021: 71.7 ML/MIN/1.73
EOSINOPHIL # BLD AUTO: 0.04 10*3/MM3 (ref 0–0.4)
EOSINOPHIL NFR BLD AUTO: 0.8 % (ref 0.3–6.2)
ERYTHROCYTE [DISTWIDTH] IN BLOOD BY AUTOMATED COUNT: 12.4 % (ref 12.3–15.4)
ERYTHROCYTE [SEDIMENTATION RATE] IN BLOOD: 52 MM/HR (ref 0–20)
GLOBULIN UR ELPH-MCNC: 2.8 GM/DL
GLUCOSE SERPL-MCNC: 104 MG/DL (ref 65–99)
HCT VFR BLD AUTO: 37.3 % (ref 37.5–51)
HGB BLD-MCNC: 13.4 G/DL (ref 13–17.7)
IMM GRANULOCYTES # BLD AUTO: 0.19 10*3/MM3 (ref 0–0.05)
IMM GRANULOCYTES NFR BLD AUTO: 3.9 % (ref 0–0.5)
LYMPHOCYTES # BLD AUTO: 0.3 10*3/MM3 (ref 0.7–3.1)
LYMPHOCYTES NFR BLD AUTO: 6.1 % (ref 19.6–45.3)
MCH RBC QN AUTO: 32.8 PG (ref 26.6–33)
MCHC RBC AUTO-ENTMCNC: 35.9 G/DL (ref 31.5–35.7)
MCV RBC AUTO: 91.4 FL (ref 79–97)
MONOCYTES # BLD AUTO: 0.92 10*3/MM3 (ref 0.1–0.9)
MONOCYTES NFR BLD AUTO: 18.8 % (ref 5–12)
NEUTROPHILS NFR BLD AUTO: 3.4 10*3/MM3 (ref 1.7–7)
NEUTROPHILS NFR BLD AUTO: 69.4 % (ref 42.7–76)
NRBC BLD AUTO-RTO: 0 /100 WBC (ref 0–0.2)
PLATELET # BLD AUTO: 196 10*3/MM3 (ref 140–450)
PMV BLD AUTO: 9.4 FL (ref 6–12)
POTASSIUM SERPL-SCNC: 4.6 MMOL/L (ref 3.5–5.2)
PROT SERPL-MCNC: 7.3 G/DL (ref 6–8.5)
RBC # BLD AUTO: 4.08 10*6/MM3 (ref 4.14–5.8)
SODIUM SERPL-SCNC: 139 MMOL/L (ref 136–145)
WBC NRBC COR # BLD: 4.9 10*3/MM3 (ref 3.4–10.8)

## 2022-07-15 PROCEDURE — 85652 RBC SED RATE AUTOMATED: CPT

## 2022-07-15 PROCEDURE — 99214 OFFICE O/P EST MOD 30 MIN: CPT | Performed by: INTERNAL MEDICINE

## 2022-07-15 PROCEDURE — 85025 COMPLETE CBC W/AUTO DIFF WBC: CPT

## 2022-07-15 PROCEDURE — 80053 COMPREHEN METABOLIC PANEL: CPT

## 2022-07-15 PROCEDURE — 86140 C-REACTIVE PROTEIN: CPT

## 2022-07-15 PROCEDURE — 36415 COLL VENOUS BLD VENIPUNCTURE: CPT

## 2022-07-15 NOTE — PROGRESS NOTES
"Chief Complaint/ HPI: Okay this ER follow-up, for neck pain possible pneumonia, was in the emergency room all day, was sent home on Levaquin but he stopped that 2 days ago July 13, 2022 due to joint pains muscle pains, he was told he had pneumonia but not meningitis,    Lab work, chest x-ray CT scan blood cultures are reviewed July 15, 2022    Still with headache back of the head,      Objective   Vital Signs  Vitals:    07/15/22 0926   BP: 145/92   Pulse: 76   Temp: 98.1 °F (36.7 °C)   SpO2: 95%   Weight: 106 kg (234 lb 3.2 oz)   Height: 167.6 cm (65.98\")      Body mass index is 37.82 kg/m².  Review of Systems no nausea vomiting or diarrhea, some abdominal pains, back pains,  Physical Exam  Constitutional:       General: He is not in acute distress.     Appearance: Normal appearance. He is obese.   HENT:      Head: Normocephalic.      Mouth/Throat:      Mouth: Mucous membranes are moist.   Eyes:      Conjunctiva/sclera: Conjunctivae normal.      Pupils: Pupils are equal, round, and reactive to light.   Cardiovascular:      Rate and Rhythm: Normal rate and regular rhythm.      Pulses: Normal pulses.      Heart sounds: Normal heart sounds.   Pulmonary:      Effort: Pulmonary effort is normal.      Breath sounds: Normal breath sounds.   Abdominal:      General: Bowel sounds are normal.      Palpations: Abdomen is soft.   Musculoskeletal:         General: No swelling. Normal range of motion.      Cervical back: Neck supple.   Skin:     General: Skin is warm and dry.      Coloration: Skin is not jaundiced.   Neurological:      General: No focal deficit present.      Mental Status: He is alert and oriented to person, place, and time. Mental status is at baseline.   Psychiatric:         Mood and Affect: Mood normal.         Behavior: Behavior normal.         Thought Content: Thought content normal.         Judgment: Judgment normal.        Result Review :   No results found for: PROBNP, BNP  CMP    CMP 9/14/21 3/29/22 " 7/9/22   Glucose 112 (A) 119 (A) 111 (A)   BUN 14 16 11   Creatinine 1.06 0.99 1.16   eGFR Non African Am 70     Sodium 136 140 140   Potassium 4.8 4.6 4.7   Chloride 99 104 104   Calcium 9.3 9.7 9.7   Albumin 4.70 4.90 4.60   Total Bilirubin 0.5 0.5 0.9   Alkaline Phosphatase 59 50 66   AST (SGOT) 23 22 15   ALT (SGPT) 29 30 18   (A) Abnormal value            CBC w/diff    CBC w/Diff 6/27/22 6/30/22 7/9/22   WBC 1.15 (A) 1.53 (A) 4.51   RBC 3.83 (A) 3.77 (A) 4.05 (A)   Hemoglobin 12.7 (A) 12.5 (A) 13.5   Hematocrit 36.9 (A) 36.5 (A) 38.3   MCV 96.3 96.8 94.6   MCH 33.2 33.2 33.3 (A)   MCHC 34.4 34.2 35.2   RDW 12.1 12.4 13.0   Platelets 143 171 143   Neutrophil Rel % 69.5 79.7 76.6 (A)   Immature Granulocyte Rel %   0.7 (A)   Lymphocyte Rel % 20.0 11.1 (A) 4.7 (A)   Monocyte Rel % 7.0 7.2 17.1 (A)   Eosinophil Rel % 2.6 1.3 0.2 (A)   Basophil Rel % 0.9 0.7 0.7   (A) Abnormal value             Lipid Panel    Lipid Panel 9/14/21   Total Cholesterol 135   Triglycerides 175 (A)   HDL Cholesterol 46   VLDL Cholesterol 29   LDL Cholesterol  60   LDL/HDL Ratio 1.17   (A) Abnormal value             Lab Results   Component Value Date    TSH 1.160 03/29/2022    TSH 1.810 12/29/2020    TSH 1.370 07/06/2020      Lab Results   Component Value Date    FREET4 1.29 03/29/2022    FREET4 1.2 12/29/2020    FREET4 1.1 07/06/2020      A1C Last 3 Results    HGBA1C Last 3 Results 9/14/21 3/29/22   Hemoglobin A1C 6.10 (A) 6.10 (A)   (A) Abnormal value             PSA    PSA 3/29/22   PSA 1.580                          Visit Diagnoses:    ICD-10-CM ICD-9-CM   1. Hairy cell leukemia not having achieved remission (HCC)  C91.40 202.40   2. Hypertension, essential  I10 401.9   3. Leukopenia, unspecified type  D72.819 288.50   4. Bilateral headaches  R51.9 784.0       Assessment and Plan   Diagnoses and all orders for this visit:    1. Hairy cell leukemia not having achieved remission (HCC) (Primary)    2. Hypertension, essential    3.  Leukopenia, unspecified type    4. Bilateral headaches        Headaches,, occipital neuralgia, treatment options discussed with muscle relaxers, doxepin, etc.--work-up in the ER including a CT of the brain did not show any acute findings LP was not performed, patient's clinical symptoms are improving daily as of July 15, discussed use of anti-inflammatories etc., repeat labs and sed rate, July 15    Left lower lobe pneumonia on chest x-ray July 9, 2022, was treated in the ER and sent home on Levaquin but not able to tolerate well due to joint pains, discussed other treatment options versus conservative management July 15, 2022    Hypertension okay to decrease quinapril to 1 a day 40 mg at bedtime, patient will continue on spironolactone 25 mg daily              Follow Up   No follow-ups on file.  Patient was given instructions and counseling regarding his condition or for health maintenance advice. Please see specific information pulled into the AVS if appropriate.

## 2022-07-18 ENCOUNTER — TELEPHONE (OUTPATIENT)
Dept: INTERNAL MEDICINE | Facility: CLINIC | Age: 67
End: 2022-07-18

## 2022-07-18 DIAGNOSIS — D72.819 LEUKOPENIA, UNSPECIFIED TYPE: ICD-10-CM

## 2022-07-18 DIAGNOSIS — N41.0 ACUTE PROSTATITIS: ICD-10-CM

## 2022-07-18 DIAGNOSIS — C91.40 HAIRY CELL LEUKEMIA NOT HAVING ACHIEVED REMISSION: Primary | ICD-10-CM

## 2022-07-18 DIAGNOSIS — R50.81 FEVER IN OTHER DISEASES: ICD-10-CM

## 2022-07-18 DIAGNOSIS — N40.1 BENIGN PROSTATIC HYPERPLASIA WITH LOWER URINARY TRACT SYMPTOMS, SYMPTOM DETAILS UNSPECIFIED: ICD-10-CM

## 2022-07-18 PROBLEM — R50.9 FEVER: Status: ACTIVE | Noted: 2022-07-18

## 2022-07-18 RX ORDER — DOXYCYCLINE HYCLATE 100 MG/1
100 CAPSULE ORAL 2 TIMES DAILY
Qty: 20 CAPSULE | Refills: 0 | Status: SHIPPED | OUTPATIENT
Start: 2022-07-18 | End: 2023-03-28

## 2022-07-18 NOTE — TELEPHONE ENCOUNTER
I called wife with labs and rec repeat labs and inflammatory markers --had been bit by tick also    Will do doxycycline as precaution---     Repeat cbc ,comp, sed rate, crp in one week, spoke with wife, will do lab work to include PSA, CBC, sed rate comp and CRP as precautionary in 1 week, [facility],

## 2022-07-25 ENCOUNTER — LAB (OUTPATIENT)
Dept: LAB | Facility: HOSPITAL | Age: 67
End: 2022-07-25

## 2022-07-25 DIAGNOSIS — N41.0 ACUTE PROSTATITIS: ICD-10-CM

## 2022-07-25 DIAGNOSIS — C91.40 HAIRY CELL LEUKEMIA NOT HAVING ACHIEVED REMISSION: ICD-10-CM

## 2022-07-25 DIAGNOSIS — N40.1 BENIGN PROSTATIC HYPERPLASIA WITH LOWER URINARY TRACT SYMPTOMS, SYMPTOM DETAILS UNSPECIFIED: ICD-10-CM

## 2022-07-25 DIAGNOSIS — D72.819 LEUKOPENIA, UNSPECIFIED TYPE: ICD-10-CM

## 2022-07-25 DIAGNOSIS — R50.81 FEVER IN OTHER DISEASES: ICD-10-CM

## 2022-07-25 LAB
ALBUMIN SERPL-MCNC: 4.3 G/DL (ref 3.5–5.2)
ALBUMIN/GLOB SERPL: 1.5 G/DL
ALP SERPL-CCNC: 70 U/L (ref 39–117)
ALT SERPL W P-5'-P-CCNC: 27 U/L (ref 1–41)
ANION GAP SERPL CALCULATED.3IONS-SCNC: 13.5 MMOL/L (ref 5–15)
AST SERPL-CCNC: 22 U/L (ref 1–40)
BASOPHILS # BLD AUTO: 0.06 10*3/MM3 (ref 0–0.2)
BASOPHILS NFR BLD AUTO: 1 % (ref 0–1.5)
BILIRUB SERPL-MCNC: 0.5 MG/DL (ref 0–1.2)
BUN SERPL-MCNC: 15 MG/DL (ref 8–23)
BUN/CREAT SERPL: 13.5 (ref 7–25)
CALCIUM SPEC-SCNC: 9.6 MG/DL (ref 8.6–10.5)
CHLORIDE SERPL-SCNC: 102 MMOL/L (ref 98–107)
CO2 SERPL-SCNC: 23.5 MMOL/L (ref 22–29)
CREAT SERPL-MCNC: 1.11 MG/DL (ref 0.76–1.27)
CRP SERPL-MCNC: <0.3 MG/DL (ref 0–0.5)
DEPRECATED RDW RBC AUTO: 41.8 FL (ref 37–54)
EGFRCR SERPLBLD CKD-EPI 2021: 72.8 ML/MIN/1.73
EOSINOPHIL # BLD AUTO: 0.1 10*3/MM3 (ref 0–0.4)
EOSINOPHIL NFR BLD AUTO: 1.6 % (ref 0.3–6.2)
ERYTHROCYTE [DISTWIDTH] IN BLOOD BY AUTOMATED COUNT: 12.7 % (ref 12.3–15.4)
ERYTHROCYTE [SEDIMENTATION RATE] IN BLOOD: 31 MM/HR (ref 0–20)
GLOBULIN UR ELPH-MCNC: 2.9 GM/DL
GLUCOSE SERPL-MCNC: 83 MG/DL (ref 65–99)
HCT VFR BLD AUTO: 38.6 % (ref 37.5–51)
HGB BLD-MCNC: 13.6 G/DL (ref 13–17.7)
IMM GRANULOCYTES # BLD AUTO: 0.12 10*3/MM3 (ref 0–0.05)
IMM GRANULOCYTES NFR BLD AUTO: 1.9 % (ref 0–0.5)
LYMPHOCYTES # BLD AUTO: 0.53 10*3/MM3 (ref 0.7–3.1)
LYMPHOCYTES NFR BLD AUTO: 8.6 % (ref 19.6–45.3)
MCH RBC QN AUTO: 32.4 PG (ref 26.6–33)
MCHC RBC AUTO-ENTMCNC: 35.2 G/DL (ref 31.5–35.7)
MCV RBC AUTO: 91.9 FL (ref 79–97)
MONOCYTES # BLD AUTO: 0.9 10*3/MM3 (ref 0.1–0.9)
MONOCYTES NFR BLD AUTO: 14.5 % (ref 5–12)
NEUTROPHILS NFR BLD AUTO: 4.48 10*3/MM3 (ref 1.7–7)
NEUTROPHILS NFR BLD AUTO: 72.4 % (ref 42.7–76)
NRBC BLD AUTO-RTO: 0 /100 WBC (ref 0–0.2)
PLATELET # BLD AUTO: 240 10*3/MM3 (ref 140–450)
PMV BLD AUTO: 9.7 FL (ref 6–12)
POTASSIUM SERPL-SCNC: 4.7 MMOL/L (ref 3.5–5.2)
PROT SERPL-MCNC: 7.2 G/DL (ref 6–8.5)
PSA SERPL-MCNC: 1.78 NG/ML (ref 0–4)
RBC # BLD AUTO: 4.2 10*6/MM3 (ref 4.14–5.8)
SODIUM SERPL-SCNC: 139 MMOL/L (ref 136–145)
WBC NRBC COR # BLD: 6.19 10*3/MM3 (ref 3.4–10.8)

## 2022-07-25 PROCEDURE — 36415 COLL VENOUS BLD VENIPUNCTURE: CPT

## 2022-07-25 PROCEDURE — 84153 ASSAY OF PSA TOTAL: CPT

## 2022-07-25 PROCEDURE — 80053 COMPREHEN METABOLIC PANEL: CPT

## 2022-07-25 PROCEDURE — 85652 RBC SED RATE AUTOMATED: CPT

## 2022-07-25 PROCEDURE — 86140 C-REACTIVE PROTEIN: CPT

## 2022-07-25 PROCEDURE — 85025 COMPLETE CBC W/AUTO DIFF WBC: CPT

## 2022-09-27 RX ORDER — CITALOPRAM 20 MG/1
20 TABLET ORAL DAILY
Qty: 90 TABLET | Refills: 1 | Status: SHIPPED | OUTPATIENT
Start: 2022-09-27 | End: 2023-03-22 | Stop reason: SDUPTHER

## 2022-09-29 ENCOUNTER — OFFICE VISIT (OUTPATIENT)
Dept: INTERNAL MEDICINE | Facility: CLINIC | Age: 67
End: 2022-09-29

## 2022-09-29 ENCOUNTER — LAB (OUTPATIENT)
Dept: LAB | Facility: HOSPITAL | Age: 67
End: 2022-09-29

## 2022-09-29 VITALS
OXYGEN SATURATION: 94 % | BODY MASS INDEX: 39.05 KG/M2 | HEIGHT: 66 IN | DIASTOLIC BLOOD PRESSURE: 81 MMHG | TEMPERATURE: 97.4 F | WEIGHT: 243 LBS | SYSTOLIC BLOOD PRESSURE: 125 MMHG | HEART RATE: 88 BPM

## 2022-09-29 DIAGNOSIS — K42.9 UMBILICAL HERNIA WITHOUT OBSTRUCTION AND WITHOUT GANGRENE: ICD-10-CM

## 2022-09-29 DIAGNOSIS — R04.0 BLEEDING NOSE: ICD-10-CM

## 2022-09-29 DIAGNOSIS — R53.83 OTHER FATIGUE: Primary | ICD-10-CM

## 2022-09-29 DIAGNOSIS — D72.819 LEUKOPENIA, UNSPECIFIED TYPE: ICD-10-CM

## 2022-09-29 DIAGNOSIS — N20.0 KIDNEY STONES: ICD-10-CM

## 2022-09-29 DIAGNOSIS — Z12.5 SCREENING PSA (PROSTATE SPECIFIC ANTIGEN): ICD-10-CM

## 2022-09-29 DIAGNOSIS — G45.9 TRANSIENT ISCHEMIC ATTACK: ICD-10-CM

## 2022-09-29 DIAGNOSIS — G47.33 OSA ON CPAP: ICD-10-CM

## 2022-09-29 DIAGNOSIS — I63.312 CEREBROVASCULAR ACCIDENT (CVA) DUE TO THROMBOSIS OF LEFT MIDDLE CEREBRAL ARTERY: ICD-10-CM

## 2022-09-29 DIAGNOSIS — E78.2 MIXED HYPERLIPIDEMIA: ICD-10-CM

## 2022-09-29 DIAGNOSIS — Z99.89 OSA ON CPAP: ICD-10-CM

## 2022-09-29 DIAGNOSIS — R73.01 IFG (IMPAIRED FASTING GLUCOSE): ICD-10-CM

## 2022-09-29 DIAGNOSIS — E55.9 VITAMIN D DEFICIENCY: ICD-10-CM

## 2022-09-29 DIAGNOSIS — N39.41 URGENCY INCONTINENCE: ICD-10-CM

## 2022-09-29 DIAGNOSIS — E66.09 CLASS 2 OBESITY DUE TO EXCESS CALORIES WITHOUT SERIOUS COMORBIDITY WITH BODY MASS INDEX (BMI) OF 39.0 TO 39.9 IN ADULT: ICD-10-CM

## 2022-09-29 DIAGNOSIS — I10 HYPERTENSION, ESSENTIAL: ICD-10-CM

## 2022-09-29 DIAGNOSIS — C91.40 HAIRY CELL LEUKEMIA NOT HAVING ACHIEVED REMISSION: ICD-10-CM

## 2022-09-29 DIAGNOSIS — R51.9 BILATERAL HEADACHES: ICD-10-CM

## 2022-09-29 LAB
ALBUMIN SERPL-MCNC: 4.3 G/DL (ref 3.5–5.2)
ALBUMIN/GLOB SERPL: 2 G/DL
ALP SERPL-CCNC: 55 U/L (ref 39–117)
ALT SERPL W P-5'-P-CCNC: 29 U/L (ref 1–41)
ANION GAP SERPL CALCULATED.3IONS-SCNC: 11.3 MMOL/L (ref 5–15)
AST SERPL-CCNC: 24 U/L (ref 1–40)
BASOPHILS # BLD AUTO: 0.04 10*3/MM3 (ref 0–0.2)
BASOPHILS NFR BLD AUTO: 0.8 % (ref 0–1.5)
BILIRUB SERPL-MCNC: 0.3 MG/DL (ref 0–1.2)
BUN SERPL-MCNC: 16 MG/DL (ref 8–23)
BUN/CREAT SERPL: 15.8 (ref 7–25)
CALCIUM SPEC-SCNC: 8.8 MG/DL (ref 8.6–10.5)
CHLORIDE SERPL-SCNC: 103 MMOL/L (ref 98–107)
CHOLEST SERPL-MCNC: 142 MG/DL (ref 0–200)
CO2 SERPL-SCNC: 22.7 MMOL/L (ref 22–29)
CREAT SERPL-MCNC: 1.01 MG/DL (ref 0.76–1.27)
DEPRECATED RDW RBC AUTO: 41.7 FL (ref 37–54)
EGFRCR SERPLBLD CKD-EPI 2021: 81.5 ML/MIN/1.73
EOSINOPHIL # BLD AUTO: 0.25 10*3/MM3 (ref 0–0.4)
EOSINOPHIL NFR BLD AUTO: 5.1 % (ref 0.3–6.2)
ERYTHROCYTE [DISTWIDTH] IN BLOOD BY AUTOMATED COUNT: 12.7 % (ref 12.3–15.4)
GLOBULIN UR ELPH-MCNC: 2.2 GM/DL
GLUCOSE SERPL-MCNC: 116 MG/DL (ref 65–99)
HCT VFR BLD AUTO: 38.4 % (ref 37.5–51)
HDLC SERPL-MCNC: 59 MG/DL (ref 40–60)
HGB BLD-MCNC: 13.7 G/DL (ref 13–17.7)
IMM GRANULOCYTES # BLD AUTO: 0.03 10*3/MM3 (ref 0–0.05)
IMM GRANULOCYTES NFR BLD AUTO: 0.6 % (ref 0–0.5)
LDLC SERPL CALC-MCNC: 53 MG/DL (ref 0–100)
LDLC/HDLC SERPL: 0.77 {RATIO}
LYMPHOCYTES # BLD AUTO: 0.46 10*3/MM3 (ref 0.7–3.1)
LYMPHOCYTES NFR BLD AUTO: 9.3 % (ref 19.6–45.3)
MCH RBC QN AUTO: 32 PG (ref 26.6–33)
MCHC RBC AUTO-ENTMCNC: 35.7 G/DL (ref 31.5–35.7)
MCV RBC AUTO: 89.7 FL (ref 79–97)
MONOCYTES # BLD AUTO: 0.55 10*3/MM3 (ref 0.1–0.9)
MONOCYTES NFR BLD AUTO: 11.1 % (ref 5–12)
NEUTROPHILS NFR BLD AUTO: 3.61 10*3/MM3 (ref 1.7–7)
NEUTROPHILS NFR BLD AUTO: 73.1 % (ref 42.7–76)
NRBC BLD AUTO-RTO: 0 /100 WBC (ref 0–0.2)
PLATELET # BLD AUTO: 182 10*3/MM3 (ref 140–450)
PMV BLD AUTO: 9.8 FL (ref 6–12)
POTASSIUM SERPL-SCNC: 4.6 MMOL/L (ref 3.5–5.2)
PROT SERPL-MCNC: 6.5 G/DL (ref 6–8.5)
RBC # BLD AUTO: 4.28 10*6/MM3 (ref 4.14–5.8)
SODIUM SERPL-SCNC: 137 MMOL/L (ref 136–145)
TRIGL SERPL-MCNC: 187 MG/DL (ref 0–150)
VLDLC SERPL-MCNC: 30 MG/DL (ref 5–40)
WBC NRBC COR # BLD: 4.94 10*3/MM3 (ref 3.4–10.8)

## 2022-09-29 PROCEDURE — 80061 LIPID PANEL: CPT

## 2022-09-29 PROCEDURE — 36415 COLL VENOUS BLD VENIPUNCTURE: CPT

## 2022-09-29 PROCEDURE — 85025 COMPLETE CBC W/AUTO DIFF WBC: CPT

## 2022-09-29 PROCEDURE — 80053 COMPREHEN METABOLIC PANEL: CPT

## 2022-09-29 PROCEDURE — 99214 OFFICE O/P EST MOD 30 MIN: CPT | Performed by: INTERNAL MEDICINE

## 2022-09-29 NOTE — PROGRESS NOTES
"Chief Complaint/ HPI: Stiff all over here to follow-up with his hairy cell leukemia, says he is seeing Dr. Addison in [place] no treatment currently being monitored on a regular basis, feels sore joints feel sore a lot especially in the mornings getting up, hands knees    Having some right abdominal right upper quadrant pain at times,      Objective   Vital Signs  Vitals:    09/29/22 1434   BP: 125/81   BP Location: Right arm   Patient Position: Sitting   Cuff Size: Large Adult   Pulse: 88   Temp: 97.4 °F (36.3 °C)   SpO2: 94%   Weight: 110 kg (243 lb)   Height: 167.6 cm (65.98\")      Body mass index is 39.24 kg/m².  Review of Systems   Constitutional: Positive for unexpected weight gain.   HENT: Negative.    Eyes: Negative.    Respiratory: Negative.    Cardiovascular: Negative.    Gastrointestinal: Negative.    Endocrine: Negative.    Genitourinary: Negative.    Musculoskeletal: Positive for arthralgias, myalgias, neck pain and neck stiffness.   Allergic/Immunologic: Negative.    Neurological: Negative.    Hematological: Negative.    Psychiatric/Behavioral: Negative.       Physical Exam  Constitutional:       General: He is not in acute distress.     Appearance: Normal appearance. He is obese.   HENT:      Head: Normocephalic.      Mouth/Throat:      Mouth: Mucous membranes are moist.   Eyes:      Conjunctiva/sclera: Conjunctivae normal.      Pupils: Pupils are equal, round, and reactive to light.   Cardiovascular:      Rate and Rhythm: Normal rate and regular rhythm.      Pulses: Normal pulses.      Heart sounds: Normal heart sounds.   Pulmonary:      Effort: Pulmonary effort is normal.      Breath sounds: Normal breath sounds.   Abdominal:      General: Bowel sounds are normal.      Palpations: Abdomen is soft.   Musculoskeletal:         General: No swelling. Normal range of motion.      Cervical back: Neck supple.   Skin:     General: Skin is warm and dry.      Coloration: Skin is not jaundiced. "   Neurological:      General: No focal deficit present.      Mental Status: He is alert and oriented to person, place, and time. Mental status is at baseline.   Psychiatric:         Mood and Affect: Mood normal.         Behavior: Behavior normal.         Thought Content: Thought content normal.         Judgment: Judgment normal.     Patient has no bruising or skin lesions on the right costal margin area questionable tenderness to palpation of the right upper quadrant, told him to monitor closely  Result Review :   No results found for: PROBNP, BNP  CMP    CMP 7/9/22 7/15/22 7/25/22   Glucose 111 (A) 104 (A) 83   BUN 11 13 15   Creatinine 1.16 1.13 1.11   Sodium 140 139 139   Potassium 4.7 4.6 4.7   Chloride 104 101 102   Calcium 9.7 9.6 9.6   Albumin 4.60 4.50 4.30   Total Bilirubin 0.9 0.4 0.5   Alkaline Phosphatase 66 71 70   AST (SGOT) 15 18 22   ALT (SGPT) 18 16 27   (A) Abnormal value            CBC w/diff    CBC w/Diff 7/9/22 7/15/22 7/25/22   WBC 4.51 4.90 6.19   RBC 4.05 (A) 4.08 (A) 4.20   Hemoglobin 13.5 13.4 13.6   Hematocrit 38.3 37.3 (A) 38.6   MCV 94.6 91.4 91.9   MCH 33.3 (A) 32.8 32.4   MCHC 35.2 35.9 (A) 35.2   RDW 13.0 12.4 12.7   Platelets 143 196 240   Neutrophil Rel % 76.6 (A) 69.4 72.4   Immature Granulocyte Rel % 0.7 (A) 3.9 (A) 1.9 (A)   Lymphocyte Rel % 4.7 (A) 6.1 (A) 8.6 (A)   Monocyte Rel % 17.1 (A) 18.8 (A) 14.5 (A)   Eosinophil Rel % 0.2 (A) 0.8 1.6   Basophil Rel % 0.7 1.0 1.0   (A) Abnormal value                Lab Results   Component Value Date    TSH 1.160 03/29/2022    TSH 1.810 12/29/2020    TSH 1.370 07/06/2020      Lab Results   Component Value Date    FREET4 1.29 03/29/2022    FREET4 1.2 12/29/2020    FREET4 1.1 07/06/2020      A1C Last 3 Results    HGBA1C Last 3 Results 3/29/22   Hemoglobin A1C 6.10 (A)   (A) Abnormal value             PSA    PSA 3/29/22 7/25/22   PSA 1.580 1.780                          Visit Diagnoses:    ICD-10-CM ICD-9-CM   1. Other fatigue  R53.83 780.79    2. Vitamin D deficiency  E55.9 268.9   3. Transient ischemic attack  G45.9 435.9   4. Kidney stones  N20.0 592.0   5. SHANI on CPAP  G47.33 327.23    Z99.89 V46.8   6. Hypertension, essential  I10 401.9   7. Screening PSA (prostate specific antigen)  Z12.5 V76.44   8. Mixed hyperlipidemia  E78.2 272.2   9. Bilateral headaches  R51.9 784.0   10. Hairy cell leukemia not having achieved remission (HCC)  C91.40 202.40   11. Leukopenia, unspecified type  D72.819 288.50   12. Class 2 obesity due to excess calories without serious comorbidity with body mass index (BMI) of 39.0 to 39.9 in adult  E66.09 278.00    Z68.39 V85.39   13. Bleeding nose  R04.0 784.7   14. Urgency incontinence  N39.41 788.31   15. IFG (impaired fasting glucose)  R73.01 790.21       Assessment and Plan   Diagnoses and all orders for this visit:    1. Other fatigue (Primary)  -     CBC & Differential; Future  -     Comprehensive Metabolic Panel; Future  -     Hemoglobin A1c; Future  -     Lipid Panel; Future  -     TSH+Free T4; Future  -     Vitamin B12 anemia; Future  -     Folate anemia; Future    2. Vitamin D deficiency  -     CBC & Differential; Future  -     Comprehensive Metabolic Panel; Future  -     Hemoglobin A1c; Future  -     Lipid Panel; Future  -     TSH+Free T4; Future  -     Vitamin B12 anemia; Future  -     Folate anemia; Future    3. Transient ischemic attack  -     CBC & Differential; Future  -     Comprehensive Metabolic Panel; Future  -     Hemoglobin A1c; Future  -     Lipid Panel; Future  -     TSH+Free T4; Future  -     Vitamin B12 anemia; Future  -     Folate anemia; Future    4. Kidney stones  -     CBC & Differential; Future  -     Comprehensive Metabolic Panel; Future  -     Hemoglobin A1c; Future  -     Lipid Panel; Future  -     TSH+Free T4; Future  -     Vitamin B12 anemia; Future  -     Folate anemia; Future    5. SHANI on CPAP  -     CBC & Differential; Future  -     Comprehensive Metabolic Panel; Future  -     Hemoglobin  A1c; Future  -     Lipid Panel; Future  -     TSH+Free T4; Future  -     Vitamin B12 anemia; Future  -     Folate anemia; Future    6. Hypertension, essential  -     CBC & Differential; Future  -     Comprehensive Metabolic Panel; Future  -     Hemoglobin A1c; Future  -     Lipid Panel; Future  -     TSH+Free T4; Future  -     Vitamin B12 anemia; Future  -     Folate anemia; Future    7. Screening PSA (prostate specific antigen)  -     CBC & Differential; Future  -     Comprehensive Metabolic Panel; Future  -     Hemoglobin A1c; Future  -     Lipid Panel; Future  -     TSH+Free T4; Future  -     Vitamin B12 anemia; Future  -     Folate anemia; Future    8. Mixed hyperlipidemia  -     CBC & Differential; Future  -     Comprehensive Metabolic Panel; Future  -     Hemoglobin A1c; Future  -     Lipid Panel; Future  -     TSH+Free T4; Future  -     Vitamin B12 anemia; Future  -     Folate anemia; Future    9. Bilateral headaches  -     CBC & Differential; Future  -     Comprehensive Metabolic Panel; Future  -     Hemoglobin A1c; Future  -     Lipid Panel; Future  -     TSH+Free T4; Future  -     Vitamin B12 anemia; Future  -     Folate anemia; Future    10. Hairy cell leukemia not having achieved remission (HCC)  -     CBC & Differential; Future  -     Comprehensive Metabolic Panel; Future  -     Hemoglobin A1c; Future  -     Lipid Panel; Future  -     TSH+Free T4; Future  -     Vitamin B12 anemia; Future  -     Folate anemia; Future    11. Leukopenia, unspecified type  -     CBC & Differential; Future  -     Comprehensive Metabolic Panel; Future  -     Hemoglobin A1c; Future  -     Lipid Panel; Future  -     TSH+Free T4; Future  -     Vitamin B12 anemia; Future  -     Folate anemia; Future    12. Class 2 obesity due to excess calories without serious comorbidity with body mass index (BMI) of 39.0 to 39.9 in adult  -     CBC & Differential; Future  -     Comprehensive Metabolic Panel; Future  -     Hemoglobin A1c;  Future  -     Lipid Panel; Future  -     TSH+Free T4; Future  -     Vitamin B12 anemia; Future  -     Folate anemia; Future    13. Bleeding nose  -     CBC & Differential; Future  -     Comprehensive Metabolic Panel; Future  -     Hemoglobin A1c; Future  -     Lipid Panel; Future  -     TSH+Free T4; Future  -     Vitamin B12 anemia; Future  -     Folate anemia; Future    14. Urgency incontinence  -     CBC & Differential; Future  -     Comprehensive Metabolic Panel; Future  -     Hemoglobin A1c; Future  -     Lipid Panel; Future  -     TSH+Free T4; Future  -     Vitamin B12 anemia; Future  -     Folate anemia; Future    15. IFG (impaired fasting glucose)  -     CBC & Differential; Future  -     Comprehensive Metabolic Panel; Future  -     Hemoglobin A1c; Future  -     Lipid Panel; Future  -     TSH+Free T4; Future  -     Vitamin B12 anemia; Future  -     Folate anemia; Future    Right upper quadrant right costal margin pain etiology unclear intermittent right now will monitor consider CT scan if becomes more persistent or notices any bruising in that area, September 2022    HA--//Neck pain with radiculopathy---, June 2018   results reviewed, no significant spinal pathology, no significant stroke on MRI of the brain, right maxillary sinus polyp, July 2018,---Headaches,, occipital neuralgia, treatment options discussed with muscle relaxers, doxepin, etc.--work-up in the ER including a CT of the brain did not show any acute findings LP was not performed, patient's clinical symptoms are improving daily as of July 15, 2022    Hypertension decreased quinapril to 40 mg once a day, continues on spironolactone 25 mg daily feeling better blood pressures better,     Hairy cell leukemia, status posttreatment Uma 15, 2022--with IV cladribine infusion for 7 days, Uma 15, 2022,-leukopenia, -------continued slow drop over the past 2 to 3 years since 2019, discussed with patient March 30, 2022 will send to Dr. Nayak for second  opinion, patient ended up going up to [place] Dr. Addison hairy cell leukemia diagnosis, currently monitoring without aggressive treatment, September 2022,    Elevated PSA and new diagnosis at 4.8 January 2021,, status post treatment with Cipro, improved stable currently at 1.5 March 2022,--PSA 1.78 July 2022    umbilical hernia --- did see Adonay Sainz September 2021--wants to lose wgt first march 2022    Mild reactive depression, --cont Celexa 20 mg , Wellbutrin  MG QD      Polycythemia differential diagnosis includes polycythemia vera versus testosterone replacement the patient is recently started November 2019, patient also on thyroid replacement therapy---ff testosterone January 2021,, much improved off testosterone, September 2022    HTN -  Cont Quinapril 40 mg once a day,, doxazosin 4 mg daily at bedtime, spironolactone 25 mg daily---workup to include plasma metanephrine, renin, aldosterone and chemistry panels, August 2018  All  normal--    LACUNAR INFARCT L BASAL GANGLIA, OLD , ON CT BRAIN JUNE 2018, -- echocardiogram,Normal ejection fraction otherwise unremarkable July 2018 carotid ultrasound, No significant stenosis July 2018,, Continue high-dose moderate dose statin therapy along with aspirin 81 mg daily, recommendations are to stop NSAIDs and go back to arthritis strength Tylenol to help with blood pressure control and lower risk of stroke etc.,    S/P JA CHOLEY MAY 2019, [place] ----discussed postcholecystectomy diarrhea and--treatment options, Colestid March 2022    Jc, on cpap, 2019 , --    r shoulder mass, r/o lipoma--DID SEE ORTHO     elevated chol --cont -ROUSAVASTATIN 10 MG QD ,      IFG,,  hga1c , up to 6.1 March 2022,    C SCOPE 2013, [place] ,Repeat colonoscopy September 2019, , Colonoscopy noted, one small tubular adenoma    Follow Up   No follow-ups on file.  Patient was given instructions and counseling regarding his condition or for health maintenance advice. Please see  specific information pulled into the AVS if appropriate.

## 2022-10-01 ENCOUNTER — TELEPHONE (OUTPATIENT)
Dept: INTERNAL MEDICINE | Facility: CLINIC | Age: 67
End: 2022-10-01

## 2022-10-21 RX ORDER — QUINAPRIL 40 MG/1
TABLET ORAL
Qty: 180 TABLET | Refills: 0 | Status: SHIPPED | OUTPATIENT
Start: 2022-10-21

## 2022-12-01 ENCOUNTER — TELEPHONE (OUTPATIENT)
Dept: INTERNAL MEDICINE | Facility: CLINIC | Age: 67
End: 2022-12-01

## 2022-12-01 NOTE — TELEPHONE ENCOUNTER
Patient would need to come in to discuss this issue given its side effect potential with heart disease etc., make him an appointment

## 2022-12-01 NOTE — TELEPHONE ENCOUNTER
Caller: Dung Burch    Relationship: Self    Best call back number: [phone]    What is the best time to reach you: ANYTIME    Who are you requesting to speak with (clinical staff, provider,  specific staff member): FREDRICK         What was the call regarding:     THE PATIENT SAID HE SPOKE TO HIS ONCOLOGIST, DR GILLIAM AND WAS ADVISED TO CALL PCP FREDRICK TO DISCUSS BEING PRESCRIBED TESTOSTERONE. THE PATIENT  HE USED TO TAKE THE CREAM YEARS AGO AND IT WORKED WELL FOR HIM, BUT IS WANTING TO TALK TO PCP FREDRICK TO SEE WHAT HE WOULD RECOMMEND.       [facility] - [place], [place] - [address] - [phone]  - [phone] FX  [phone]        Do you require a callback: YES

## 2022-12-07 ENCOUNTER — OFFICE VISIT (OUTPATIENT)
Dept: INTERNAL MEDICINE | Facility: CLINIC | Age: 67
End: 2022-12-07

## 2022-12-07 VITALS
OXYGEN SATURATION: 93 % | BODY MASS INDEX: 38.06 KG/M2 | WEIGHT: 236.8 LBS | DIASTOLIC BLOOD PRESSURE: 94 MMHG | HEIGHT: 66 IN | TEMPERATURE: 97.8 F | SYSTOLIC BLOOD PRESSURE: 168 MMHG | HEART RATE: 84 BPM

## 2022-12-07 DIAGNOSIS — R97.20 ELEVATED PSA: ICD-10-CM

## 2022-12-07 DIAGNOSIS — Z12.5 SCREENING PSA (PROSTATE SPECIFIC ANTIGEN): ICD-10-CM

## 2022-12-07 DIAGNOSIS — E78.2 MIXED HYPERLIPIDEMIA: ICD-10-CM

## 2022-12-07 DIAGNOSIS — G45.9 TRANSIENT ISCHEMIC ATTACK: ICD-10-CM

## 2022-12-07 DIAGNOSIS — E66.09 CLASS 2 OBESITY DUE TO EXCESS CALORIES WITHOUT SERIOUS COMORBIDITY WITH BODY MASS INDEX (BMI) OF 39.0 TO 39.9 IN ADULT: ICD-10-CM

## 2022-12-07 DIAGNOSIS — Z99.89 OSA ON CPAP: ICD-10-CM

## 2022-12-07 DIAGNOSIS — E29.1 HYPOGONADISM IN MALE: ICD-10-CM

## 2022-12-07 DIAGNOSIS — R53.83 OTHER FATIGUE: Primary | ICD-10-CM

## 2022-12-07 DIAGNOSIS — C91.40 HAIRY CELL LEUKEMIA NOT HAVING ACHIEVED REMISSION: ICD-10-CM

## 2022-12-07 DIAGNOSIS — R73.01 IFG (IMPAIRED FASTING GLUCOSE): ICD-10-CM

## 2022-12-07 DIAGNOSIS — G47.33 OSA ON CPAP: ICD-10-CM

## 2022-12-07 DIAGNOSIS — I10 HYPERTENSION, ESSENTIAL: ICD-10-CM

## 2022-12-07 DIAGNOSIS — E55.9 VITAMIN D DEFICIENCY: ICD-10-CM

## 2022-12-07 PROCEDURE — 99214 OFFICE O/P EST MOD 30 MIN: CPT | Performed by: INTERNAL MEDICINE

## 2022-12-07 NOTE — PROGRESS NOTES
"Answers for HPI/ROS submitted by the patient on 12/7/2022  What is the primary reason for your visit?: Cough  Chronicity: new  Onset: in the past 7 days  Progression since onset: gradually worsening  Frequency: every few minutes  Cough characteristics: non-productive  ear congestion: No  headaches: Yes  heartburn: No  hemoptysis: No  nasal congestion: Yes  sweats: No  Aggravated by: nothing  Risk factors for lung disease: animal exposure    CHIEF COMPLAINT: Both have had the flu this past week, he and his wife, they went to urgent care got steroids and, over-the-counter medications, no Tamiflu was given,  Fatigue, Annual Exam, and testosterone levels      HPI: Follow-up with multiple medical issues history of hairy cell leukemia, weight issues blood pressure concerns, stress levels fatigue,        Objective   Vital Signs  Vitals:    12/07/22 1037   BP: 168/94   Pulse: 84   Temp: 97.8 °F (36.6 °C)   SpO2: 93%   Weight: 107 kg (236 lb 12.8 oz)   Height: 167.6 cm (65.98\")      Body mass index is 38.24 kg/m².  Review of Systems as above  Physical Exam  Constitutional:       General: He is not in acute distress.     Appearance: Normal appearance. He is obese.   HENT:      Head: Normocephalic.      Mouth/Throat:      Mouth: Mucous membranes are moist.   Eyes:      Conjunctiva/sclera: Conjunctivae normal.      Pupils: Pupils are equal, round, and reactive to light.   Cardiovascular:      Rate and Rhythm: Normal rate and regular rhythm.      Pulses: Normal pulses.      Heart sounds: Normal heart sounds.   Pulmonary:      Effort: Pulmonary effort is normal.      Breath sounds: Normal breath sounds.   Abdominal:      General: Bowel sounds are normal.      Palpations: Abdomen is soft.   Musculoskeletal:         General: No swelling. Normal range of motion.      Cervical back: Neck supple.      Right lower leg: Edema present.      Left lower leg: Edema present.   Skin:     General: Skin is warm and dry.      Coloration: Skin is " not jaundiced.   Neurological:      General: No focal deficit present.      Mental Status: He is alert and oriented to person, place, and time. Mental status is at baseline.   Psychiatric:         Mood and Affect: Mood normal.         Behavior: Behavior normal.         Thought Content: Thought content normal.         Judgment: Judgment normal.     Answers for HPI/ROS submitted by the patient on 12/7/2022  What is the primary reason for your visit?: Cough  Chronicity: new  Onset: in the past 7 days  Progression since onset: gradually worsening  Frequency: every few minutes  Cough characteristics: non-productive  ear congestion: No  headaches: Yes  heartburn: No  hemoptysis: No  nasal congestion: Yes  sweats: No  Aggravated by: nothing  Risk factors for lung disease: animal exposure       Result Review :   No results found for: PROBNP, BNP  CMP    CMP 7/15/22 7/25/22 9/29/22   Glucose 104 (A) 83 116 (A)   BUN 13 15 16   Creatinine 1.13 1.11 1.01   Sodium 139 139 137   Potassium 4.6 4.7 4.6   Chloride 101 102 103   Calcium 9.6 9.6 8.8   Albumin 4.50 4.30 4.30   Total Bilirubin 0.4 0.5 0.3   Alkaline Phosphatase 71 70 55   AST (SGOT) 18 22 24   ALT (SGPT) 16 27 29   (A) Abnormal value            CBC w/diff    CBC w/Diff 7/15/22 7/25/22 9/29/22   WBC 4.90 6.19 4.94   RBC 4.08 (A) 4.20 4.28   Hemoglobin 13.4 13.6 13.7   Hematocrit 37.3 (A) 38.6 38.4   MCV 91.4 91.9 89.7   MCH 32.8 32.4 32.0   MCHC 35.9 (A) 35.2 35.7   RDW 12.4 12.7 12.7   Platelets 196 240 182   Neutrophil Rel % 69.4 72.4 73.1   Immature Granulocyte Rel % 3.9 (A) 1.9 (A) 0.6 (A)   Lymphocyte Rel % 6.1 (A) 8.6 (A) 9.3 (A)   Monocyte Rel % 18.8 (A) 14.5 (A) 11.1   Eosinophil Rel % 0.8 1.6 5.1   Basophil Rel % 1.0 1.0 0.8   (A) Abnormal value             Lipid Panel    Lipid Panel 9/29/22   Total Cholesterol 142   Triglycerides 187 (A)   HDL Cholesterol 59   VLDL Cholesterol 30   LDL Cholesterol  53   LDL/HDL Ratio 0.77   (A) Abnormal value             Lab  Results   Component Value Date    TSH 1.160 03/29/2022    TSH 1.810 12/29/2020    TSH 1.370 07/06/2020      Lab Results   Component Value Date    FREET4 1.29 03/29/2022    FREET4 1.2 12/29/2020    FREET4 1.1 07/06/2020      A1C Last 3 Results    HGBA1C Last 3 Results 3/29/22   Hemoglobin A1C 6.10 (A)   (A) Abnormal value             PSA    PSA 3/29/22 7/25/22   PSA 1.580 1.780                          Visit Diagnoses:    ICD-10-CM ICD-9-CM   1. Other fatigue  R53.83 780.79   2. Vitamin D deficiency  E55.9 268.9   3. Transient ischemic attack  G45.9 435.9   4. SHANI on CPAP  G47.33 327.23    Z99.89 V46.8   5. Hypertension, essential  I10 401.9   6. Hairy cell leukemia not having achieved remission (HCC)  C91.40 202.40   7. Mixed hyperlipidemia  E78.2 272.2   8. Class 2 obesity due to excess calories without serious comorbidity with body mass index (BMI) of 39.0 to 39.9 in adult  E66.09 278.00    Z68.39 V85.39   9. IFG (impaired fasting glucose)  R73.01 790.21   10. Screening PSA (prostate specific antigen)  Z12.5 V76.44   11. Hypogonadism in male  E29.1 257.2   12. Elevated PSA  R97.20 790.93       Assessment and Plan   Diagnoses and all orders for this visit:    1. Other fatigue (Primary)  -     CBC & Differential; Future  -     Comprehensive Metabolic Panel; Future  -     Hemoglobin A1c; Future  -     Lipid Panel; Future  -     TSH+Free T4; Future  -     Sedimentation Rate; Future  -     Testosterone, Free, Total; Future  -     PSA DIAGNOSTIC; Future  -     Hemoglobin A1c; Future    2. Vitamin D deficiency  -     CBC & Differential; Future  -     Comprehensive Metabolic Panel; Future  -     Hemoglobin A1c; Future  -     Lipid Panel; Future  -     TSH+Free T4; Future  -     Sedimentation Rate; Future  -     Testosterone, Free, Total; Future  -     PSA DIAGNOSTIC; Future  -     Hemoglobin A1c; Future    3. Transient ischemic attack  -     CBC & Differential; Future  -     Comprehensive Metabolic Panel; Future  -      Hemoglobin A1c; Future  -     Lipid Panel; Future  -     TSH+Free T4; Future  -     Sedimentation Rate; Future  -     Testosterone, Free, Total; Future  -     PSA DIAGNOSTIC; Future  -     Hemoglobin A1c; Future    4. SHANI on CPAP  -     CBC & Differential; Future  -     Comprehensive Metabolic Panel; Future  -     Hemoglobin A1c; Future  -     Lipid Panel; Future  -     TSH+Free T4; Future  -     Sedimentation Rate; Future  -     Testosterone, Free, Total; Future  -     PSA DIAGNOSTIC; Future  -     Hemoglobin A1c; Future    5. Hypertension, essential  -     CBC & Differential; Future  -     Comprehensive Metabolic Panel; Future  -     Hemoglobin A1c; Future  -     Lipid Panel; Future  -     TSH+Free T4; Future  -     Sedimentation Rate; Future  -     Testosterone, Free, Total; Future  -     PSA DIAGNOSTIC; Future  -     Hemoglobin A1c; Future    6. Hairy cell leukemia not having achieved remission (HCC)  -     CBC & Differential; Future  -     Comprehensive Metabolic Panel; Future  -     Hemoglobin A1c; Future  -     Lipid Panel; Future  -     TSH+Free T4; Future  -     Sedimentation Rate; Future  -     Testosterone, Free, Total; Future  -     PSA DIAGNOSTIC; Future  -     Hemoglobin A1c; Future    7. Mixed hyperlipidemia  -     CBC & Differential; Future  -     Comprehensive Metabolic Panel; Future  -     Hemoglobin A1c; Future  -     Lipid Panel; Future  -     TSH+Free T4; Future  -     Sedimentation Rate; Future  -     Testosterone, Free, Total; Future  -     PSA DIAGNOSTIC; Future  -     Hemoglobin A1c; Future    8. Class 2 obesity due to excess calories without serious comorbidity with body mass index (BMI) of 39.0 to 39.9 in adult  -     CBC & Differential; Future  -     Comprehensive Metabolic Panel; Future  -     Hemoglobin A1c; Future  -     Lipid Panel; Future  -     TSH+Free T4; Future  -     Sedimentation Rate; Future  -     Testosterone, Free, Total; Future  -     PSA DIAGNOSTIC; Future  -      Hemoglobin A1c; Future    9. IFG (impaired fasting glucose)  -     CBC & Differential; Future  -     Comprehensive Metabolic Panel; Future  -     Hemoglobin A1c; Future  -     Lipid Panel; Future  -     TSH+Free T4; Future  -     Sedimentation Rate; Future  -     Testosterone, Free, Total; Future  -     PSA DIAGNOSTIC; Future  -     Hemoglobin A1c; Future    10. Screening PSA (prostate specific antigen)  -     CBC & Differential; Future  -     Comprehensive Metabolic Panel; Future  -     Hemoglobin A1c; Future  -     Lipid Panel; Future  -     TSH+Free T4; Future  -     Sedimentation Rate; Future  -     Testosterone, Free, Total; Future  -     PSA DIAGNOSTIC; Future  -     Hemoglobin A1c; Future    11. Hypogonadism in male  -     CBC & Differential; Future  -     Comprehensive Metabolic Panel; Future  -     Hemoglobin A1c; Future  -     Lipid Panel; Future  -     TSH+Free T4; Future  -     Sedimentation Rate; Future  -     Testosterone, Free, Total; Future  -     PSA DIAGNOSTIC; Future  -     Hemoglobin A1c; Future    12. Elevated PSA  -     CBC & Differential; Future  -     Comprehensive Metabolic Panel; Future  -     Hemoglobin A1c; Future  -     Lipid Panel; Future  -     TSH+Free T4; Future  -     Sedimentation Rate; Future  -     Testosterone, Free, Total; Future  -     PSA DIAGNOSTIC; Future  -     Hemoglobin A1c; Future    influenzae a , dec 2022, rx with steroids     Hypogonadism, treatment options risks cardiovascular, polycythemia, elevated PSA in the past all discussed December 7, 2022 we will send in testosterone gel 200 mg/g with a an application or 1 pump daily to 2 pumps daily depending on levels, December 7, 2022 spoke with [facility] about this prescription today with patient,    HA--//Neck pain with radiculopathy---, June 2018   results reviewed, no significant spinal pathology, no stroke on MRI of the brain, right maxillary sinus polyp, July 2018,---Headaches,, occipital neuralgia,  treatment options discussed with muscle relaxers, doxepin, etc.--work-up in the ER including a CT of the brain did not show any acute findings LP was not performed, patient's clinical symptoms are improving daily as of July 15, 2022    Hairy cell leukemia, status posttreatment Uma 15, 2022--with IV cladribine infusion for 7 days, Uma 15, 2022,-leukopenia, -------continued slow drop over the past 2 to 3 years since 2019, discussed with patient March 30, 2022 will send to Dr. Nayak for second opinion, patient ended up going up to [place] [facility]. [place] hairy cell leukemia diagnosis, currently monitoring without aggressive treatment, September 2022,, and December 7, 2022    Elevated PSA and new diagnosis at 4.8 January 2021,, status post treatment with Cipro, improved stable currently at 1.5 March 2022,--PSA 1.78 July 2022    umbilical hernia --- did see Adonay Sainz September 2021--wants to lose wgt first--- march 2022    Mild reactive depression, --cont Celexa 20 mg , Wellbutrin  MG QD      Polycythemia - much improved off testosterone, September 2022    HTN -  Cont Quinapril 40 mg once a day,, doxazosin 4 mg daily at bedtime, spironolactone 25 mg daily---workup to include plasma metanephrine, renin, aldosterone and chemistry panels, August 2018  All  normal--    LACUNAR INFARCT L BASAL GANGLIA, OLD , ON CT BRAIN JUNE 2018, -- echocardiogram,Normal ejection fraction otherwise unremarkable July 2018 carotid ultrasound, No significant stenosis July 2018,, Continue high-dose moderate dose statin therapy along with aspirin 81 mg daily, recommendations are to stop NSAIDs and go back to arthritis strength Tylenol to help with blood pressure control and lower risk of stroke etc.,    S/P LAP CHOLEY MAY 2019, [facility] ----discussed postcholecystectomy diarrhea and--treatment options, Colestid March 2022    Jc, on cpap, 2019 , --    r shoulder mass, r/o lipoma--DID SEE ORTHO     elevated chol --cont  -ROUSAVASTATIN 10 MG QD ,      IFG,,  hga1c , up to 6.1 March 2022,    C SCOPE 2013, HARLEY ,Repeat colonoscopy September 2019, , Colonoscopy noted, one small tubular adenoma, consider repeat colonoscopy in the next year or 2, discussed December 2022    Follow Up   Return in about 4 months (around 4/7/2023).  Patient was given instructions and counseling regarding his condition or for health maintenance advice. Please see specific information pulled into the AVS if appropriate.         Answers for HPI/ROS submitted by the patient on 12/7/2022  What is the primary reason for your visit?: Cough  Chronicity: new  Onset: in the past 7 days  Progression since onset: gradually worsening  Frequency: every few minutes  Cough characteristics: non-productive  ear congestion: No  headaches: Yes  heartburn: No  hemoptysis: No  nasal congestion: Yes  sweats: No  Aggravated by: nothing  Risk factors for lung disease: animal exposure

## 2023-01-05 ENCOUNTER — LAB (OUTPATIENT)
Dept: LAB | Facility: HOSPITAL | Age: 68
End: 2023-01-05
Payer: MEDICARE

## 2023-01-05 ENCOUNTER — OFFICE VISIT (OUTPATIENT)
Dept: INTERNAL MEDICINE | Facility: CLINIC | Age: 68
End: 2023-01-05
Payer: MEDICARE

## 2023-01-05 ENCOUNTER — HOSPITAL ENCOUNTER (OUTPATIENT)
Dept: CT IMAGING | Facility: HOSPITAL | Age: 68
Discharge: HOME | End: 2023-01-05
Payer: MEDICARE

## 2023-01-05 VITALS
BODY MASS INDEX: 38.57 KG/M2 | DIASTOLIC BLOOD PRESSURE: 83 MMHG | HEIGHT: 66 IN | OXYGEN SATURATION: 97 % | HEART RATE: 80 BPM | SYSTOLIC BLOOD PRESSURE: 158 MMHG | RESPIRATION RATE: 18 BRPM | TEMPERATURE: 97.3 F | WEIGHT: 240 LBS

## 2023-01-05 DIAGNOSIS — M54.6 ACUTE THORACIC BACK PAIN, UNSPECIFIED BACK PAIN LATERALITY: ICD-10-CM

## 2023-01-05 DIAGNOSIS — E55.9 VITAMIN D DEFICIENCY: ICD-10-CM

## 2023-01-05 DIAGNOSIS — N41.0 ACUTE PROSTATITIS: ICD-10-CM

## 2023-01-05 DIAGNOSIS — R53.83 OTHER FATIGUE: ICD-10-CM

## 2023-01-05 DIAGNOSIS — R10.11 RUQ ABDOMINAL PAIN: Primary | ICD-10-CM

## 2023-01-05 DIAGNOSIS — N40.0 ENLARGED PROSTATE: ICD-10-CM

## 2023-01-05 DIAGNOSIS — R10.11 RUQ ABDOMINAL PAIN: ICD-10-CM

## 2023-01-05 LAB
25(OH)D3 SERPL-MCNC: 54.5 NG/ML (ref 30–100)
ALBUMIN SERPL-MCNC: 4.6 G/DL (ref 3.5–5.2)
ALBUMIN/GLOB SERPL: 1.7 G/DL
ALP SERPL-CCNC: 60 U/L (ref 39–117)
ALT SERPL W P-5'-P-CCNC: 29 U/L (ref 1–41)
AMYLASE SERPL-CCNC: 70 U/L (ref 28–100)
ANION GAP SERPL CALCULATED.3IONS-SCNC: 11.4 MMOL/L (ref 5–15)
AST SERPL-CCNC: 28 U/L (ref 1–40)
BACTERIA UR QL AUTO: ABNORMAL /HPF
BASOPHILS # BLD AUTO: 0.03 10*3/MM3 (ref 0–0.2)
BASOPHILS NFR BLD AUTO: 0.6 % (ref 0–1.5)
BILIRUB SERPL-MCNC: 0.5 MG/DL (ref 0–1.2)
BILIRUB UR QL STRIP: NEGATIVE
BUN SERPL-MCNC: 20 MG/DL (ref 8–23)
BUN/CREAT SERPL: 18.2 (ref 7–25)
CALCIUM SPEC-SCNC: 10 MG/DL (ref 8.6–10.5)
CHLORIDE SERPL-SCNC: 101 MMOL/L (ref 98–107)
CLARITY UR: CLEAR
CO2 SERPL-SCNC: 25.6 MMOL/L (ref 22–29)
COLOR UR: YELLOW
CREAT SERPL-MCNC: 1.1 MG/DL (ref 0.76–1.27)
DEPRECATED RDW RBC AUTO: 45.8 FL (ref 37–54)
EGFRCR SERPLBLD CKD-EPI 2021: 73.6 ML/MIN/1.73
EOSINOPHIL # BLD AUTO: 0.18 10*3/MM3 (ref 0–0.4)
EOSINOPHIL NFR BLD AUTO: 3.5 % (ref 0.3–6.2)
ERYTHROCYTE [DISTWIDTH] IN BLOOD BY AUTOMATED COUNT: 14.1 % (ref 12.3–15.4)
FOLATE SERPL-MCNC: >20 NG/ML (ref 4.78–24.2)
GLOBULIN UR ELPH-MCNC: 2.7 GM/DL
GLUCOSE SERPL-MCNC: 99 MG/DL (ref 65–99)
GLUCOSE UR STRIP-MCNC: NEGATIVE MG/DL
HCT VFR BLD AUTO: 42.7 % (ref 37.5–51)
HGB BLD-MCNC: 14.8 G/DL (ref 13–17.7)
HGB UR QL STRIP.AUTO: NEGATIVE
HYALINE CASTS UR QL AUTO: ABNORMAL /LPF
IMM GRANULOCYTES # BLD AUTO: 0.03 10*3/MM3 (ref 0–0.05)
IMM GRANULOCYTES NFR BLD AUTO: 0.6 % (ref 0–0.5)
KETONES UR QL STRIP: NEGATIVE
LEUKOCYTE ESTERASE UR QL STRIP.AUTO: NEGATIVE
LIPASE SERPL-CCNC: 36 U/L (ref 13–60)
LYMPHOCYTES # BLD AUTO: 0.7 10*3/MM3 (ref 0.7–3.1)
LYMPHOCYTES NFR BLD AUTO: 13.5 % (ref 19.6–45.3)
MCH RBC QN AUTO: 31.8 PG (ref 26.6–33)
MCHC RBC AUTO-ENTMCNC: 34.7 G/DL (ref 31.5–35.7)
MCV RBC AUTO: 91.6 FL (ref 79–97)
MONOCYTES # BLD AUTO: 0.66 10*3/MM3 (ref 0.1–0.9)
MONOCYTES NFR BLD AUTO: 12.7 % (ref 5–12)
NEUTROPHILS NFR BLD AUTO: 3.6 10*3/MM3 (ref 1.7–7)
NEUTROPHILS NFR BLD AUTO: 69.1 % (ref 42.7–76)
NITRITE UR QL STRIP: NEGATIVE
NRBC BLD AUTO-RTO: 0 /100 WBC (ref 0–0.2)
PH UR STRIP.AUTO: 6.5 [PH] (ref 5–8)
PLATELET # BLD AUTO: 203 10*3/MM3 (ref 140–450)
PMV BLD AUTO: 9.7 FL (ref 6–12)
POTASSIUM SERPL-SCNC: 5.1 MMOL/L (ref 3.5–5.2)
PROT SERPL-MCNC: 7.3 G/DL (ref 6–8.5)
PROT UR QL STRIP: NEGATIVE
RBC # BLD AUTO: 4.66 10*6/MM3 (ref 4.14–5.8)
RBC # UR STRIP: ABNORMAL /HPF
REF LAB TEST METHOD: ABNORMAL
SODIUM SERPL-SCNC: 138 MMOL/L (ref 136–145)
SP GR UR STRIP: 1.01 (ref 1–1.03)
SQUAMOUS #/AREA URNS HPF: ABNORMAL /HPF
T4 FREE SERPL-MCNC: 1.15 NG/DL (ref 0.93–1.7)
TSH SERPL DL<=0.05 MIU/L-ACNC: 0.85 UIU/ML (ref 0.27–4.2)
UROBILINOGEN UR QL STRIP: NORMAL
VIT B12 BLD-MCNC: 649 PG/ML (ref 211–946)
WBC # UR STRIP: ABNORMAL /HPF
WBC NRBC COR # BLD: 5.2 10*3/MM3 (ref 3.4–10.8)

## 2023-01-05 PROCEDURE — 84153 ASSAY OF PSA TOTAL: CPT

## 2023-01-05 PROCEDURE — 83690 ASSAY OF LIPASE: CPT

## 2023-01-05 PROCEDURE — 82150 ASSAY OF AMYLASE: CPT

## 2023-01-05 PROCEDURE — 85025 COMPLETE CBC W/AUTO DIFF WBC: CPT

## 2023-01-05 PROCEDURE — 82306 VITAMIN D 25 HYDROXY: CPT

## 2023-01-05 PROCEDURE — 0 IOPAMIDOL PER 1 ML

## 2023-01-05 PROCEDURE — 36415 COLL VENOUS BLD VENIPUNCTURE: CPT

## 2023-01-05 PROCEDURE — 81001 URINALYSIS AUTO W/SCOPE: CPT

## 2023-01-05 PROCEDURE — 74178 CT ABD&PLV WO CNTR FLWD CNTR: CPT

## 2023-01-05 PROCEDURE — 84443 ASSAY THYROID STIM HORMONE: CPT

## 2023-01-05 PROCEDURE — 99214 OFFICE O/P EST MOD 30 MIN: CPT

## 2023-01-05 PROCEDURE — 80053 COMPREHEN METABOLIC PANEL: CPT

## 2023-01-05 PROCEDURE — 84439 ASSAY OF FREE THYROXINE: CPT

## 2023-01-05 PROCEDURE — 82746 ASSAY OF FOLIC ACID SERUM: CPT

## 2023-01-05 PROCEDURE — 82607 VITAMIN B-12: CPT

## 2023-01-05 RX ADMIN — IOPAMIDOL 100 ML: 755 INJECTION, SOLUTION INTRAVENOUS at 18:36

## 2023-01-05 NOTE — PROGRESS NOTES
Chief Complaint  Pain (Pt states that he has a shooting pain in his mid back typically on the right side, and will wrap around to pain in his stomach area. )    History of Present Illness  SUBJECTIVE  Dung Burch presents to [facility] with complaints of ruq pain and back pain that radiates to his right side. He denies any known injury.   Denies any pain with urination, tenderness to palpation, fever nausea, vomiting, soa, cp, or reflux.  Patient has had this pain for about 3 weeks.   Pain 5-6/10  Patient does admit to having several alcoholic beverages per night.    Past Medical History:   Diagnosis Date   • Cervical root disorders, not elsewhere classified    • Essential (primary) hypertension    • ETOH abuse    • Hairy cell leukemia (HCC)    • High cholesterol    • Impaired fasting glucose    • Pure hypercholesterolemia    • Sleep apnea, unspecified    • Transient cerebral ischemic attack, unspecified    • Vitamin D deficiency, unspecified       Family History   Problem Relation Age of Onset   • Cancer Mother    • Cancer Father    • Hyperlipidemia Father    • COPD Father    • Cancer Paternal Grandfather       Past Surgical History:   Procedure Laterality Date   • CHOLECYSTECTOMY  08/2019   • ENDOSCOPY AND COLONOSCOPY  2008   • KNEE SURGERY Right    • OTHER SURGICAL HISTORY      TUBAL ADENOMA   • OTHER SURGICAL HISTORY Left     CHEEK   • THYROID CYST EXCISION  2000        Current Outpatient Medications:   •  aspirin 81 MG chewable tablet, aspirin 81 mg oral tablet,chewable chew 1 tablet (81 mg) by oral route once daily   Active, Disp: , Rfl:   •  cholecalciferol (VITAMIN D3) 25 MCG (1000 UT) tablet, , Disp: , Rfl:   •  citalopram (CeleXA) 20 MG tablet, Take 1 tablet by mouth Daily., Disp: 90 tablet, Rfl: 1  •  doxycycline (VIBRAMYCIN) 100 MG capsule, Take 1 capsule by mouth 2 (Two) Times a Day., Disp: 20 capsule, Rfl: 0  •  loratadine (CLARITIN) 10 MG tablet, loratadine 10  mg oral tablet take 1 tablet (10 mg) by oral route once daily   Active, Disp: , Rfl:   •  methylPREDNISolone (MEDROL) 4 MG dose pack, Take as directed on package instructions., Disp: 21 tablet, Rfl: 0  •  quinapril (ACCUPRIL) 40 MG tablet, TAKE 1 TABLET BY MOUTH TWO TIMES A DAY (Patient taking differently: Take 40 mg by mouth Daily.), Disp: 180 tablet, Rfl: 0  •  rosuvastatin (CRESTOR) 10 MG tablet, Take 1 tablet by mouth Daily., Disp: 90 tablet, Rfl: 3  •  spironolactone (ALDACTONE) 25 MG tablet, Take 1 tablet by mouth Daily., Disp: 90 tablet, Rfl: 3    OBJECTIVE  Vital Signs:   /83 (BP Location: Right arm)   Pulse 80   Temp 97.3 °F (36.3 °C) (Temporal)   Resp 18   Ht 167.6 cm (65.98\")   Wt 109 kg (240 lb)   SpO2 97%   BMI 38.76 kg/m²    Estimated body mass index is 38.76 kg/m² as calculated from the following:    Height as of this encounter: 167.6 cm (65.98\").    Weight as of this encounter: 109 kg (240 lb).     Wt Readings from Last 3 Encounters:   01/05/23 109 kg (240 lb)   12/07/22 107 kg (236 lb 12.8 oz)   12/05/22 110 kg (243 lb)     BP Readings from Last 3 Encounters:   01/05/23 158/83   12/07/22 168/94   12/05/22 161/91       Physical Exam  Constitutional:       Appearance: He is obese.   HENT:      Head: Normocephalic and atraumatic.   Eyes:      Extraocular Movements: Extraocular movements intact.      Conjunctiva/sclera: Conjunctivae normal.   Cardiovascular:      Rate and Rhythm: Normal rate and regular rhythm.      Pulses: Normal pulses.      Heart sounds: Normal heart sounds.   Pulmonary:      Effort: Pulmonary effort is normal. No respiratory distress.      Breath sounds: Normal breath sounds. No stridor. No wheezing, rhonchi or rales.   Chest:      Chest wall: No tenderness.   Abdominal:      Tenderness: There is abdominal tenderness (Diffuse abdominal tenderness).   Musculoskeletal:         General: Normal range of motion.   Skin:     General: Skin is warm and dry.   Neurological:       General: No focal deficit present.      Mental Status: He is oriented to person, place, and time.   Psychiatric:         Mood and Affect: Mood normal.         Behavior: Behavior normal.         Thought Content: Thought content normal.         Judgment: Judgment normal.          Result Review        No Images in the past 120 days found..     The above data has been reviewed by LETI Rangel 01/05/2023 11:49 EST.          Patient Care Team:  Isaac Matta MD as PCP - General (Internal Medicine)  Dione Nayak MD PhD as Consulting Physician (Hematology and Oncology)        ASSESSMENT & PLAN    Diagnoses and all orders for this visit:    1. RUQ abdominal pain (Primary)  Assessment & Plan:  Patient presents today with complaints of right upper abdominal pain and back pain that radiates to his right side.  Patient denies any known injury.  He denies any fever, chills, nausea, vomiting, diarrhea, shortness of breath, chest pain, acid reflux, hematuria or dysuria.  Patient does rate his pain about a 5 or 6 out of 10.  Patient does admit to having several alcoholic beverages per night.  Patient does not have his gallbladder.  Plan: Stat labs and CT abdomen  Patient to go to the ER with any new or worsening symptoms.    Orders:  -     CBC & Differential; Future  -     Urinalysis With Microscopic - Urine, Clean Catch; Future  -     Comprehensive Metabolic Panel; Future  -     TSH+Free T4; Future  -     CT Abdomen Pelvis With & Without Contrast; Future  -     Amylase; Future  -     Lipase; Future    2. Acute thoracic back pain, unspecified back pain laterality  -     CT Abdomen Pelvis With & Without Contrast; Future  -     Amylase; Future  -     Lipase; Future    3. Vitamin D deficiency  -     Vitamin D,25-Hydroxy; Future    4. Other fatigue  -     CBC & Differential; Future  -     Urinalysis With Microscopic - Urine, Clean Catch; Future  -     Comprehensive Metabolic Panel; Future  -     TSH+Free T4;  Future  -     Vitamin B12 & Folate; Future       Tobacco Use: Low Risk    • Smoking Tobacco Use: Never   • Smokeless Tobacco Use: Never   • Passive Exposure: Never       Follow Up     No follow-ups on file.    Please note that portions of this note were completed with a voice recognition program.    Patient was given instructions and counseling regarding his condition or for health maintenance advice. Please see specific information pulled into the AVS if appropriate.   I have reviewed information obtained and documented by others and I have confirmed the accuracy of this documented note.    LETI Rangel

## 2023-01-05 NOTE — ASSESSMENT & PLAN NOTE
Patient presents today with complaints of right upper abdominal pain and back pain that radiates to his right side.  Patient denies any known injury.  He denies any fever, chills, nausea, vomiting, diarrhea, shortness of breath, chest pain, acid reflux, hematuria or dysuria.  Patient does rate his pain about a 5 or 6 out of 10.  Patient does admit to having several alcoholic beverages per night.  Patient does not have his gallbladder.  Plan: Stat labs and CT abdomen  Patient to go to the ER with any new or worsening symptoms.

## 2023-01-06 DIAGNOSIS — N40.0 ENLARGED PROSTATE: Primary | ICD-10-CM

## 2023-01-06 LAB — PSA SERPL-MCNC: 1.51 NG/ML (ref 0–4)

## 2023-01-06 RX ORDER — CIPROFLOXACIN 500 MG/1
500 TABLET, FILM COATED ORAL 2 TIMES DAILY
Qty: 28 TABLET | Refills: 0 | Status: SHIPPED | OUTPATIENT
Start: 2023-01-06 | End: 2023-03-28

## 2023-01-17 RX ORDER — SPIRONOLACTONE 25 MG/1
TABLET ORAL
Qty: 90 TABLET | Refills: 3 | Status: SHIPPED | OUTPATIENT
Start: 2023-01-17

## 2023-01-17 RX ORDER — ROSUVASTATIN CALCIUM 10 MG/1
TABLET, COATED ORAL
Qty: 90 TABLET | Refills: 3 | Status: SHIPPED | OUTPATIENT
Start: 2023-01-17

## 2023-02-15 ENCOUNTER — TELEPHONE (OUTPATIENT)
Dept: INTERNAL MEDICINE | Facility: CLINIC | Age: 68
End: 2023-02-15
Payer: MEDICARE

## 2023-02-15 DIAGNOSIS — E29.1 HYPOGONADISM IN MALE: Primary | ICD-10-CM

## 2023-02-15 RX ORDER — TESTOSTERONE CYPIONATE 200 MG/ML
200 INJECTION, SOLUTION INTRAMUSCULAR
Qty: 2 ML | Refills: 5 | Status: SHIPPED | OUTPATIENT
Start: 2023-02-15

## 2023-02-15 NOTE — TELEPHONE ENCOUNTER
Call patient tell him we can change to injections if he wants it will be every 2 weeks, Depo testosterone,    Tell patient I called in Depo testosterone to his pharmacy he will have to  the vials and bring them to us to get the injection in his buttock region every 2 weeks,    Give him a time when he can come in and get his first shot in the next week or 2

## 2023-02-15 NOTE — TELEPHONE ENCOUNTER
Caller: Dung Burch    Relationship: Self    Best call back number: [phone]    What medications are you currently taking:   Current Outpatient Medications on File Prior to Visit   Medication Sig Dispense Refill   • aspirin 81 MG chewable tablet aspirin 81 mg oral tablet,chewable chew 1 tablet (81 mg) by oral route once daily   Active     • cholecalciferol (VITAMIN D3) 25 MCG (1000 UT) tablet      • ciprofloxacin (Cipro) 500 MG tablet Take 1 tablet by mouth 2 (Two) Times a Day. 28 tablet 0   • citalopram (CeleXA) 20 MG tablet Take 1 tablet by mouth Daily. 90 tablet 1   • doxycycline (VIBRAMYCIN) 100 MG capsule Take 1 capsule by mouth 2 (Two) Times a Day. 20 capsule 0   • loratadine (CLARITIN) 10 MG tablet loratadine 10 mg oral tablet take 1 tablet (10 mg) by oral route once daily   Active     • methylPREDNISolone (MEDROL) 4 MG dose pack Take as directed on package instructions. 21 tablet 0   • quinapril (ACCUPRIL) 40 MG tablet TAKE 1 TABLET BY MOUTH TWO TIMES A DAY (Patient taking differently: Take 40 mg by mouth Daily.) 180 tablet 0   • rosuvastatin (CRESTOR) 10 MG tablet TAKE 1 TABLET BY MOUTH EVERY DAY 90 tablet 3   • spironolactone (ALDACTONE) 25 MG tablet TAKE 1 TABLET BY MOUTH EVERY DAY 90 tablet 3     No current facility-administered medications on file prior to visit.          Which medication are you concerned about: TESTOSTERONE     Who prescribed you this medication: FREDRICK    What are your concerns: [facility] IS REQUESTING TO CHANGE HIS TESTOSTERONE MEDICATION TO INJECTIONS INSTEAD OF THE CREAM SINCE THEY ARE UNABLE TO GET THE CREAM IN STOCK.     How long have you had these concerns: OVER A WEEK

## 2023-03-02 ENCOUNTER — LAB (OUTPATIENT)
Dept: INTERNAL MEDICINE | Facility: CLINIC | Age: 68
End: 2023-03-02
Payer: MEDICARE

## 2023-03-02 DIAGNOSIS — E29.1 HYPOGONADISM IN MALE: Primary | ICD-10-CM

## 2023-03-02 PROCEDURE — 96372 THER/PROPH/DIAG INJ SC/IM: CPT | Performed by: INTERNAL MEDICINE

## 2023-03-02 RX ORDER — TESTOSTERONE CYPIONATE 200 MG/ML
200 INJECTION, SOLUTION INTRAMUSCULAR
Status: DISCONTINUED | OUTPATIENT
Start: 2023-03-02 | End: 2023-03-28

## 2023-03-02 RX ADMIN — TESTOSTERONE CYPIONATE 200 MG: 200 INJECTION, SOLUTION INTRAMUSCULAR at 16:17

## 2023-03-16 ENCOUNTER — CLINICAL SUPPORT (OUTPATIENT)
Dept: INTERNAL MEDICINE | Facility: CLINIC | Age: 68
End: 2023-03-16
Payer: MEDICARE

## 2023-03-16 PROCEDURE — 96372 THER/PROPH/DIAG INJ SC/IM: CPT | Performed by: INTERNAL MEDICINE

## 2023-03-16 RX ADMIN — TESTOSTERONE CYPIONATE 200 MG: 200 INJECTION, SOLUTION INTRAMUSCULAR at 12:33

## 2023-03-22 RX ORDER — CITALOPRAM 20 MG/1
20 TABLET ORAL DAILY
Qty: 90 TABLET | Refills: 1 | Status: SHIPPED | OUTPATIENT
Start: 2023-03-22

## 2023-03-27 ENCOUNTER — LAB (OUTPATIENT)
Dept: LAB | Facility: HOSPITAL | Age: 68
End: 2023-03-27
Payer: MEDICARE

## 2023-03-27 DIAGNOSIS — Z12.5 SCREENING PSA (PROSTATE SPECIFIC ANTIGEN): ICD-10-CM

## 2023-03-27 DIAGNOSIS — E78.2 MIXED HYPERLIPIDEMIA: ICD-10-CM

## 2023-03-27 DIAGNOSIS — E29.1 HYPOGONADISM IN MALE: ICD-10-CM

## 2023-03-27 DIAGNOSIS — E66.09 CLASS 2 OBESITY DUE TO EXCESS CALORIES WITHOUT SERIOUS COMORBIDITY WITH BODY MASS INDEX (BMI) OF 39.0 TO 39.9 IN ADULT: ICD-10-CM

## 2023-03-27 DIAGNOSIS — R53.83 OTHER FATIGUE: ICD-10-CM

## 2023-03-27 DIAGNOSIS — R97.20 ELEVATED PSA: ICD-10-CM

## 2023-03-27 DIAGNOSIS — G47.33 OSA ON CPAP: ICD-10-CM

## 2023-03-27 DIAGNOSIS — R51.9 BILATERAL HEADACHES: ICD-10-CM

## 2023-03-27 DIAGNOSIS — N39.41 URGENCY INCONTINENCE: ICD-10-CM

## 2023-03-27 DIAGNOSIS — D72.819 LEUKOPENIA, UNSPECIFIED TYPE: ICD-10-CM

## 2023-03-27 DIAGNOSIS — Z99.89 OSA ON CPAP: ICD-10-CM

## 2023-03-27 DIAGNOSIS — R04.0 BLEEDING NOSE: ICD-10-CM

## 2023-03-27 DIAGNOSIS — R73.01 IFG (IMPAIRED FASTING GLUCOSE): ICD-10-CM

## 2023-03-27 DIAGNOSIS — C91.40 HAIRY CELL LEUKEMIA NOT HAVING ACHIEVED REMISSION: ICD-10-CM

## 2023-03-27 DIAGNOSIS — G45.9 TRANSIENT ISCHEMIC ATTACK: ICD-10-CM

## 2023-03-27 DIAGNOSIS — N20.0 KIDNEY STONES: ICD-10-CM

## 2023-03-27 DIAGNOSIS — E55.9 VITAMIN D DEFICIENCY: ICD-10-CM

## 2023-03-27 DIAGNOSIS — I10 HYPERTENSION, ESSENTIAL: ICD-10-CM

## 2023-03-27 LAB
ALBUMIN SERPL-MCNC: 4.8 G/DL (ref 3.5–5.2)
ALBUMIN/GLOB SERPL: 1.9 G/DL
ALP SERPL-CCNC: 56 U/L (ref 39–117)
ALT SERPL W P-5'-P-CCNC: 33 U/L (ref 1–41)
ANION GAP SERPL CALCULATED.3IONS-SCNC: 11.1 MMOL/L (ref 5–15)
AST SERPL-CCNC: 31 U/L (ref 1–40)
BASOPHILS # BLD AUTO: 0.04 10*3/MM3 (ref 0–0.2)
BASOPHILS NFR BLD AUTO: 0.9 % (ref 0–1.5)
BILIRUB SERPL-MCNC: 0.6 MG/DL (ref 0–1.2)
BUN SERPL-MCNC: 14 MG/DL (ref 8–23)
BUN/CREAT SERPL: 13.1 (ref 7–25)
CALCIUM SPEC-SCNC: 10.1 MG/DL (ref 8.6–10.5)
CHLORIDE SERPL-SCNC: 102 MMOL/L (ref 98–107)
CHOLEST SERPL-MCNC: 143 MG/DL (ref 0–200)
CO2 SERPL-SCNC: 24.9 MMOL/L (ref 22–29)
CREAT SERPL-MCNC: 1.07 MG/DL (ref 0.76–1.27)
DEPRECATED RDW RBC AUTO: 45.1 FL (ref 37–54)
EGFRCR SERPLBLD CKD-EPI 2021: 76.1 ML/MIN/1.73
EOSINOPHIL # BLD AUTO: 0.11 10*3/MM3 (ref 0–0.4)
EOSINOPHIL NFR BLD AUTO: 2.4 % (ref 0.3–6.2)
ERYTHROCYTE [DISTWIDTH] IN BLOOD BY AUTOMATED COUNT: 13.1 % (ref 12.3–15.4)
ERYTHROCYTE [SEDIMENTATION RATE] IN BLOOD: 2 MM/HR (ref 0–20)
FOLATE SERPL-MCNC: >20 NG/ML (ref 4.78–24.2)
GLOBULIN UR ELPH-MCNC: 2.5 GM/DL
GLUCOSE SERPL-MCNC: 99 MG/DL (ref 65–99)
HBA1C MFR BLD: 5.6 % (ref 4.8–5.6)
HCT VFR BLD AUTO: 44 % (ref 37.5–51)
HDLC SERPL-MCNC: 51 MG/DL (ref 40–60)
HGB BLD-MCNC: 15.5 G/DL (ref 13–17.7)
IMM GRANULOCYTES # BLD AUTO: 0.02 10*3/MM3 (ref 0–0.05)
IMM GRANULOCYTES NFR BLD AUTO: 0.4 % (ref 0–0.5)
LDLC SERPL CALC-MCNC: 67 MG/DL (ref 0–100)
LDLC/HDLC SERPL: 1.24 {RATIO}
LYMPHOCYTES # BLD AUTO: 0.63 10*3/MM3 (ref 0.7–3.1)
LYMPHOCYTES NFR BLD AUTO: 13.7 % (ref 19.6–45.3)
MCH RBC QN AUTO: 32.6 PG (ref 26.6–33)
MCHC RBC AUTO-ENTMCNC: 35.2 G/DL (ref 31.5–35.7)
MCV RBC AUTO: 92.6 FL (ref 79–97)
MONOCYTES # BLD AUTO: 0.51 10*3/MM3 (ref 0.1–0.9)
MONOCYTES NFR BLD AUTO: 11.1 % (ref 5–12)
NEUTROPHILS NFR BLD AUTO: 3.29 10*3/MM3 (ref 1.7–7)
NEUTROPHILS NFR BLD AUTO: 71.5 % (ref 42.7–76)
NRBC BLD AUTO-RTO: 0 /100 WBC (ref 0–0.2)
PLATELET # BLD AUTO: 184 10*3/MM3 (ref 140–450)
PMV BLD AUTO: 10.3 FL (ref 6–12)
POTASSIUM SERPL-SCNC: 4.4 MMOL/L (ref 3.5–5.2)
PROT SERPL-MCNC: 7.3 G/DL (ref 6–8.5)
PSA SERPL-MCNC: 1.7 NG/ML (ref 0–4)
RBC # BLD AUTO: 4.75 10*6/MM3 (ref 4.14–5.8)
SODIUM SERPL-SCNC: 138 MMOL/L (ref 136–145)
T4 FREE SERPL-MCNC: 1.17 NG/DL (ref 0.93–1.7)
TRIGL SERPL-MCNC: 143 MG/DL (ref 0–150)
TSH SERPL DL<=0.05 MIU/L-ACNC: 0.67 UIU/ML (ref 0.27–4.2)
VIT B12 BLD-MCNC: 869 PG/ML (ref 211–946)
VLDLC SERPL-MCNC: 25 MG/DL (ref 5–40)
WBC NRBC COR # BLD: 4.6 10*3/MM3 (ref 3.4–10.8)

## 2023-03-27 PROCEDURE — 80053 COMPREHEN METABOLIC PANEL: CPT

## 2023-03-27 PROCEDURE — 84443 ASSAY THYROID STIM HORMONE: CPT

## 2023-03-27 PROCEDURE — 85025 COMPLETE CBC W/AUTO DIFF WBC: CPT

## 2023-03-27 PROCEDURE — 84439 ASSAY OF FREE THYROXINE: CPT

## 2023-03-27 PROCEDURE — 84403 ASSAY OF TOTAL TESTOSTERONE: CPT

## 2023-03-27 PROCEDURE — 82607 VITAMIN B-12: CPT

## 2023-03-27 PROCEDURE — 80061 LIPID PANEL: CPT

## 2023-03-27 PROCEDURE — 85652 RBC SED RATE AUTOMATED: CPT

## 2023-03-27 PROCEDURE — 84402 ASSAY OF FREE TESTOSTERONE: CPT

## 2023-03-27 PROCEDURE — 84153 ASSAY OF PSA TOTAL: CPT

## 2023-03-27 PROCEDURE — 36415 COLL VENOUS BLD VENIPUNCTURE: CPT

## 2023-03-27 PROCEDURE — 82746 ASSAY OF FOLIC ACID SERUM: CPT

## 2023-03-27 PROCEDURE — 83036 HEMOGLOBIN GLYCOSYLATED A1C: CPT

## 2023-03-28 ENCOUNTER — OFFICE VISIT (OUTPATIENT)
Dept: INTERNAL MEDICINE | Facility: CLINIC | Age: 68
End: 2023-03-28
Payer: MEDICARE

## 2023-03-28 VITALS
HEART RATE: 74 BPM | TEMPERATURE: 98.4 F | BODY MASS INDEX: 39.34 KG/M2 | DIASTOLIC BLOOD PRESSURE: 96 MMHG | WEIGHT: 244.8 LBS | OXYGEN SATURATION: 93 % | SYSTOLIC BLOOD PRESSURE: 150 MMHG | HEIGHT: 66 IN

## 2023-03-28 DIAGNOSIS — E78.2 MIXED HYPERLIPIDEMIA: ICD-10-CM

## 2023-03-28 DIAGNOSIS — D70.9 NEUTROPENIA, UNSPECIFIED TYPE: ICD-10-CM

## 2023-03-28 DIAGNOSIS — I10 HYPERTENSION, ESSENTIAL: ICD-10-CM

## 2023-03-28 DIAGNOSIS — E66.09 CLASS 2 OBESITY DUE TO EXCESS CALORIES WITHOUT SERIOUS COMORBIDITY WITH BODY MASS INDEX (BMI) OF 39.0 TO 39.9 IN ADULT: ICD-10-CM

## 2023-03-28 DIAGNOSIS — R73.01 IFG (IMPAIRED FASTING GLUCOSE): ICD-10-CM

## 2023-03-28 DIAGNOSIS — Z00.00 ENCOUNTER FOR SUBSEQUENT ANNUAL WELLNESS VISIT (AWV) IN MEDICARE PATIENT: Primary | ICD-10-CM

## 2023-03-28 DIAGNOSIS — F33.1 MAJOR DEPRESSIVE DISORDER, RECURRENT, MODERATE: ICD-10-CM

## 2023-03-28 DIAGNOSIS — G45.9 TRANSIENT ISCHEMIC ATTACK: ICD-10-CM

## 2023-03-28 DIAGNOSIS — E29.1 HYPOGONADISM IN MALE: ICD-10-CM

## 2023-03-28 RX ORDER — BUPROPION HYDROCHLORIDE 300 MG/1
300 TABLET ORAL DAILY
Qty: 90 TABLET | Refills: 3 | Status: SHIPPED | OUTPATIENT
Start: 2023-03-28

## 2023-03-28 NOTE — PROGRESS NOTES
"CHIEF COMPLAINT/ HPI: Patient is here to follow-up with blood pressure, cholesterol issues lab work reviewed, he recently had seen his hematologist in [place] couple months ago and got a 6-month follow-up, says he staying active busy,  Medicare Wellness-subsequent              Objective   Vital Signs  Vitals:    03/28/23 1145   BP: 150/96   Pulse: 74   Temp: 98.4 °F (36.9 °C)   SpO2: 93%   Weight: 111 kg (244 lb 12.8 oz)   Height: 167.6 cm (65.98\")      Body mass index is 39.53 kg/m².  Review of Systems   Constitutional: Negative.    HENT: Negative.    Eyes: Negative.    Respiratory: Negative.    Cardiovascular: Negative.    Gastrointestinal: Negative.    Endocrine: Negative.    Genitourinary: Negative.    Musculoskeletal: Negative.    Allergic/Immunologic: Negative.    Neurological: Negative.    Hematological: Negative.    Psychiatric/Behavioral: Negative.       Physical Exam  Constitutional:       General: He is not in acute distress.     Appearance: Normal appearance.   HENT:      Head: Normocephalic and atraumatic.      Right Ear: Tympanic membrane and ear canal normal.      Left Ear: Tympanic membrane and ear canal normal.      Mouth/Throat:      Mouth: Mucous membranes are moist.      Pharynx: Oropharynx is clear.   Eyes:      General: No scleral icterus.     Extraocular Movements: Extraocular movements intact.      Conjunctiva/sclera: Conjunctivae normal.      Pupils: Pupils are equal, round, and reactive to light.   Neck:      Vascular: No carotid bruit.   Cardiovascular:      Rate and Rhythm: Normal rate and regular rhythm.      Pulses: Normal pulses.      Heart sounds: Normal heart sounds. No murmur heard.    No gallop.   Pulmonary:      Effort: Pulmonary effort is normal. No respiratory distress.      Breath sounds: Normal breath sounds. No wheezing.   Abdominal:      General: Bowel sounds are normal. There is no distension.      Palpations: Abdomen is soft. There is no mass.      Tenderness: There is " no abdominal tenderness. There is no guarding.   Musculoskeletal:         General: No swelling or tenderness. Normal range of motion.      Cervical back: Normal range of motion and neck supple. No tenderness.      Right lower leg: No edema.      Left lower leg: No edema.   Lymphadenopathy:      Cervical: No cervical adenopathy.   Skin:     General: Skin is warm and dry.      Coloration: Skin is not jaundiced.      Findings: No rash.   Neurological:      General: No focal deficit present.      Mental Status: He is alert and oriented to person, place, and time.      Motor: No weakness.      Coordination: Coordination normal.      Gait: Gait normal.   Psychiatric:         Mood and Affect: Mood normal.         Behavior: Behavior normal.         Thought Content: Thought content normal.         Judgment: Judgment normal.        Result Review :   No results found for: PROBNP, BNP  CMP    CMP 9/29/22 1/5/23 3/27/23   Glucose 116 (A) 99 99   BUN 16 20 14   Creatinine 1.01 1.10 1.07   eGFR 81.5 73.6 76.1   Sodium 137 138 138   Potassium 4.6 5.1 4.4   Chloride 103 101 102   Calcium 8.8 10.0 10.1   Total Protein 6.5 7.3 7.3   Albumin 4.30 4.6 4.8   Globulin 2.2 2.7 2.5   Total Bilirubin 0.3 0.5 0.6   Alkaline Phosphatase 55 60 56   AST (SGOT) 24 28 31   ALT (SGPT) 29 29 33   Albumin/Globulin Ratio 2.0 1.7 1.9   BUN/Creatinine Ratio 15.8 18.2 13.1   Anion Gap 11.3 11.4 11.1   (A) Abnormal value       Comments are available for some flowsheets but are not being displayed.           CBC w/diff    CBC w/Diff 9/29/22 1/5/23 3/27/23   WBC 4.94 5.20 4.60   RBC 4.28 4.66 4.75   Hemoglobin 13.7 14.8 15.5   Hematocrit 38.4 42.7 44.0   MCV 89.7 91.6 92.6   MCH 32.0 31.8 32.6   MCHC 35.7 34.7 35.2   RDW 12.7 14.1 13.1   Platelets 182 203 184   Neutrophil Rel % 73.1 69.1 71.5   Immature Granulocyte Rel % 0.6 (A) 0.6 (A) 0.4   Lymphocyte Rel % 9.3 (A) 13.5 (A) 13.7 (A)   Monocyte Rel % 11.1 12.7 (A) 11.1   Eosinophil Rel % 5.1 3.5 2.4    Basophil Rel % 0.8 0.6 0.9   (A) Abnormal value             Lipid Panel    Lipid Panel 9/29/22 3/27/23   Total Cholesterol 142 143   Triglycerides 187 (A) 143   HDL Cholesterol 59 51   VLDL Cholesterol 30 25   LDL Cholesterol  53 67   LDL/HDL Ratio 0.77 1.24   (A) Abnormal value             Lab Results   Component Value Date    TSH 0.671 03/27/2023    TSH 0.847 01/05/2023    TSH 1.160 03/29/2022      Lab Results   Component Value Date    FREET4 1.17 03/27/2023    FREET4 1.15 01/05/2023    FREET4 1.29 03/29/2022      A1C Last 3 Results    HGBA1C Last 3 Results 3/27/23   Hemoglobin A1C 5.60            PSA    PSA 7/25/22 1/5/23 3/27/23   PSA 1.780 1.510 1.700                          Visit Diagnoses:    ICD-10-CM ICD-9-CM   1. Hypogonadism in male  E29.1 257.2   2. Transient ischemic attack  G45.9 435.9   3. Hypertension, essential  I10 401.9   4. Neutropenia, unspecified type (HCC)  D70.9 288.00   5. Mixed hyperlipidemia  E78.2 272.2   6. Class 2 obesity due to excess calories without serious comorbidity with body mass index (BMI) of 39.0 to 39.9 in adult  E66.09 278.00    Z68.39 V85.39   7. Major depressive disorder, recurrent, moderate (HCC)  F33.1 296.32   8. IFG (impaired fasting glucose)  R73.01 790.21       Assessment and Plan   Diagnoses and all orders for this visit:    1. Hypogonadism in male (Primary)  -     Comprehensive Metabolic Panel; Future  -     CBC & Differential; Future  -     Hemoglobin A1c; Future  -     buPROPion XL (Wellbutrin XL) 300 MG 24 hr tablet; Take 1 tablet by mouth Daily.  Dispense: 90 tablet; Refill: 3  -     Testosterone, Free, Total; Future    2. Transient ischemic attack  -     Comprehensive Metabolic Panel; Future  -     CBC & Differential; Future  -     Hemoglobin A1c; Future  -     buPROPion XL (Wellbutrin XL) 300 MG 24 hr tablet; Take 1 tablet by mouth Daily.  Dispense: 90 tablet; Refill: 3  -     Testosterone, Free, Total; Future    3. Hypertension, essential  -      Comprehensive Metabolic Panel; Future  -     CBC & Differential; Future  -     Hemoglobin A1c; Future  -     buPROPion XL (Wellbutrin XL) 300 MG 24 hr tablet; Take 1 tablet by mouth Daily.  Dispense: 90 tablet; Refill: 3  -     Testosterone, Free, Total; Future    4. Neutropenia, unspecified type (HCC)  -     Comprehensive Metabolic Panel; Future  -     CBC & Differential; Future  -     Hemoglobin A1c; Future  -     buPROPion XL (Wellbutrin XL) 300 MG 24 hr tablet; Take 1 tablet by mouth Daily.  Dispense: 90 tablet; Refill: 3  -     Testosterone, Free, Total; Future    5. Mixed hyperlipidemia  -     Comprehensive Metabolic Panel; Future  -     CBC & Differential; Future  -     Hemoglobin A1c; Future  -     buPROPion XL (Wellbutrin XL) 300 MG 24 hr tablet; Take 1 tablet by mouth Daily.  Dispense: 90 tablet; Refill: 3  -     Testosterone, Free, Total; Future    6. Class 2 obesity due to excess calories without serious comorbidity with body mass index (BMI) of 39.0 to 39.9 in adult  -     Comprehensive Metabolic Panel; Future  -     CBC & Differential; Future  -     Hemoglobin A1c; Future  -     buPROPion XL (Wellbutrin XL) 300 MG 24 hr tablet; Take 1 tablet by mouth Daily.  Dispense: 90 tablet; Refill: 3  -     Testosterone, Free, Total; Future    7. Major depressive disorder, recurrent, moderate (HCC)  -     Comprehensive Metabolic Panel; Future  -     CBC & Differential; Future  -     Hemoglobin A1c; Future  -     buPROPion XL (Wellbutrin XL) 300 MG 24 hr tablet; Take 1 tablet by mouth Daily.  Dispense: 90 tablet; Refill: 3  -     Testosterone, Free, Total; Future    8. IFG (impaired fasting glucose)  -     Comprehensive Metabolic Panel; Future  -     CBC & Differential; Future  -     Hemoglobin A1c; Future  -     buPROPion XL (Wellbutrin XL) 300 MG 24 hr tablet; Take 1 tablet by mouth Daily.  Dispense: 90 tablet; Refill: 3  -     Testosterone, Free, Total; Future             Hypogonadism,--started Depo testosterone  injections every 2 weeks 200 mg/mL, seems to be tolerating well, getting injections at our office we will check testosterone levels at next visit,    HA--//Neck pain with radiculopathy---, June 2018   results reviewed, no significant spinal pathology, no stroke on MRI of the brain, right maxillary sinus polyp, July 2018,---Headaches,, occipital neuralgia, treatment options discussed with muscle relaxers, doxepin, etc.--work-up in the ER including a CT of the brain did not show any acute findings LP was not performed, patient's clinical symptoms are improving daily as of July 15, 2022    Hairy cell leukemia, status posttreatment Uma 15, 2022--with IV cladribine infusion for 7 days, Uma 15, 2022,-leukopenia, -------continued slow drop over the past 2 to 3 years since 2019, discussed with patient March 30, 2022 -- patient ended up going up to [place] Dr. Addison hairy cell leukemia diagnosis, currently monitoring without aggressive treatment, September 2022,, and December 7, 2022, and March 28, 2023,    PSA 1.7 March 27, 2023    umbilical hernia --- did see Adonay Sainz September 2021--wants to lose wgt first--- march 2022    Mild reactive depression, --cont Celexa 20 mg  Qd , Wellbutrin  MG QD  --consider adjustment to dose, consider increasing to 300 daily, discussed March 20, 2023,    Polycythemia - much improved off testosterone, September 2022    HTN -  Cont Quinapril 40 mg bid,, doxazosin 4 mg daily at bedtime, spironolactone 25 mg daily---workup to include plasma metanephrine, renin, aldosterone and chemistry panels, August 2018---  All  normal--    LACUNAR INFARCT L BASAL GANGLIA, OLD , ON CT BRAIN JUNE 2018, -- echocardiogram,Normal ejection fraction otherwise unremarkable July 2018 carotid ultrasound, No significant stenosis July 2018,, Continue high-dose moderate dose statin therapy along with aspirin 81 mg daily, recommendations are to stop NSAIDs and go back to arthritis strength  Tylenol to help with blood pressure control and lower risk of stroke etc.,    S/P LAP CHOLEY MAY 2019, HARLEY ----discussed postcholecystectomy diarrhea and--treatment options, Colestid March 2022    Jc, on cpap, 2019 , --    r shoulder mass, r/o lipoma--DID SEE ORTHO     elevated chol --cont -ROUSAVASTATIN 10 MG QD ,      IFG, hemoglobin A1c 5.6 March 27, 2023 we will follow,    C ALEXANDRU 2013HARLEY ,Repeat colonoscopy September 2019, , Colonoscopy noted, one small tubular adenoma, consider repeat colonoscopy in the next year or 2, discussed December 2022          Follow Up   No follow-ups on file.  Patient was given instructions and counseling regarding his condition or for health maintenance advice. Please see specific information pulled into the AVS if appropriate.

## 2023-03-28 NOTE — PROGRESS NOTES
The ABCs of the Annual Wellness Visit  Subsequent Medicare Wellness Visit    Subjective      Dung Burch is a 67 y.o. male who presents for a Subsequent Medicare Wellness Visit.    The following portions of the patient's history were reviewed and   updated as appropriate: allergies, current medications, past family history, past medical history, past social history, past surgical history and problem list.    Compared to one year ago, the patient feels his physical   health is better.    Compared to one year ago, the patient feels his mental   health is better.    Recent Hospitalizations:  He was not admitted to the hospital during the last year.       Current Medical Providers:  Patient Care Team:  Isaac Matta MD as PCP - General (Internal Medicine)  Dione Nayak MD PhD as Consulting Physician (Hematology and Oncology)    Outpatient Medications Prior to Visit   Medication Sig Dispense Refill   • aspirin 81 MG chewable tablet aspirin 81 mg oral tablet,chewable chew 1 tablet (81 mg) by oral route once daily   Active     • cholecalciferol (VITAMIN D3) 25 MCG (1000 UT) tablet      • citalopram (CeleXA) 20 MG tablet Take 1 tablet by mouth Daily. 90 tablet 1   • loratadine (CLARITIN) 10 MG tablet loratadine 10 mg oral tablet take 1 tablet (10 mg) by oral route once daily   Active     • quinapril (ACCUPRIL) 40 MG tablet TAKE 1 TABLET BY MOUTH TWO TIMES A DAY (Patient taking differently: Take 1 tablet by mouth Daily.) 180 tablet 0   • rosuvastatin (CRESTOR) 10 MG tablet TAKE 1 TABLET BY MOUTH EVERY DAY 90 tablet 3   • spironolactone (ALDACTONE) 25 MG tablet TAKE 1 TABLET BY MOUTH EVERY DAY 90 tablet 3   • Testosterone Cypionate (Depo-Testosterone) 200 MG/ML injection Inject 1 mL into the appropriate muscle as directed by prescriber Every 14 (Fourteen) Days. 2 mL 5   • ciprofloxacin (Cipro) 500 MG tablet Take 1 tablet by mouth 2 (Two) Times a Day. 28 tablet 0   • doxycycline (VIBRAMYCIN) 100 MG capsule  Take 1 capsule by mouth 2 (Two) Times a Day. 20 capsule 0   • methylPREDNISolone (MEDROL) 4 MG dose pack Take as directed on package instructions. 21 tablet 0     Facility-Administered Medications Prior to Visit   Medication Dose Route Frequency Provider Last Rate Last Admin   • Testosterone Cypionate (DEPOTESTOTERONE CYPIONATE) injection 200 mg  200 mg Intramuscular Q14 Days Isaac Matta MD   200 mg at 03/16/23 1233       No opioid medication identified on active medication list. I have reviewed chart for other potential  high risk medication/s and harmful drug interactions in the elderly.          Aspirin is on active medication list. Aspirin use is indicated based on review of current medical condition/s. Pros and cons of this therapy have been discussed today. Benefits of this medication outweigh potential harm.  Patient has been encouraged to continue taking this medication.  .      Patient Active Problem List   Diagnosis   • Encounter for subsequent annual wellness visit (AWV) in Medicare patient   • Class 2 obesity due to excess calories without serious comorbidity with body mass index (BMI) of 39.0 to 39.9 in adult   • IFG (impaired fasting glucose)   • Hypertension, essential   • Mixed hyperlipidemia   • Cerebrovascular accident (CVA) due to thrombosis of left middle cerebral artery (HCC)   • SHANI on CPAP   • Umbilical hernia without obstruction and without gangrene   • Leukopenia   • Cervical root syndrome   • Contusion   • Hypertonicity of bladder   • Kidney stones   • Arthritis   • Pure hypercholesterolemia   • Transient ischemic attack   • Urgency incontinence   • Urgency of urination   • Vitamin D deficiency   • Hairy cell leukemia not having achieved remission ([facility])   • Bleeding nose   • Other fatigue   • Bilateral headaches   • Fever   • Acute prostatitis   • Hypogonadism in male   • Elevated PSA   • RUQ abdominal pain   • Major depressive disorder, recurrent, moderate ([facility])     Advance  "Care Planning  Advance Directive is not on file.  ACP discussion was held with the patient during this visit. Patient has an advance directive (not in EMR), copy requested.     Objective    Vitals:    03/28/23 1145   BP: 150/96   Pulse: 74   Temp: 98.4 °F (36.9 °C)   SpO2: 93%   Weight: 111 kg (244 lb 12.8 oz)   Height: 167.6 cm (65.98\")     Estimated body mass index is 39.53 kg/m² as calculated from the following:    Height as of this encounter: 167.6 cm (65.98\").    Weight as of this encounter: 111 kg (244 lb 12.8 oz).    Class 2 Severe Obesity (BMI >=35 and <=39.9). Obesity-related health conditions include the following: obstructive sleep apnea, hypertension and dyslipidemias. Obesity is worsening. BMI is is above average; BMI management plan is completed. We discussed portion control and increasing exercise.      Does the patient have evidence of cognitive impairment?   No    Lab Results   Component Value Date    TRIG 143 03/27/2023    HDL 51 03/27/2023    LDL 67 03/27/2023    VLDL 25 03/27/2023    HGBA1C 5.60 03/27/2023          HEALTH RISK ASSESSMENT    Smoking Status:  Social History     Tobacco Use   Smoking Status Never   • Passive exposure: Never   Smokeless Tobacco Never     Alcohol Consumption:  Social History     Substance and Sexual Activity   Alcohol Use Yes   • Alcohol/week: 3.0 standard drinks   • Types: 3 Cans of beer per week    Comment: 3 per day     Fall Risk Screen:    MADELEINEADI Fall Risk Assessment was completed, and patient is at LOW risk for falls.Assessment completed on:3/28/2023    Depression Screening:  PHQ-2/PHQ-9 Depression Screening 3/28/2023   Little Interest or Pleasure in Doing Things 0-->not at all   Feeling Down, Depressed or Hopeless 0-->not at all   PHQ-9: Brief Depression Severity Measure Score 0       Health Habits and Functional and Cognitive Screening:  Functional & Cognitive Status 3/28/2023   Do you have difficulty preparing food and eating? No   Do you have difficulty " bathing yourself, getting dressed or grooming yourself? No   Do you have difficulty using the toilet? No   Do you have difficulty moving around from place to place? No   Do you have trouble with steps or getting out of a bed or a chair? No   Current Diet Limited Junk Food   Dental Exam Up to date   Eye Exam Up to date   Exercise (times per week) 0 times per week   Do you need help using the phone?  No   Are you deaf or do you have serious difficulty hearing?  No   Do you need help with transportation? No   Do you need help shopping? No   Do you need help preparing meals?  No   Do you need help with housework?  No   Do you need help with laundry? No   Do you need help taking your medications? No   Do you need help managing money? No   Do you ever drive or ride in a car without wearing a seat belt? No   Do you have difficulty concentrating, remembering or making decisions? No       Age-appropriate Screening Schedule:  Refer to the list below for future screening recommendations based on patient's age, sex and/or medical conditions. Orders for these recommended tests are listed in the plan section. The patient has been provided with a written plan.    Health Maintenance   Topic Date Due   • TDAP/TD VACCINES (1 - Tdap) Never done   • ZOSTER VACCINE (1 of 2) Never done   • Pneumococcal Vaccine 65+ (2 - PCV) 11/27/2019   • COVID-19 Vaccine (3 - Moderna risk series) 05/07/2021   • INFLUENZA VACCINE  08/01/2022   • LIPID PANEL  03/27/2024   • ANNUAL WELLNESS VISIT  03/28/2024   • COLORECTAL CANCER SCREENING  12/08/2031   • HEPATITIS C SCREENING  Completed                CMS Preventative Services Quick Reference  Risk Factors Identified During Encounter:    None Identified    The above risks/problems have been discussed with the patient.  Pertinent information has been shared with the patient in the After Visit Summary.    Diagnoses and all orders for this visit:    1. Encounter for subsequent annual wellness visit (AWV) in  Medicare patient (Primary)    2. Hypogonadism in male  -     Comprehensive Metabolic Panel; Future  -     CBC & Differential; Future  -     Hemoglobin A1c; Future  -     buPROPion XL (Wellbutrin XL) 300 MG 24 hr tablet; Take 1 tablet by mouth Daily.  Dispense: 90 tablet; Refill: 3  -     Testosterone, Free, Total; Future    3. Transient ischemic attack  -     Comprehensive Metabolic Panel; Future  -     CBC & Differential; Future  -     Hemoglobin A1c; Future  -     buPROPion XL (Wellbutrin XL) 300 MG 24 hr tablet; Take 1 tablet by mouth Daily.  Dispense: 90 tablet; Refill: 3  -     Testosterone, Free, Total; Future    4. Hypertension, essential  -     Comprehensive Metabolic Panel; Future  -     CBC & Differential; Future  -     Hemoglobin A1c; Future  -     buPROPion XL (Wellbutrin XL) 300 MG 24 hr tablet; Take 1 tablet by mouth Daily.  Dispense: 90 tablet; Refill: 3  -     Testosterone, Free, Total; Future    5. Neutropenia, unspecified type (HCC)  -     Comprehensive Metabolic Panel; Future  -     CBC & Differential; Future  -     Hemoglobin A1c; Future  -     buPROPion XL (Wellbutrin XL) 300 MG 24 hr tablet; Take 1 tablet by mouth Daily.  Dispense: 90 tablet; Refill: 3  -     Testosterone, Free, Total; Future    6. Mixed hyperlipidemia  -     Comprehensive Metabolic Panel; Future  -     CBC & Differential; Future  -     Hemoglobin A1c; Future  -     buPROPion XL (Wellbutrin XL) 300 MG 24 hr tablet; Take 1 tablet by mouth Daily.  Dispense: 90 tablet; Refill: 3  -     Testosterone, Free, Total; Future    7. Class 2 obesity due to excess calories without serious comorbidity with body mass index (BMI) of 39.0 to 39.9 in adult  -     Comprehensive Metabolic Panel; Future  -     CBC & Differential; Future  -     Hemoglobin A1c; Future  -     buPROPion XL (Wellbutrin XL) 300 MG 24 hr tablet; Take 1 tablet by mouth Daily.  Dispense: 90 tablet; Refill: 3  -     Testosterone, Free, Total; Future    8. Major depressive  disorder, recurrent, moderate (HCC)  -     Comprehensive Metabolic Panel; Future  -     CBC & Differential; Future  -     Hemoglobin A1c; Future  -     buPROPion XL (Wellbutrin XL) 300 MG 24 hr tablet; Take 1 tablet by mouth Daily.  Dispense: 90 tablet; Refill: 3  -     Testosterone, Free, Total; Future    9. IFG (impaired fasting glucose)  -     Comprehensive Metabolic Panel; Future  -     CBC & Differential; Future  -     Hemoglobin A1c; Future  -     buPROPion XL (Wellbutrin XL) 300 MG 24 hr tablet; Take 1 tablet by mouth Daily.  Dispense: 90 tablet; Refill: 3  -     Testosterone, Free, Total; Future        Follow Up:   Next Medicare Wellness visit to be scheduled in 1 year.      An After Visit Summary and PPPS were made available to the patient.

## 2023-03-30 ENCOUNTER — CLINICAL SUPPORT (OUTPATIENT)
Dept: INTERNAL MEDICINE | Facility: CLINIC | Age: 68
End: 2023-03-30
Payer: MEDICARE

## 2023-03-30 DIAGNOSIS — E29.1 HYPOGONADISM IN MALE: Primary | ICD-10-CM

## 2023-03-30 RX ORDER — TESTOSTERONE CYPIONATE 200 MG/ML
200 INJECTION, SOLUTION INTRAMUSCULAR
Status: SHIPPED | OUTPATIENT
Start: 2023-03-30 | End: 2037-03-26

## 2023-03-30 RX ADMIN — TESTOSTERONE CYPIONATE 200 MG: 200 INJECTION, SOLUTION INTRAMUSCULAR at 10:42

## 2023-03-31 LAB
TESTOST FREE SERPL-MCNC: 9.4 PG/ML (ref 6.6–18.1)
TESTOST SERPL-MCNC: 364 NG/DL (ref 264–916)

## 2023-04-13 ENCOUNTER — CLINICAL SUPPORT (OUTPATIENT)
Dept: INTERNAL MEDICINE | Facility: CLINIC | Age: 68
End: 2023-04-13
Payer: MEDICARE

## 2023-04-13 RX ADMIN — TESTOSTERONE CYPIONATE 200 MG: 200 INJECTION, SOLUTION INTRAMUSCULAR at 09:49

## 2023-04-27 ENCOUNTER — TELEPHONE (OUTPATIENT)
Dept: INTERNAL MEDICINE | Facility: CLINIC | Age: 68
End: 2023-04-27

## 2023-04-27 ENCOUNTER — CLINICAL SUPPORT (OUTPATIENT)
Dept: INTERNAL MEDICINE | Facility: CLINIC | Age: 68
End: 2023-04-27
Payer: MEDICARE

## 2023-04-27 PROCEDURE — 96372 THER/PROPH/DIAG INJ SC/IM: CPT | Performed by: INTERNAL MEDICINE

## 2023-04-27 RX ORDER — LISINOPRIL 40 MG/1
40 TABLET ORAL DAILY
Qty: 30 TABLET | Refills: 0 | Status: SHIPPED | OUTPATIENT
Start: 2023-04-27

## 2023-04-27 RX ADMIN — TESTOSTERONE CYPIONATE 200 MG: 200 INJECTION, SOLUTION INTRAMUSCULAR at 09:28

## 2023-04-27 NOTE — TELEPHONE ENCOUNTER
Patient is out of his quinapril 40mg and the pharmacy cant get this medication. Is there something else we can change this to?

## 2023-04-27 NOTE — TELEPHONE ENCOUNTER
If he is out of his quinipril, we can start him on lisinopril 40 mg daily. Have him monitor his BP at home, keep a log and call Monday or Tuesday with the readings to see if we need to make further adjustments. Send that in to his pharmacy please #[phone] no refills.

## 2023-05-12 ENCOUNTER — LAB (OUTPATIENT)
Dept: INTERNAL MEDICINE | Facility: CLINIC | Age: 68
End: 2023-05-12
Payer: MEDICARE

## 2023-05-12 RX ADMIN — TESTOSTERONE CYPIONATE 200 MG: 200 INJECTION, SOLUTION INTRAMUSCULAR at 13:46

## 2023-05-25 RX ORDER — LISINOPRIL 40 MG/1
TABLET ORAL
Qty: 90 TABLET | Refills: 1 | Status: SHIPPED | OUTPATIENT
Start: 2023-05-25

## 2023-06-09 ENCOUNTER — CLINICAL SUPPORT (OUTPATIENT)
Dept: INTERNAL MEDICINE | Facility: CLINIC | Age: 68
End: 2023-06-09
Payer: MEDICARE

## 2023-06-09 RX ADMIN — TESTOSTERONE CYPIONATE 200 MG: 200 INJECTION, SOLUTION INTRAMUSCULAR at 11:23

## 2023-07-07 ENCOUNTER — TELEPHONE (OUTPATIENT)
Dept: INTERNAL MEDICINE | Facility: CLINIC | Age: 68
End: 2023-07-07

## 2023-07-07 NOTE — TELEPHONE ENCOUNTER
Patient is looking into an anti-aging providers in [place],  Dr. Martinez.  Do you know anything about this or this clinic?  [phone]

## 2023-07-25 ENCOUNTER — CLINICAL SUPPORT (OUTPATIENT)
Dept: INTERNAL MEDICINE | Facility: CLINIC | Age: 68
End: 2023-07-25
Payer: MEDICARE

## 2023-07-25 PROCEDURE — 96372 THER/PROPH/DIAG INJ SC/IM: CPT | Performed by: INTERNAL MEDICINE

## 2023-07-25 RX ADMIN — TESTOSTERONE CYPIONATE 200 MG: 200 INJECTION, SOLUTION INTRAMUSCULAR at 10:54

## 2023-07-28 ENCOUNTER — HOSPITAL ENCOUNTER (OUTPATIENT)
Dept: ULTRASOUND IMAGING | Facility: HOSPITAL | Age: 68
Discharge: HOME | End: 2023-07-28
Admitting: INTERNAL MEDICINE
Payer: MEDICARE

## 2023-07-28 DIAGNOSIS — E29.1 HYPOGONADISM IN MALE: ICD-10-CM

## 2023-07-28 DIAGNOSIS — E55.9 VITAMIN D DEFICIENCY: ICD-10-CM

## 2023-07-28 DIAGNOSIS — C91.40 HAIRY CELL LEUKEMIA NOT HAVING ACHIEVED REMISSION: ICD-10-CM

## 2023-07-28 DIAGNOSIS — R53.83 OTHER FATIGUE: ICD-10-CM

## 2023-07-28 PROCEDURE — 76775 US EXAM ABDO BACK WALL LIM: CPT

## 2023-08-03 DIAGNOSIS — E29.1 HYPOGONADISM IN MALE: ICD-10-CM

## 2023-08-03 RX ORDER — TESTOSTERONE CYPIONATE 200 MG/ML
INJECTION, SOLUTION INTRAMUSCULAR
Qty: 2 ML | Refills: 5 | Status: SHIPPED | OUTPATIENT
Start: 2023-08-03

## 2023-08-04 ENCOUNTER — CLINICAL SUPPORT (OUTPATIENT)
Dept: INTERNAL MEDICINE | Facility: CLINIC | Age: 68
End: 2023-08-04
Payer: MEDICARE

## 2023-08-04 DIAGNOSIS — E29.1 HYPOGONADISM IN MALE: Primary | ICD-10-CM

## 2023-08-04 RX ADMIN — TESTOSTERONE CYPIONATE 200 MG: 200 INJECTION, SOLUTION INTRAMUSCULAR at 10:02

## 2023-08-31 NOTE — PROGRESS NOTES
"Chief Complaint  Establish Care (Patient is in the office today for a new patient visit to be seen for hypogonadism. )      HPI:  Dung Burch presents to 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 After chemo had no energy. Had testosterone, told low T, was told to have PCP prescribe rx, started injections every 2 weeks.     +SHANI- on CPAP, uses regular  +fatigue issues- still needing to nap in afternoons  Weight- normal for him (gradually increased)  No head injuries  Mumps as child  Has 2 kids - ages 38 and 33  No ED  Libido ok  Fatigue has been main issue.   Denies other supplement use- planning to start supplements from anti-aging clinic      Currently taking testosterone 200 mg every 2 weeks  Injecting at home  Last injection 2 days ago  Had missed a week so   Prior to that about Aug 9  Prior to that July 26    Rx has been for 200 mg every 2 weeks.   On this since earlier 2023      Medical Records Reviewed:  Printed labs with patient 7/26/23  Done at [facility]  A1C 5.8  Glc 124  TT 1265- BEFORE the injection he was due for, he thinks  Free T 29.5  LH <0.3  FSH 0.4  DHEA-S 98.9  SHBG 23.8  Estradiol 54.3  Insulin 50.2  Vit D 71.5  PSA 1.9  TSH  FT4 1.28  Hgb 15.7  Hct 43.9       Latest Reference Range & Units 09/29/22 09:11 01/05/23 12:46 03/27/23 11:42 06/28/23 10:50   Glucose 65 - 99 mg/dL 116 (H) 99 99 103 (H)   (H): Data is abnormally high    Dr Matta note 9/29/22  Polycythemia differential diagnosis includes polycythemia vera versus testosterone replacement the patient is recently started November 2019, patient also on thyroid replacement therapy--- testosterone January 2021,, much improved off testosterone, September 2022       Latest " Reference Range & Units 07/16/19 08:10 11/05/19 13:17   Testosterone, Total 193 - 740 ng/dL 838 (H) 999 (H)   (H): Data is abnormally high   Latest Reference Range & Units 01/07/20 08:52 10/31/22 15:59 03/27/23 11:42 06/28/23 10:50   Testosterone, Total 264 - 916 ng/dL 264 223.8 (L) (E) 364  Pt states was not on testosterone 99 (L)   Testosterone, Free 6.6 - 18.1 pg/mL   9.4 3.5 (L)        Latest Reference Range & Units 03/27/23 11:42 06/28/23 10:50   TSH Baseline 0.270 - 4.200 uIU/mL 0.671 0.920   Free T4 0.93 - 1.70 ng/dL 1.17 1.26     Lab Results   Component Value Date    WBC 4.94 06/28/2023    HGB 16.7 06/28/2023    HCT 47.2 06/28/2023    MCV 91.8 06/28/2023     06/28/2023     Lab Results   Component Value Date    PSA 1.700 03/27/2023    PSA 1.510 01/05/2023    PSA 1.780 07/25/2022      Latest Reference Range & Units 12/29/20 09:47 09/14/21 08:57 03/29/22 10:48 03/27/23 11:42   Hemoglobin A1C 4.80 - 5.60 % 5.7 6.10 (H) 6.10 (H) 5.60   (H): Data is abnormally high      7/9/22 CT Head  Narrative & Impression   PROCEDURE:  CT HEAD WO CONTRAST     COMPARISON:  [facility], CT, HEAD W/WO CONTRAST, 6/06/2018, 11:38.     INDICATIONS:  Headache     PROTOCOL:     Standard imaging protocol performed                 RADIATION:      DLP: 1017.2 mGy*cm               MA and/or KV was adjusted to minimize radiation dose.                  TECHNIQUE:    CT images were obtained without non-ionic intravenous contrast material.      FINDINGS:          The ventricles are normal in size, position, and configuration.  Sulci are not abnormally   prominent.     No abnormal gray or white matter density is appreciated.     There is no CT evidence of acute intracranial hemorrhage, mass, or mass effect.     The orbits have a normal appearance.     The paranasal sinuses are well aerated.  A small air-fluid level is seen in the left sphenoid   sinus.  The middle ears and mastoid air cells are well aerated.     No  fractures are seen on bone window images.     IMPRESSION:               Negative CT scan of the head without IV contrast.          1/5/23  CT abd/pelvis  ABDOMEN:  There is mild scarring in the lung bases.  No renal calcifications are identified.  Prior   cholecystectomy.  There is cortical scarring in the left kidney with simple cysts measuring up to   3.8 cm.  The right kidney is normal.  There is fatty infiltration of the liver.  The spleen,   pancreas and left adrenal gland are normal.  Stable incidental right adrenal adenoma.     PELVIS:  No ureteral or urinary bladder calcifications are identified.  There are fat containing inguinal   hernias.  There is mild enlargement of the prostate gland with mild adjacent stranding.  No   evidence of bowel obstruction, perforation or abscess.  The appendix and terminal ileum are normal.    The urinary bladder has a normal appearance.  No acute osseous abnormalities are identified.    There is a small fat containing umbilical hernia.  The abdominal aorta has a normal caliber.    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  No CT evidence of colitis.     IMPRESSION:       1. Mild enlargement of the prostate gland with adjacent stranding.  Suggest correlation with any   history of prostatitis.     2. No evidence of urolithiasis or hydronephrosis.     3. Normal appendix.      Past Medical History:   Diagnosis Date    Cervical root disorders, not elsewhere classified     Essential (primary) hypertension     ETOH abuse     Hairy cell leukemia     High cholesterol     Impaired fasting glucose     Pure hypercholesterolemia     Sleep apnea, unspecified     Transient cerebral ischemic attack, unspecified     Vitamin D deficiency, unspecified      Past Surgical History:   Procedure Laterality Date    CHOLECYSTECTOMY  08/2019    ENDOSCOPY AND COLONOSCOPY  2008    KNEE SURGERY Right     OTHER SURGICAL HISTORY      TUBAL ADENOMA    OTHER SURGICAL HISTORY Left     CHEEK    THYROID  CYST EXCISION  2000     Family History   Problem Relation Age of Onset    Cancer Mother     Cancer Father     Hyperlipidemia Father     COPD Father     Cancer Paternal Grandfather      Social History     Socioeconomic History    Marital status:    Tobacco Use    Smoking status: Never     Passive exposure: Never    Smokeless tobacco: Never   Vaping Use    Vaping Use: Never used   Substance and Sexual Activity    Alcohol use: Yes     Alcohol/week: 3.0 standard drinks     Types: 3 Cans of beer per week     Comment: 3 per day    Drug use: Never    Sexual activity: Defer         ROS: The following systems were specifically reviewed with patient:constitutional, ophtho, ENT, CV, pulm, GI, , musculoskeletal, derm, neuro, psych, heme-onc, allergy, and endocrine (other than HPI). Positive findings were: fatigue      MEDICATIONS  Current Outpatient Medications   Medication Sig Dispense Refill    aspirin 81 MG chewable tablet aspirin 81 mg oral tablet,chewable chew 1 tablet (81 mg) by oral route once daily   Active      buPROPion XL (Wellbutrin XL) 300 MG 24 hr tablet Take 1 tablet by mouth Daily. 90 tablet 3    cholecalciferol (VITAMIN D3) 25 MCG (1000 UT) tablet       citalopram (CeleXA) 20 MG tablet Take 1 tablet by mouth Daily. 90 tablet 1    lisinopril (PRINIVIL,ZESTRIL) 40 MG tablet TAKE 1 TABLET BY MOUTH EVERY DAY 90 tablet 1    loratadine (CLARITIN) 10 MG tablet loratadine 10 mg oral tablet take 1 tablet (10 mg) by oral route once daily   Active      rosuvastatin (CRESTOR) 10 MG tablet TAKE 1 TABLET BY MOUTH EVERY DAY 90 tablet 3    spironolactone (ALDACTONE) 25 MG tablet TAKE 1 TABLET BY MOUTH EVERY DAY 90 tablet 3    Testosterone Cypionate (DEPOTESTOTERONE CYPIONATE) 200 MG/ML injection INJECT 1 ML SUBCUTANEOUSLY AS DIRECTED EVERY 2 WEEKS 2 mL 5     Current Facility-Administered Medications   Medication Dose Route Frequency Provider Last Rate Last Admin    Testosterone Cypionate (DEPOTESTOTERONE CYPIONATE)  "injection 200 mg  200 mg Intramuscular Q14 Days Isaac Matta MD   200 mg at 08/04/23 1002           Physical Exam:  Vital Signs:  /70 (BP Location: Right arm)   Pulse 76   Temp 97.5 øF (36.4 øC) (Temporal)   Ht 167.6 cm (65.98\")   Wt 107 kg (235 lb 9.6 oz)   SpO2 96%   BMI 38.05 kg/mý   Estimated body mass index is 38.05 kg/mý as calculated from the following:    Height as of this encounter: 167.6 cm (65.98\").    Weight as of this encounter: 107 kg (235 lb 9.6 oz).     General appearance - Obese pleasant WM, alert, well appearing, and in no distress  Mental status - affect appropriate to mood  Neck - No masses, no thyromegaly or nodules, well-healed transverse neck scar  Lymphatics - no palpable cervical lymphadenopathy  Chest - CTA, normal effort. No gynecomastia  Heart - normal rate and regular rhythm, no murmurs noted, no edema  Neurological - no tremors, normal gait  Skin - no acanthosis, no skin tags  Normal beard and body hair          Assessment and Plan   Diagnoses and all orders for this visit:    1. Male hypogonadism (Primary)  Comment:pt here with c/o hypogonadism  Main sx is fatigue  Normal libido and sexual function  Had low T on 10/22 lab but was drawn at 4pm- not diagnostic  11 am testosterone in March was normal  Plan:   -I d/w pt and wife at length that I'm not convinced he definitely has low T. He needs formal eval, off testosterone therapy, with 2 testosterone levels drawn between 8-9 am (and appropriate assessment of free T accounting for obesity effect on SHBG)  -Fatigue is not improved on testosterone and no issues with sexual function prior to rx.  Likely needs to pursue alternative etiology of fatigue  -stop testosterone.  Check TT/SHBG/albumin/LH/FSH/PRL fasting 8 am in 12 weeks (just did injection 2 days ago).  Repeat T levels with SHBG/albumin fasting a few days after that to confirm  -fu 4 months  -d/w pt and wife that IF T is low, then needs eval as to underlying " etiology, pituitary v. Gonadal  -if he needs to restart T rx, might benefit from topical rx with more consistent levels    -     TestT+TestF+SHBG; Future  -     FSH & LH; Future  -     Albumin; Future  -     Prolactin; Future  -     TestT+TestF+SHBG; Future  -     Albumin; Future    2. IFG (impaired fasting glucose)  Comment:underlying obesity  Plan: refer to DM center for DPP  Might benefit from wt loss/Wegovy but currently Wegovy shortage. Can revisit in future.    -     Ambulatory Referral to Diabetic Education              Plan of care reviewed with patient who verbalizes understanding and agrees.  Marni Day MD [facility]         I spent 65 minutes caring for Dung on this date of service. This time includes time spent by me in the following activities:preparing for the visit, reviewing tests, obtaining and/or reviewing a separately obtained history, performing a medically appropriate examination and/or evaluation , counseling and educating the patient/family/caregiver, ordering medications, tests, or procedures, referring and communicating with other health care professionals , documenting information in the medical record, and independently interpreting results and communicating that information with the patient/family/caregiver  Follow Up  Return in about 4 months (around 1/1/2024).  Patient was given instructions and counseling regarding his condition or for health maintenance advice. Please see specific information pulled into the AVS if appropriate.

## 2023-09-01 ENCOUNTER — OFFICE VISIT (OUTPATIENT)
Dept: ENDOCRINOLOGY | Age: 68
End: 2023-09-01
Payer: MEDICARE

## 2023-09-01 VITALS
DIASTOLIC BLOOD PRESSURE: 70 MMHG | BODY MASS INDEX: 37.86 KG/M2 | HEIGHT: 66 IN | TEMPERATURE: 97.5 F | OXYGEN SATURATION: 96 % | SYSTOLIC BLOOD PRESSURE: 120 MMHG | WEIGHT: 235.6 LBS | HEART RATE: 76 BPM

## 2023-09-01 DIAGNOSIS — E29.1 MALE HYPOGONADISM: Primary | ICD-10-CM

## 2023-09-01 DIAGNOSIS — R73.01 IFG (IMPAIRED FASTING GLUCOSE): ICD-10-CM

## 2023-09-01 NOTE — PATIENT INSTRUCTIONS
"Stop testosterone  Do labs fasting between 8-9 am in 12 weeks and then repeat same to confirm if abnormal or normal    If you do need testosterone, can consider gel again for more consistent levels.     See diabetes center about the prediabetes program.     Please schedule your follow up appointment for  4 months    TEST RESULTS:  Endocrinology is a specialty that relies heavily on interpretation of labs and other testing while also considering your personal medical history. This is best done in conjunction with an office visit, ideally with labs drawn prior to the visit so that they may be discussed in person. Any tests completed more than 7 days after your visit will require a further follow up visit for review and interpretation. This visit may be arranged as a telemedicine visit, in person, or and e-visit, depending on clinician availability when results become available.?Test Performed at this [facility] I will inform you of your test results-normal or otherwise. Please wait for a letter or a phone call. If for some reason you do not receive a letter of phone call about your results within 3 weeks after your test date, it is very important for you to contact our office.?Test Performed Outside of this facility: If you decide to have your tests performed outside this facility, it is your responsibility to contact us to confirm that the results have been received and reviewed by me.?    When you review your labs, there may be things that are noted slightly out of range that are not remarked upon by your physician. Please do not be alarmed! Many of these things are seen in some portion of a normal healthy population. Other \"abnormalities\" are within lab error range. Still others are \"calculations\" rather than actual lab values (example: BUN/Cr ratio) and the individual components in the calculation are just fine.?Labs done outside this [facility] will not be routinely resulted in MyChart or visible for " "viewing there. Occasionally a few selected outside labs will be hand-entered into our computer system (for tracking purposes), in which case you will receive notification of a result. However, the entire battery of tests will still not be visible as outside labs are scanned into the EPIC system and do not appear in the lab results section. The same is true for any other tests or imaging studies. If you would like to view your results on [employer], please be sure to have your tests done at this facility?Because your labs are now being automatically \"released\" by a computer system, it is possible you may receive notification at unusual times of day. Do not be concerned- you are not being contacted at odd hours because there is an emergency! Your test results will continue to be reviewed by your physician and results requiring follow up action will lead to a phone call from our office notifying you of such a concern. Conversely, please do not call the office after hours to address your test results. Please be aware that if you are notified of test results or messages via [employer], it is your responsibility to log in to look at them. If no special intervention is needed after testing is done, you will not receive any additional contact from the office (i.e. Calls, letters, or messages). If I your results are normal, I am able to see that you have reviewed them and no further action is needed, you will not be called or contacted by the office.     APPOINTMENTS:  Once you schedule an appointment, the responsibility to keep it becomes yours. Please be sure to give 24 hours notice when calling the office to cancel an appointment, as less notice will also be treated as a \"no show.\" It is your responsibility to call to reschedule any missed or canceled appointments.? Excessive no-show visits will result in dismissal from the practice.    PRESCRIPTIONS:  Bring all of your medications to each visit and request the necessary " refills at that time. Please allow 48 business hours for any refills requested outside of an office visit. Prescriptions will not be refilled after business hours or on weekends, so plan accordingly. Mail-order prescriptions can be electronically prescribed for you; if your mail-order pharmacy does not have this capability, you will need to mail in your written prescription.?    DIABETICS:  If you take medications to lower your glucoses, remember to always test your glucose prior to driving or operating any machinery.?Do not get pregnant without first normalizing your glucoses and discussing your plans with your physician.?Bring your glucose log book to all appointments at our office.    SCOPE OF MEDICAL CARE:  -Dr. Day is caring for you as your ENDOCRINOLOGIST. She does not function as a primary care provider for her patients. All patients are expected to have an internal medicine or family medicine physician to provide non-endocrine care, which also includes urgent care, annual physicals, cancer screenings, and preoperative clearance.    **Information for MyChart Usage**?    Prescription refills:  For prescription refills, please do not send a message/request as a note to your doctor- it will actually delay the process! The best way to get a prescription refill is to ask your pharmacy to contact our office. They will then do so electronically. Please leave 48 business hours for all refills.?    Messages to the office/your physician:  We will attempt to answer messages within 2 business days. Please do not message your physician with new symptoms or concerns about possible medication side effects. It is always possible that your symptoms are more serious than you even perceive and we would like to be certain they are addressed promptly! What other type of questions may not best for MyChart? If you have a complicated question that requires more than a 2 sentence response, or multiple questions, you may be  asked to make a follow up appointment. Review of test results done more than 7 days after your office visit will require an additional follow up visit to address and respond to questions. MyChart isn't a substitute for your personalized office visits.?

## 2023-09-12 RX ORDER — LISINOPRIL 40 MG/1
TABLET ORAL
Qty: 90 TABLET | Refills: 1 | Status: SHIPPED | OUTPATIENT
Start: 2023-09-12

## 2023-09-18 ENCOUNTER — CLINICAL SUPPORT (OUTPATIENT)
Dept: INTERNAL MEDICINE | Facility: CLINIC | Age: 68
End: 2023-09-18
Payer: MEDICARE

## 2023-09-18 DIAGNOSIS — E29.1 HYPOGONADISM IN MALE: Primary | ICD-10-CM

## 2023-09-19 RX ORDER — TESTOSTERONE CYPIONATE 200 MG/ML
200 INJECTION, SOLUTION INTRAMUSCULAR WEEKLY
Status: SHIPPED | OUTPATIENT
Start: 2023-09-19 | End: 2024-09-17

## 2023-09-19 RX ADMIN — TESTOSTERONE CYPIONATE 200 MG: 200 INJECTION, SOLUTION INTRAMUSCULAR at 09:55

## 2023-09-22 ENCOUNTER — LAB (OUTPATIENT)
Dept: LAB | Facility: HOSPITAL | Age: 68
End: 2023-09-22
Payer: MEDICARE

## 2023-09-22 DIAGNOSIS — E66.09 CLASS 2 OBESITY DUE TO EXCESS CALORIES WITHOUT SERIOUS COMORBIDITY WITH BODY MASS INDEX (BMI) OF 39.0 TO 39.9 IN ADULT: ICD-10-CM

## 2023-09-22 DIAGNOSIS — G45.9 TRANSIENT ISCHEMIC ATTACK: ICD-10-CM

## 2023-09-22 DIAGNOSIS — F33.1 MAJOR DEPRESSIVE DISORDER, RECURRENT, MODERATE: ICD-10-CM

## 2023-09-22 DIAGNOSIS — I10 HYPERTENSION, ESSENTIAL: ICD-10-CM

## 2023-09-22 DIAGNOSIS — R73.01 IFG (IMPAIRED FASTING GLUCOSE): ICD-10-CM

## 2023-09-22 DIAGNOSIS — D70.9 NEUTROPENIA, UNSPECIFIED TYPE: ICD-10-CM

## 2023-09-22 DIAGNOSIS — E29.1 HYPOGONADISM IN MALE: ICD-10-CM

## 2023-09-22 DIAGNOSIS — E78.2 MIXED HYPERLIPIDEMIA: ICD-10-CM

## 2023-09-22 LAB
BASOPHILS # BLD AUTO: 0.04 10*3/MM3 (ref 0–0.2)
BASOPHILS NFR BLD AUTO: 0.8 % (ref 0–1.5)
DEPRECATED RDW RBC AUTO: 42 FL (ref 37–54)
EOSINOPHIL # BLD AUTO: 0.13 10*3/MM3 (ref 0–0.4)
EOSINOPHIL NFR BLD AUTO: 2.5 % (ref 0.3–6.2)
ERYTHROCYTE [DISTWIDTH] IN BLOOD BY AUTOMATED COUNT: 12.2 % (ref 12.3–15.4)
HBA1C MFR BLD: 5.3 % (ref 4.8–5.6)
HCT VFR BLD AUTO: 45 % (ref 37.5–51)
HGB BLD-MCNC: 15.6 G/DL (ref 13–17.7)
IMM GRANULOCYTES # BLD AUTO: 0.04 10*3/MM3 (ref 0–0.05)
IMM GRANULOCYTES NFR BLD AUTO: 0.8 % (ref 0–0.5)
LYMPHOCYTES # BLD AUTO: 0.62 10*3/MM3 (ref 0.7–3.1)
LYMPHOCYTES NFR BLD AUTO: 11.7 % (ref 19.6–45.3)
MCH RBC QN AUTO: 32.8 PG (ref 26.6–33)
MCHC RBC AUTO-ENTMCNC: 34.7 G/DL (ref 31.5–35.7)
MCV RBC AUTO: 94.5 FL (ref 79–97)
MONOCYTES # BLD AUTO: 0.49 10*3/MM3 (ref 0.1–0.9)
MONOCYTES NFR BLD AUTO: 9.2 % (ref 5–12)
NEUTROPHILS NFR BLD AUTO: 3.98 10*3/MM3 (ref 1.7–7)
NEUTROPHILS NFR BLD AUTO: 75 % (ref 42.7–76)
NRBC BLD AUTO-RTO: 0 /100 WBC (ref 0–0.2)
PLATELET # BLD AUTO: 189 10*3/MM3 (ref 140–450)
PMV BLD AUTO: 9.2 FL (ref 6–12)
RBC # BLD AUTO: 4.76 10*6/MM3 (ref 4.14–5.8)
WBC NRBC COR # BLD: 5.3 10*3/MM3 (ref 3.4–10.8)

## 2023-09-22 PROCEDURE — 85025 COMPLETE CBC W/AUTO DIFF WBC: CPT

## 2023-09-22 PROCEDURE — 36415 COLL VENOUS BLD VENIPUNCTURE: CPT

## 2023-09-22 PROCEDURE — 84402 ASSAY OF FREE TESTOSTERONE: CPT

## 2023-09-22 PROCEDURE — 83036 HEMOGLOBIN GLYCOSYLATED A1C: CPT

## 2023-09-22 PROCEDURE — 84403 ASSAY OF TOTAL TESTOSTERONE: CPT

## 2023-09-26 ENCOUNTER — OFFICE VISIT (OUTPATIENT)
Dept: INTERNAL MEDICINE | Facility: CLINIC | Age: 68
End: 2023-09-26
Payer: MEDICARE

## 2023-09-26 VITALS
WEIGHT: 230 LBS | OXYGEN SATURATION: 94 % | BODY MASS INDEX: 36.96 KG/M2 | HEART RATE: 90 BPM | DIASTOLIC BLOOD PRESSURE: 69 MMHG | HEIGHT: 66 IN | SYSTOLIC BLOOD PRESSURE: 113 MMHG | TEMPERATURE: 98.2 F

## 2023-09-26 DIAGNOSIS — E78.2 MIXED HYPERLIPIDEMIA: ICD-10-CM

## 2023-09-26 DIAGNOSIS — C91.40 HAIRY CELL LEUKEMIA NOT HAVING ACHIEVED REMISSION: ICD-10-CM

## 2023-09-26 DIAGNOSIS — I10 HYPERTENSION, ESSENTIAL: ICD-10-CM

## 2023-09-26 DIAGNOSIS — E55.9 VITAMIN D DEFICIENCY: ICD-10-CM

## 2023-09-26 DIAGNOSIS — E29.1 HYPOGONADISM IN MALE: Primary | ICD-10-CM

## 2023-09-26 DIAGNOSIS — E66.09 CLASS 2 OBESITY DUE TO EXCESS CALORIES WITHOUT SERIOUS COMORBIDITY WITH BODY MASS INDEX (BMI) OF 39.0 TO 39.9 IN ADULT: ICD-10-CM

## 2023-09-26 DIAGNOSIS — R73.01 IFG (IMPAIRED FASTING GLUCOSE): ICD-10-CM

## 2023-09-26 DIAGNOSIS — G45.9 TRANSIENT ISCHEMIC ATTACK: ICD-10-CM

## 2023-09-26 DIAGNOSIS — D70.9 NEUTROPENIA, UNSPECIFIED TYPE: ICD-10-CM

## 2023-09-26 DIAGNOSIS — F33.1 MAJOR DEPRESSIVE DISORDER, RECURRENT, MODERATE: ICD-10-CM

## 2023-09-26 DIAGNOSIS — Z12.5 SCREENING PSA (PROSTATE SPECIFIC ANTIGEN): ICD-10-CM

## 2023-09-26 DIAGNOSIS — G47.33 OSA ON CPAP: ICD-10-CM

## 2023-09-26 RX ADMIN — TESTOSTERONE CYPIONATE 200 MG: 200 INJECTION, SOLUTION INTRAMUSCULAR at 10:59

## 2023-09-26 NOTE — PROGRESS NOTES
"CHIEF COMPLAINT/ HPI:  Fatigue (Routine follow up, Lab follow up. Pt states difficulty hearing in both ears. )  Patient is here to follow-up with his hairy cell leukemia, blood pressure,      Objective   Vital Signs  Vitals:    09/26/23 1049   BP: 113/69   Pulse: 90   Temp: 98.2 °F (36.8 °C)   SpO2: 94%   Weight: 104 kg (230 lb)   Height: 167.6 cm (65.98\")      Body mass index is 37.14 kg/m².  Review of Systems   Constitutional: Negative.    HENT: Negative.     Eyes: Negative.    Respiratory: Negative.     Cardiovascular: Negative.    Gastrointestinal: Negative.    Endocrine: Negative.    Genitourinary: Negative.    Musculoskeletal: Negative.    Allergic/Immunologic: Negative.    Neurological: Negative.    Hematological: Negative.    Psychiatric/Behavioral: Negative.      Physical Exam  Constitutional:       General: He is not in acute distress.     Appearance: Normal appearance. He is obese.   HENT:      Head: Normocephalic.      Mouth/Throat:      Mouth: Mucous membranes are moist.   Eyes:      Conjunctiva/sclera: Conjunctivae normal.      Pupils: Pupils are equal, round, and reactive to light.   Cardiovascular:      Rate and Rhythm: Normal rate and regular rhythm.      Pulses: Normal pulses.      Heart sounds: Normal heart sounds.   Pulmonary:      Effort: Pulmonary effort is normal.      Breath sounds: Normal breath sounds.   Abdominal:      General: Bowel sounds are normal.      Palpations: Abdomen is soft.   Musculoskeletal:         General: No swelling. Normal range of motion.      Cervical back: Neck supple.   Skin:     General: Skin is warm and dry.      Coloration: Skin is not jaundiced.   Neurological:      General: No focal deficit present.      Mental Status: He is alert and oriented to person, place, and time. Mental status is at baseline.   Psychiatric:         Mood and Affect: Mood normal.         Behavior: Behavior normal.         Thought Content: Thought content normal.         Judgment: Judgment " normal.      Result Review :   No results found for: PROBNP, BNP  CMP          1/5/2023    12:46 3/27/2023    11:42 6/28/2023    10:50   CMP   Glucose 99  99  103    BUN 20  14  17    Creatinine 1.10  1.07  1.12    EGFR 73.6  76.1  72.0    Sodium 138  138  139    Potassium 5.1  4.4  4.8    Chloride 101  102  103    Calcium 10.0  10.1  9.7    Total Protein 7.3  7.3  7.2    Albumin 4.6  4.8  5.1    Globulin 2.7  2.5  2.1    Total Bilirubin 0.5  0.6  0.8    Alkaline Phosphatase 60  56  59    AST (SGOT) 28  31  24    ALT (SGPT) 29  33  29    Albumin/Globulin Ratio 1.7  1.9  2.4    BUN/Creatinine Ratio 18.2  13.1  15.2    Anion Gap 11.4  11.1  13.0      CBC w/diff          6/28/2023    10:50 8/7/2023    15:37 9/22/2023    08:30   CBC w/Diff   WBC 4.94  7.32     5.30    RBC 5.14  4.86     4.76    Hemoglobin 16.7  15.7     15.6    Hematocrit 47.2  45.9     45.0    MCV 91.8  94.4     94.5    MCH 32.5  32.3     32.8    MCHC 35.4  34.2     34.7    RDW 12.9  13.6     12.2    Platelets 195  186     189    Neutrophil Rel % 70.1  74.2     75.0    Immature Granulocyte Rel % 0.6  0.5     0.8    Lymphocyte Rel % 14.0  11.3     11.7    Monocyte Rel % 12.3  11.3     9.2    Eosinophil Rel % 2.4  2.2     2.5    Basophil Rel % 0.6  0.5     0.8       Details          This result is from an external source.              Lipid Panel          3/27/2023    11:42   Lipid Panel   Total Cholesterol 143    Triglycerides 143    HDL Cholesterol 51    VLDL Cholesterol 25    LDL Cholesterol  67    LDL/HDL Ratio 1.24       Lab Results   Component Value Date    TSH 0.920 06/28/2023    TSH 0.671 03/27/2023    TSH 0.847 01/05/2023      Lab Results   Component Value Date    FREET4 1.26 06/28/2023    FREET4 1.17 03/27/2023    FREET4 1.15 01/05/2023      A1C Last 3 Results          3/27/2023    11:42 9/22/2023    08:30   HGBA1C Last 3 Results   Hemoglobin A1C 5.60  5.30       PSA          1/5/2023    12:46 3/27/2023    11:42   PSA   PSA 1.510  1.700                      Visit Diagnoses:    ICD-10-CM ICD-9-CM   1. Hypogonadism in male [E29.1 (ICD-10-CM)]  E29.1 257.2   2. SHANI on CPAP  G47.33 327.23    Z99.89 V46.8   3. Major depressive disorder, recurrent, moderate  F33.1 296.32   4. Vitamin D deficiency  E55.9 268.9   5. Hypertension, essential  I10 401.9   6. Neutropenia, unspecified type  D70.9 288.00   7. Mixed hyperlipidemia  E78.2 272.2   8. Class 2 obesity due to excess calories without serious comorbidity with body mass index (BMI) of 39.0 to 39.9 in adult  E66.09 278.00    Z68.39 V85.39   9. Hairy cell leukemia not having achieved remission  C91.40 202.40   10. Transient ischemic attack  G45.9 435.9   11. IFG (impaired fasting glucose)  R73.01 790.21   12. Screening PSA (prostate specific antigen)  Z12.5 V76.44       Assessment and Plan   Diagnoses and all orders for this visit:    1. Hypogonadism in male [E29.1 (ICD-10-CM)] (Primary)  -     Comprehensive Metabolic Panel; Future  -     CBC & Differential; Future  -     Lipid Panel; Future  -     Hemoglobin A1c; Future  -     PSA Screen; Future    2. SHANI on CPAP  -     Comprehensive Metabolic Panel; Future  -     CBC & Differential; Future  -     Lipid Panel; Future  -     Hemoglobin A1c; Future  -     PSA Screen; Future    3. Major depressive disorder, recurrent, moderate  -     Comprehensive Metabolic Panel; Future  -     CBC & Differential; Future  -     Lipid Panel; Future  -     Hemoglobin A1c; Future  -     PSA Screen; Future    4. Vitamin D deficiency  -     Comprehensive Metabolic Panel; Future  -     CBC & Differential; Future  -     Lipid Panel; Future  -     Hemoglobin A1c; Future  -     PSA Screen; Future    5. Hypertension, essential  -     Comprehensive Metabolic Panel; Future  -     CBC & Differential; Future  -     Lipid Panel; Future  -     Hemoglobin A1c; Future  -     PSA Screen; Future    6. Neutropenia, unspecified type  -     Comprehensive Metabolic Panel; Future  -     CBC &  Differential; Future  -     Lipid Panel; Future  -     Hemoglobin A1c; Future  -     PSA Screen; Future    7. Mixed hyperlipidemia  -     Comprehensive Metabolic Panel; Future  -     CBC & Differential; Future  -     Lipid Panel; Future  -     Hemoglobin A1c; Future  -     PSA Screen; Future    8. Class 2 obesity due to excess calories without serious comorbidity with body mass index (BMI) of 39.0 to 39.9 in adult  -     Comprehensive Metabolic Panel; Future  -     CBC & Differential; Future  -     Lipid Panel; Future  -     Hemoglobin A1c; Future  -     PSA Screen; Future    9. Hairy cell leukemia not having achieved remission  -     Comprehensive Metabolic Panel; Future  -     CBC & Differential; Future  -     Lipid Panel; Future  -     Hemoglobin A1c; Future  -     PSA Screen; Future    10. Transient ischemic attack  -     Comprehensive Metabolic Panel; Future  -     CBC & Differential; Future  -     Lipid Panel; Future  -     Hemoglobin A1c; Future  -     PSA Screen; Future    11. IFG (impaired fasting glucose)  -     Comprehensive Metabolic Panel; Future  -     CBC & Differential; Future  -     Lipid Panel; Future  -     Hemoglobin A1c; Future  -     PSA Screen; Future    12. Screening PSA (prostate specific antigen)  -     Comprehensive Metabolic Panel; Future  -     CBC & Differential; Future  -     Lipid Panel; Future  -     Hemoglobin A1c; Future  -     PSA Screen; Future         Hypogonadism,--started Depo testosterone injections every 2 weeks 200 mg/mL, seems to be tolerating well, getting injections at our office we will check testosterone levels at next visit,, patient is going to wellness clinic in [place], they are adjusting his hormone shots, doing lab work, discussed September 26, 2023    HA--//Neck pain with radiculopathy---, June 2018   results reviewed, no significant spinal pathology, no stroke on MRI of the brain, right maxillary sinus polyp, July 2018,---Headaches,, occipital neuralgia,  treatment options discussed with muscle relaxers, doxepin, etc.--work-up in the ER including a CT of the brain did not show any acute findings LP was not performed, patient's clinical symptoms are improving daily as of July 15, 2022    Hairy cell leukemia, status posttreatment Uma 15, 2022--with IV cladribine infusion for 7 days, Uma 15, 2022,-leukopenia, -------continued slow drop over the past 2 to 3 years since 2019, discussed with patient March 30, 2022 -- patient ended up going up to [place] Dr. Addison hairy cell leukemia diagnosis, currently monitoring without aggressive treatment, September 2022,, and December 7, 2022, and March 28, 2023,, labs stable September 2023,    PSA 1.7 March 27, 2023    umbilical hernia --- did see Adonay Sainz September 2021--wants to lose wgt first--- march 2022    Mild reactive depression, --cont Celexa 20 mg  Qd , Wellbutrin  daily    Polycythemia - much improved off testosterone, September 2022    HTN -  Cont Quinapril 40 mg bid,, doxazosin 4 mg daily at bedtime, spironolactone 25 mg daily---workup to include plasma metanephrine, renin, aldosterone and chemistry panels, August 2018---  All  normal--    LACUNAR INFARCT L BASAL GANGLIA, OLD , ON CT BRAIN JUNE 2018, -- echocardiogram,Normal ejection fraction otherwise unremarkable July 2018 carotid ultrasound, No significant stenosis July 2018,, Continue high-dose moderate dose statin therapy along with aspirin 81 mg daily, recommendations are to stop NSAIDs and go back to arthritis strength Tylenol to help with blood pressure control and lower risk of stroke etc.,    S/P LAP CHOLEY MAY 2019, HARLEY ----discussed postcholecystectomy diarrhea and--treatment options, Colestid March 2022    Jc, on cpap, 2019 , --    r shoulder mass, r/o lipoma--DID SEE ORTHO     elevated chol --cont -ROUSAVASTATIN 10 MG QD ,      IFG, hemoglobin A1c 5.3--- September 2023    C SCOPE 2013, HARLEY ,Repeat colonoscopy September 2019, ,  Colonoscopy noted, one small tubular adenoma             Follow Up   No follow-ups on file.  Patient was given instructions and counseling regarding his condition or for health maintenance advice. Please see specific information pulled into the AVS if appropriate.       Answers submitted by the patient for this visit:  Primary Reason for Visit (Submitted on 9/24/2023)  What is the primary reason for your visit?: Other  Other (Submitted on 9/24/2023)  Please describe your symptoms.: Follow up  Have you had these symptoms before?: Yes  How long have you been having these symptoms?: Greater than 2 weeks

## 2023-10-02 LAB
TESTOST FREE SERPL-MCNC: 25.3 PG/ML (ref 6.6–18.1)
TESTOST SERPL-MCNC: 945 NG/DL (ref 264–916)

## 2023-10-05 NOTE — TELEPHONE ENCOUNTER
Rx Refill Note  Requested Prescriptions      No prescriptions requested or ordered in this encounter      Last office visit with prescribing clinician: 9/26/2023   Last telemedicine visit with prescribing clinician: Visit date not found   Next office visit with prescribing clinician: Visit date not found                         Would you like a call back once the refill request has been completed: [] Yes [] No    If the office needs to give you a call back, can they leave a voicemail: [] Yes [] No    Deyanira Fung MA  10/05/23, 17:13 EDT

## 2023-10-06 ENCOUNTER — CLINICAL SUPPORT (OUTPATIENT)
Dept: INTERNAL MEDICINE | Facility: CLINIC | Age: 68
End: 2023-10-06
Payer: MEDICARE

## 2023-10-06 DIAGNOSIS — E29.1 HYPOGONADISM IN MALE: Primary | ICD-10-CM

## 2023-10-06 RX ORDER — CITALOPRAM 20 MG/1
20 TABLET ORAL DAILY
Qty: 90 TABLET | Refills: 1 | Status: SHIPPED | OUTPATIENT
Start: 2023-10-06

## 2023-10-06 RX ADMIN — TESTOSTERONE CYPIONATE 200 MG: 200 INJECTION, SOLUTION INTRAMUSCULAR at 13:32

## 2023-12-19 RX ORDER — CITALOPRAM 20 MG/1
20 TABLET ORAL DAILY
Qty: 90 TABLET | Refills: 1 | Status: SHIPPED | OUTPATIENT
Start: 2023-12-19

## 2024-01-10 ENCOUNTER — OFFICE VISIT (OUTPATIENT)
Dept: INTERNAL MEDICINE | Facility: CLINIC | Age: 69
End: 2024-01-10
Payer: MEDICARE

## 2024-01-10 VITALS
HEART RATE: 85 BPM | TEMPERATURE: 98.2 F | BODY MASS INDEX: 37.45 KG/M2 | SYSTOLIC BLOOD PRESSURE: 166 MMHG | DIASTOLIC BLOOD PRESSURE: 101 MMHG | WEIGHT: 233 LBS | OXYGEN SATURATION: 92 % | HEIGHT: 66 IN

## 2024-01-10 DIAGNOSIS — J06.9 ACUTE URI: Primary | ICD-10-CM

## 2024-01-10 LAB
EXPIRATION DATE: NORMAL
FLUAV AG UPPER RESP QL IA.RAPID: NOT DETECTED
FLUBV AG UPPER RESP QL IA.RAPID: NOT DETECTED
INTERNAL CONTROL: NORMAL
Lab: NORMAL
SARS-COV-2 AG UPPER RESP QL IA.RAPID: NOT DETECTED

## 2024-01-10 PROCEDURE — 99213 OFFICE O/P EST LOW 20 MIN: CPT | Performed by: INTERNAL MEDICINE

## 2024-01-10 RX ORDER — AMOXICILLIN AND CLAVULANATE POTASSIUM 875; 125 MG/1; MG/1
1 TABLET, FILM COATED ORAL 2 TIMES DAILY
Qty: 20 TABLET | Refills: 0 | Status: SHIPPED | OUTPATIENT
Start: 2024-01-10

## 2024-01-10 NOTE — PROGRESS NOTES
"CHIEF COMPLAINT/ HPI:  Sinusitis (Pt states sinus congestion, post nasal, sore throat. First day of symptoms , no otc meds taken. )    No fevers, congestion,          Objective   Vital Signs  Vitals:    01/10/24 1035   BP: (!) 166/101   Pulse: 85   Temp: 98.2 °F (36.8 °C)   SpO2: 92%   Weight: 106 kg (233 lb)   Height: 167.6 cm (65.98\")      Body mass index is 37.63 kg/m².  Review of Systems as above,  Physical Exam uvula is red swollen no exudates throat is otherwise red with some edema posterior pharynx, neck is supple mild tenderness to palpation mild enlargement of submandibular adenopathy bilateral, lungs are clear posterior, ears normal,  Result Review :   No results found for: \"PROBNP\", \"BNP\"  CMP          2/6/2023    15:37 3/27/2023    11:42 6/28/2023    10:50   CMP   Glucose  99  103    Glucose 91         BUN 18     14  17    Creatinine 0.99     1.07  1.12    EGFR  Am >60         EGFR  76.1  72.0    Sodium 141     138  139    Potassium 4.5     4.4  4.8    Chloride 105     102  103    Calcium 10.1     10.1  9.7    Total Protein 7.3     7.3  7.2    Albumin 4.6     4.8  5.1    Globulin 2.7     2.5  2.1    Total Bilirubin 0.7     0.6  0.8    Alkaline Phosphatase 55     56  59    AST (SGOT) 25     31  24    ALT (SGPT) 30     33  29    Albumin/Globulin Ratio  1.9  2.4    BUN/Creatinine Ratio 18.2     13.1  15.2    Anion Gap 8     11.1  13.0       Details          This result is from an external source.             CBC w/diff          6/28/2023    10:50 8/7/2023    15:37 9/22/2023    08:30   CBC w/Diff   WBC 4.94  7.32     5.30    RBC 5.14  4.86     4.76    Hemoglobin 16.7  15.7     15.6    Hematocrit 47.2  45.9     45.0    MCV 91.8  94.4     94.5    MCH 32.5  32.3     32.8    MCHC 35.4  34.2     34.7    RDW 12.9  13.6     12.2    Platelets 195  186     189    Neutrophil Rel % 70.1  74.2     75.0    Immature Granulocyte Rel % 0.6  0.5     0.8    Lymphocyte Rel % 14.0  11.3     11.7    Monocyte Rel % 12.3  " 11.3     9.2    Eosinophil Rel % 2.4  2.2     2.5    Basophil Rel % 0.6  0.5     0.8       Details          This result is from an external source.              Lipid Panel          3/27/2023    11:42   Lipid Panel   Total Cholesterol 143    Triglycerides 143    HDL Cholesterol 51    VLDL Cholesterol 25    LDL Cholesterol  67    LDL/HDL Ratio 1.24       Lab Results   Component Value Date    TSH 0.920 06/28/2023    TSH 0.671 03/27/2023    TSH 0.847 01/05/2023      Lab Results   Component Value Date    FREET4 1.26 06/28/2023    FREET4 1.17 03/27/2023    FREET4 1.15 01/05/2023      A1C Last 3 Results          3/27/2023    11:42 9/22/2023    08:30   HGBA1C Last 3 Results   Hemoglobin A1C 5.60  5.30       PSA          3/27/2023    11:42   PSA   PSA 1.700                     Visit Diagnoses:    ICD-10-CM ICD-9-CM   1. Acute URI  J06.9 465.9       Assessment and Plan   Diagnoses and all orders for this visit:    1. Acute URI (Primary)    Other orders  -     amoxicillin-clavulanate (AUGMENTIN) 875-125 MG per tablet; Take 1 tablet by mouth 2 (Two) Times a Day.  Dispense: 20 tablet; Refill: 0        Uri--COVID flu swab was negative, January 10, 2024 will treat with antibiotics--- for possible pharyngitis, early sinusitis,    Hypogonadism,--started Depo testosterone injections every 2 weeks 200 mg/mL, seems to be tolerating well, getting injections at our office we will check testosterone levels at next visit,, patient is going to wellness clinic in [place], they are adjusting his hormone shots, doing lab work, discussed September 26, 2023    HA--//Neck pain with radiculopathy---, June 2018   results reviewed, no significant spinal pathology, no stroke on MRI of the brain, right maxillary sinus polyp, July 2018,---Headaches,, occipital neuralgia, treatment options discussed with muscle relaxers, doxepin, etc.--work-up in the ER including a CT of the brain did not show any acute findings LP was not performed, patient's  clinical symptoms are improving daily as of July 15, 2022    Hairy cell leukemia, status posttreatment Uma 15, 2022--with IV cladribine infusion for 7 days, Uma 15, 2022,-leukopenia, -------continued slow drop over the past 2 to 3 years since 2019, discussed with patient March 30, 2022 -- patient ended up going up to [place] Dr. Addison hairy cell leukemia diagnosis, currently monitoring without aggressive treatment, September 2022,, and December 7, 2022, and March 28, 2023,, labs stable September 2023,    PSA 1.7 March 27, 2023    umbilical hernia --- did see Adonay Sainz September 2021--wants to lose wgt first--- march 2022    Mild reactive depression, --cont Celexa 20 mg  Qd , Wellbutrin  daily    Polycythemia - much improved off testosterone, September 2022    HTN -  Cont Quinapril 40 mg bid,, doxazosin 4 mg daily at bedtime, spironolactone 25 mg daily---workup to include plasma metanephrine, renin, aldosterone and chemistry panels, August 2018---  All  normal--    LACUNAR INFARCT L BASAL GANGLIA, OLD , ON CT BRAIN JUNE 2018, -- echocardiogram,Normal ejection fraction otherwise unremarkable July 2018 carotid ultrasound, No significant stenosis July 2018,, Continue high-dose moderate dose statin therapy along with aspirin 81 mg daily, recommendations are to stop NSAIDs and go back to arthritis strength Tylenol to help with blood pressure control and lower risk of stroke etc.,    S/P LAP CHOLEY MAY 2019, HARLEY ----discussed postcholecystectomy diarrhea and--treatment options, Colestid March 2022    Jc, on cpap, 2019 , --    r shoulder mass, r/o lipoma--DID SEE ORTHO     elevated chol --cont -ROUSAVASTATIN 10 MG QD ,      IFG, hemoglobin A1c 5.3--- September 2023    C SCOPE 2013HARLEY ,Repeat colonoscopy September 2019, , Colonoscopy noted, one small tubular adenoma                    Follow Up   No follow-ups on file.  Patient was given instructions and counseling regarding his condition or  for health maintenance advice. Please see specific information pulled into the AVS if appropriate.

## 2024-01-23 RX ORDER — ROSUVASTATIN CALCIUM 10 MG/1
TABLET, COATED ORAL
Qty: 90 TABLET | Refills: 3 | Status: SHIPPED | OUTPATIENT
Start: 2024-01-23

## 2024-01-23 RX ORDER — SPIRONOLACTONE 25 MG/1
TABLET ORAL
Qty: 90 TABLET | Refills: 3 | Status: SHIPPED | OUTPATIENT
Start: 2024-01-23

## 2024-03-15 ENCOUNTER — OFFICE VISIT (OUTPATIENT)
Dept: SLEEP MEDICINE | Facility: HOSPITAL | Age: 69
End: 2024-03-15
Payer: MEDICARE

## 2024-03-15 VITALS
HEART RATE: 70 BPM | OXYGEN SATURATION: 94 % | BODY MASS INDEX: 37.28 KG/M2 | SYSTOLIC BLOOD PRESSURE: 149 MMHG | DIASTOLIC BLOOD PRESSURE: 77 MMHG | HEIGHT: 66 IN | WEIGHT: 232 LBS

## 2024-03-15 DIAGNOSIS — G47.33 OBSTRUCTIVE SLEEP APNEA, ADULT: Primary | ICD-10-CM

## 2024-03-15 DIAGNOSIS — E66.09 CLASS 2 OBESITY DUE TO EXCESS CALORIES WITHOUT SERIOUS COMORBIDITY WITH BODY MASS INDEX (BMI) OF 39.0 TO 39.9 IN ADULT: ICD-10-CM

## 2024-03-15 PROCEDURE — G0463 HOSPITAL OUTPT CLINIC VISIT: HCPCS

## 2024-03-15 RX ORDER — QUINAPRIL 40 MG/1
TABLET ORAL EVERY 12 HOURS SCHEDULED
COMMUNITY

## 2024-03-15 NOTE — PROGRESS NOTES
Reason for Consultation: Apnea on CPAP        Patient Care Team:  Isaac Matta MD as PCP - General (Internal Medicine)  Dione Nayak MD PhD as Consulting Physician (Hematology and Oncology)  Hali Paredes MD, MPH as Consulting Physician (Sleep Medicine)      History of present illness:    Thank you for asking me to see your patient.  The patient is a 68 y.o. male has had CPAP therapy since diagnostic study from 9/18/2018 revealed an DULCE of 43.2.  He has a [employer]d [employer] DreamStation 2 and his AutoSet pressure spans 9 to 15 cm water pressure with median pressure being 8.9.  He is very happy with how he is doing his average use is 9 hours and 22 minutes and from 2/13 through 3/13/2024 is used CPAP 100% of those days.  His average time with a large leak is 0.  He is fond of humidity on his CPAP machine.  His questionnaire demonstrates that he goes to bed at 1030 and gets up at 7 AM and sleeps for 8 hours.  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  He says he is lost 20 pounds in the past 5 years and gets up 1 time per night to go to the bathroom.  4 alcoholic drinks per day and 3 cups of coffee per day feels systems is positive for fatigue.    [place]: 7    Data Reviewed: Reviewed his compliance download sleep questionnaire and sleep study from 2018.      PMH:  Past Medical History:   Diagnosis Date    Cervical root disorders, not elsewhere classified     Essential (primary) hypertension     ETOH abuse     Hairy cell leukemia     High cholesterol     Impaired fasting glucose     Pure hypercholesterolemia     Sleep apnea, unspecified     Transient cerebral ischemic attack, unspecified     Vitamin D deficiency, unspecified           Allergies:  Patient has no known allergies.     Medication Review:   Current Outpatient Medications on File Prior to Visit   Medication Sig Dispense Refill    aspirin 81 MG chewable tablet aspirin 81 mg oral tablet,chewable chew 1  "tablet (81 mg) by oral route once daily   Active      buPROPion XL (Wellbutrin XL) 300 MG 24 hr tablet Take 1 tablet by mouth Daily. 90 tablet 3    cholecalciferol (VITAMIN D3) 25 MCG (1000 UT) tablet       citalopram (CeleXA) 20 MG tablet TAKE 1 TABLET BY MOUTH EVERY DAY 90 tablet 1    loratadine (CLARITIN) 10 MG tablet loratadine 10 mg oral tablet take 1 tablet (10 mg) by oral route once daily   Active      quinapril (ACCUPRIL) 40 MG tablet Every 12 (Twelve) Hours.      rosuvastatin (CRESTOR) 10 MG tablet TAKE 1 TABLET BY MOUTH EVERY DAY 90 tablet 3    spironolactone (ALDACTONE) 25 MG tablet TAKE 1 TABLET BY MOUTH EVERY DAY 90 tablet 3    Testosterone Cypionate (DEPOTESTOTERONE CYPIONATE) 200 MG/ML injection INJECT 1 ML SUBCUTANEOUSLY AS DIRECTED EVERY 2 WEEKS 2 mL 5    amoxicillin-clavulanate (AUGMENTIN) 875-125 MG per tablet Take 1 tablet by mouth 2 (Two) Times a Day. (Patient not taking: Reported on 3/15/2024) 20 tablet 0    amoxicillin-clavulanate (AUGMENTIN) 875-125 MG per tablet Take 1 tablet by mouth 2 (Two) Times a Day. (Patient not taking: Reported on 3/15/2024) 20 tablet 0    lisinopril (PRINIVIL,ZESTRIL) 40 MG tablet TAKE 1 TABLET BY MOUTH EVERY DAY (Patient not taking: Reported on 3/15/2024) 90 tablet 1     Current Facility-Administered Medications on File Prior to Visit   Medication Dose Route Frequency Provider Last Rate Last Admin    Testosterone Cypionate (DEPOTESTOTERONE CYPIONATE) injection 200 mg  200 mg Intramuscular Weekly Isaac Matta MD   200 mg at 10/06/23 1332         Vital Signs:    Vitals:    03/15/24 1100   BP: 149/77   Pulse: 70   SpO2: 94%   Weight: 105 kg (232 lb)   Height: 168.9 cm (66.5\")        Body mass index is 36.89 kg/m².  Neck Circumference: 18.5 inches      Physical Exam:    Constitutional:  Well developed 68 y.o. male that appears in no apparent distress.  Awake & oriented times 3.  Normal mood with normal recent and remote memory and normal judgement.  Eyes:  " Conjunctivae normal.  Oropharynx: Moist mucous membranes without exudate and Mallampati 4  Neck: Trachea midline  Respiratory: Effort is not labored  Cardiovascular: Radial pulse regular  Musculoskeletal: Gait appears normal, no digital clubbing evident, no pre-tibial edema        Impression:   Encounter Diagnoses   Name Primary?    Obstructive sleep apnea, adult Yes    Class 2 obesity due to excess calories without serious comorbidity with body mass index (BMI) of 39.0 to 39.9 in adult      Patient's BMI is Body mass index is 36.89 kg/m².    Patient is very well treated at present and so I am not can make any changes but will renew his supplies.  As was with his wife he would like to have a prescription for portable machine because they plan to travel and they would have very much room in the camper.  As such I have him order for a portable CPAP machine that he might have for his travels.    Plan:    Renewed his supplies for his present CPAP machine and have given him a prescription for portable machine.    The patient should practice good sleep hygiene measures.      Weight loss might be beneficial in this patient who has a Body mass index is 36.89 kg/m².      Pathophysiology of SHANI described to the patient.  Cardiovascular complications of untreated SHANI also reviewed.      The patient was cautioned about the dangers of drowsy driving.    Jorge Alberto Paredes MD  Sleep Medicine  03/15/24  11:56 EDT

## 2024-03-19 ENCOUNTER — TELEPHONE (OUTPATIENT)
Dept: INTERNAL MEDICINE | Age: 69
End: 2024-03-19
Payer: MEDICARE

## 2024-03-19 RX ORDER — CITALOPRAM 20 MG/1
20 TABLET ORAL DAILY
Qty: 90 TABLET | Refills: 1 | Status: SHIPPED | OUTPATIENT
Start: 2024-03-19

## 2024-03-20 ENCOUNTER — TELEPHONE (OUTPATIENT)
Dept: INTERNAL MEDICINE | Age: 69
End: 2024-03-20
Payer: MEDICARE

## 2024-03-20 DIAGNOSIS — R73.01 IFG (IMPAIRED FASTING GLUCOSE): ICD-10-CM

## 2024-03-20 DIAGNOSIS — E29.1 HYPOGONADISM IN MALE: ICD-10-CM

## 2024-03-20 DIAGNOSIS — E78.2 MIXED HYPERLIPIDEMIA: ICD-10-CM

## 2024-03-20 DIAGNOSIS — E66.09 CLASS 2 OBESITY DUE TO EXCESS CALORIES WITHOUT SERIOUS COMORBIDITY WITH BODY MASS INDEX (BMI) OF 39.0 TO 39.9 IN ADULT: ICD-10-CM

## 2024-03-20 DIAGNOSIS — F33.1 MAJOR DEPRESSIVE DISORDER, RECURRENT, MODERATE: ICD-10-CM

## 2024-03-20 DIAGNOSIS — G45.9 TRANSIENT ISCHEMIC ATTACK: ICD-10-CM

## 2024-03-20 DIAGNOSIS — I10 HYPERTENSION, ESSENTIAL: ICD-10-CM

## 2024-03-20 DIAGNOSIS — D70.9 NEUTROPENIA, UNSPECIFIED TYPE: ICD-10-CM

## 2024-03-20 RX ORDER — BUPROPION HYDROCHLORIDE 300 MG/1
300 TABLET ORAL DAILY
Qty: 90 TABLET | Refills: 3 | Status: SHIPPED | OUTPATIENT
Start: 2024-03-20

## 2024-03-27 ENCOUNTER — CLINICAL SUPPORT (OUTPATIENT)
Dept: INTERNAL MEDICINE | Age: 69
End: 2024-03-27
Payer: MEDICARE

## 2024-03-27 PROCEDURE — 96372 THER/PROPH/DIAG INJ SC/IM: CPT | Performed by: INTERNAL MEDICINE

## 2024-03-27 RX ADMIN — TESTOSTERONE CYPIONATE 200 MG: 200 INJECTION, SOLUTION INTRAMUSCULAR at 15:56

## 2024-03-29 ENCOUNTER — TELEPHONE (OUTPATIENT)
Dept: INTERNAL MEDICINE | Age: 69
End: 2024-03-29
Payer: MEDICARE

## 2024-03-29 RX ORDER — AMOXICILLIN AND CLAVULANATE POTASSIUM 875; 125 MG/1; MG/1
1 TABLET, FILM COATED ORAL 2 TIMES DAILY
Qty: 20 TABLET | Refills: 0 | Status: SHIPPED | OUTPATIENT
Start: 2024-03-29 | End: 2024-04-08

## 2024-03-29 NOTE — TELEPHONE ENCOUNTER
"Caller: Dung Burch \"Kit\"    Relationship: Self    Best call back number: [phone]    What medication are you requesting:   amoxicillin-clavulanate (AUGMENTIN) 875-125 MG per tablet     What are your current symptoms:   SORE THROAT, RUNNY NOSE     If a prescription is needed, what is your preferred pharmacy and phone number: [facility] [place] [address] - [phone]  - [phone] FX     Additional notes:  PATIENT WOULD LIKE TO HAVE THIS CALLED IN AS IT HAS HELPED HIM PREVIOUSLY.   "

## 2024-04-05 ENCOUNTER — LAB (OUTPATIENT)
Dept: INTERNAL MEDICINE | Age: 69
End: 2024-04-05
Payer: MEDICARE

## 2024-04-05 PROCEDURE — 96372 THER/PROPH/DIAG INJ SC/IM: CPT | Performed by: INTERNAL MEDICINE

## 2024-04-05 RX ADMIN — TESTOSTERONE CYPIONATE 200 MG: 200 INJECTION, SOLUTION INTRAMUSCULAR at 14:59

## 2024-05-02 ENCOUNTER — OFFICE VISIT (OUTPATIENT)
Dept: INTERNAL MEDICINE | Age: 69
End: 2024-05-02
Payer: MEDICARE

## 2024-05-02 VITALS
DIASTOLIC BLOOD PRESSURE: 83 MMHG | BODY MASS INDEX: 36.16 KG/M2 | SYSTOLIC BLOOD PRESSURE: 144 MMHG | TEMPERATURE: 97 F | HEIGHT: 66 IN | HEART RATE: 76 BPM | WEIGHT: 225 LBS | OXYGEN SATURATION: 92 %

## 2024-05-02 DIAGNOSIS — S70.262S TICK BITE OF LEFT HIP, SEQUELA: ICD-10-CM

## 2024-05-02 DIAGNOSIS — E66.01 CLASS 2 SEVERE OBESITY DUE TO EXCESS CALORIES WITH SERIOUS COMORBIDITY AND BODY MASS INDEX (BMI) OF 36.0 TO 36.9 IN ADULT: ICD-10-CM
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
## 2024-05-02 DIAGNOSIS — R73.01 IMPAIRED FASTING GLUCOSE: Primary | ICD-10-CM

## 2024-05-02 DIAGNOSIS — R41.3 MEMORY CHANGES: ICD-10-CM

## 2024-05-02 PROBLEM — S70.262A TICK BITE OF LEFT HIP: Status: ACTIVE | Noted: 2024-05-02

## 2024-05-02 PROBLEM — E66.812 CLASS 2 SEVERE OBESITY DUE TO EXCESS CALORIES WITH SERIOUS COMORBIDITY AND BODY MASS INDEX (BMI) OF 36.0 TO 36.9 IN ADULT: Status: ACTIVE | Noted: 2024-05-02
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
## 2024-05-02 PROCEDURE — 99214 OFFICE O/P EST MOD 30 MIN: CPT | Performed by: INTERNAL MEDICINE

## 2024-05-02 PROCEDURE — 3079F DIAST BP 80-89 MM HG: CPT | Performed by: INTERNAL MEDICINE

## 2024-05-02 PROCEDURE — 3077F SYST BP >= 140 MM HG: CPT | Performed by: INTERNAL MEDICINE

## 2024-05-02 NOTE — PROGRESS NOTES
"CHIEF COMPLAINT/ HPI:  Tick Removal (Tick bite )    Patient had an area on the left lateral hip area that was concerning about a week and a half ago and it was red and swollen is gotten some better, has not been running a fever    Patient brought in lab work done through another physician and group in [place], had questions about it    Patient is concerned about diabetes developing based on lab work, and something for weight loss,    Also concerned about memory issues,          Objective   Vital Signs  Vitals:    05/02/24 1604   BP: 144/83   BP Location: Left arm   Patient Position: Sitting   Cuff Size: Large Adult   Pulse: 76   Temp: 97 °F (36.1 °C)   SpO2: 92%   Weight: 102 kg (225 lb)   Height: 167.6 cm (66\")      Body mass index is 36.32 kg/m².  Review of Systems   Constitutional: Negative.    HENT: Negative.     Eyes: Negative.    Respiratory: Negative.     Cardiovascular: Negative.    Gastrointestinal: Negative.    Endocrine: Negative.    Genitourinary: Negative.    Musculoskeletal: Negative.    Allergic/Immunologic: Negative.    Neurological: Negative.    Hematological: Negative.    Psychiatric/Behavioral: Negative.        Physical Exam  Constitutional:       General: He is not in acute distress.     Appearance: Normal appearance. He is obese.   HENT:      Head: Normocephalic.      Mouth/Throat:      Mouth: Mucous membranes are moist.   Eyes:      Conjunctiva/sclera: Conjunctivae normal.      Pupils: Pupils are equal, round, and reactive to light.   Cardiovascular:      Rate and Rhythm: Normal rate and regular rhythm.      Pulses: Normal pulses.      Heart sounds: Normal heart sounds.   Pulmonary:      Effort: Pulmonary effort is normal.      Breath sounds: Normal breath sounds.   Abdominal:      General: Bowel sounds are normal.      Palpations: Abdomen is soft.   Musculoskeletal:         General: No swelling. Normal range of motion.      Cervical back: Neck supple.   Skin:     General: Skin is warm and " "dry.      Coloration: Skin is not jaundiced.   Neurological:      General: No focal deficit present.      Mental Status: He is alert and oriented to person, place, and time. Mental status is at baseline.   Psychiatric:         Mood and Affect: Mood normal.         Behavior: Behavior normal.         Thought Content: Thought content normal.         Judgment: Judgment normal.        Result Review :   No results found for: \"PROBNP\", \"BNP\"  CMP          6/28/2023    10:50   CMP   Glucose 103    BUN 17    Creatinine 1.12    EGFR 72.0    Sodium 139    Potassium 4.8    Chloride 103    Calcium 9.7    Total Protein 7.2    Albumin 5.1    Globulin 2.1    Total Bilirubin 0.8    Alkaline Phosphatase 59    AST (SGOT) 24    ALT (SGPT) 29    Albumin/Globulin Ratio 2.4    BUN/Creatinine Ratio 15.2    Anion Gap 13.0      CBC w/diff          8/7/2023    15:37 9/22/2023    08:30 2/12/2024    13:25   CBC w/Diff   WBC 7.32     5.30  5.11       RBC 4.86     4.76  4.90       Hemoglobin 15.7     15.6  15.3       Hematocrit 45.9     45.0  44.2       MCV 94.4     94.5  90.2       MCH 32.3     32.8  31.2       MCHC 34.2     34.7  34.6       RDW 13.6     12.2  13.0       Platelets 186     189  184       Neutrophil Rel % 74.2     75.0  68.2       Immature Granulocyte Rel % 0.5     0.8  1.0       Lymphocyte Rel % 11.3     11.7  16.4       Monocyte Rel % 11.3     9.2  11.5       Eosinophil Rel % 2.2     2.5  2.3       Basophil Rel % 0.5     0.8  0.6          Details          This result is from an external source.                 Lab Results   Component Value Date    TSH 0.920 06/28/2023    TSH 0.671 03/27/2023    TSH 0.847 01/05/2023      Lab Results   Component Value Date    FREET4 1.26 06/28/2023    FREET4 1.17 03/27/2023    FREET4 1.15 01/05/2023      A1C Last 3 Results          9/22/2023    08:30   HGBA1C Last 3 Results   Hemoglobin A1C 5.30                        Visit Diagnoses:    ICD-10-CM ICD-9-CM   1. Impaired fasting glucose  R73.01 " 790.21   2. Memory changes  R41.3 780.93   3. Tick bite of left hip, sequela  S70.262S 906.2    W57.XXXS    4. Class 2 severe obesity due to excess calories with serious comorbidity and body mass index (BMI) of 36.0 to 36.9 in adult  E66.01 278.01    Z68.36 V85.36       Assessment and Plan   Diagnoses and all orders for this visit:    1. Impaired fasting glucose (Primary)    2. Memory changes    3. Tick bite of left hip, sequela    4. Class 2 severe obesity due to excess calories with serious comorbidity and body mass index (BMI) of 36.0 to 36.9 in adult    Other orders  -     Tirzepatide-Weight Management (ZEPBOUND) 2.5 MG/0.5ML solution auto-injector; Inject 0.5 mL under the skin into the appropriate area as directed 1 (One) Time Per Week.  Dispense: 2 mL; Refill: 5  -     metFORMIN (GLUCOPHAGE) 500 MG tablet; Take 1 tablet by mouth 2 (Two) Times a Day With Meals.  Dispense: 60 tablet; Refill: 5        Class 2 Severe Obesity (BMI >=35 and <=39.9). Obesity-related health conditions include the following: hypertension and GERD. Obesity is worsening. BMI is is above average; BMI management plan is completed. We discussed low calorie, low carb based diet program, portion control, and increasing exercise.  Treatment options discussed, consider use of metformin versus Wegovy or Trulicity etc., discussed May 2, 2024    Tick bite healing, would avoid antibiotics if possible    Impaired fasting glucose treatment options as above with metformin versus Ozempic Wegovy etc.,---hga1c =5.7 we will also send in metformin 500 mg twice a [facility], May 2, 2024 patient is can continue weight loss efforts walking, dietary restrictions etc.,    Memory issues concerns, workup discussed with patient,--- will include MRI of the brain, avoiding all alcohol, potential medication usage etc. stress, including increasing exercise walking, weight loss efforts would help also, and consider [facility]  neu[facility] for more formal testing,             Follow Up   No follow-ups on file.  Patient was given instructions and counseling regarding his condition or for health maintenance advice. Please see specific information pulled into the AVS if appropriate.           Answers submitted by the patient for this visit:  Primary Reason for Visit (Submitted on 4/30/2024)  What is the primary reason for your visit?: Other  Other (Submitted on 4/30/2024)  Please describe your symptoms.: Tick bite  Have you had these symptoms before?: No  How long have you been having these symptoms?: 5-7 days

## 2024-05-28 RX ORDER — LISINOPRIL 40 MG/1
TABLET ORAL
Qty: 90 TABLET | Refills: 1 | Status: SHIPPED | OUTPATIENT
Start: 2024-05-28

## 2024-07-15 ENCOUNTER — OFFICE VISIT (OUTPATIENT)
Dept: INTERNAL MEDICINE | Age: 69
End: 2024-07-15
Payer: MEDICARE

## 2024-07-15 VITALS
OXYGEN SATURATION: 95 % | RESPIRATION RATE: 18 BRPM | SYSTOLIC BLOOD PRESSURE: 148 MMHG | WEIGHT: 225.6 LBS | HEART RATE: 85 BPM | DIASTOLIC BLOOD PRESSURE: 78 MMHG | BODY MASS INDEX: 36.26 KG/M2 | TEMPERATURE: 98.4 F | HEIGHT: 66 IN

## 2024-07-15 DIAGNOSIS — U07.1 COVID-19 VIRUS INFECTION: Primary | ICD-10-CM

## 2024-07-15 LAB
EXPIRATION DATE: ABNORMAL
FLUAV AG UPPER RESP QL IA.RAPID: NOT DETECTED
FLUBV AG UPPER RESP QL IA.RAPID: NOT DETECTED
INTERNAL CONTROL: ABNORMAL
Lab: ABNORMAL
SARS-COV-2 AG UPPER RESP QL IA.RAPID: DETECTED

## 2024-07-15 PROCEDURE — 3078F DIAST BP <80 MM HG: CPT | Performed by: NURSE PRACTITIONER

## 2024-07-15 PROCEDURE — 1126F AMNT PAIN NOTED NONE PRSNT: CPT | Performed by: NURSE PRACTITIONER

## 2024-07-15 PROCEDURE — 3077F SYST BP >= 140 MM HG: CPT | Performed by: NURSE PRACTITIONER

## 2024-07-15 PROCEDURE — 87428 SARSCOV & INF VIR A&B AG IA: CPT | Performed by: NURSE PRACTITIONER

## 2024-07-15 PROCEDURE — 99213 OFFICE O/P EST LOW 20 MIN: CPT | Performed by: NURSE PRACTITIONER

## 2024-07-15 RX ORDER — BENZONATATE 200 MG/1
200 CAPSULE ORAL 3 TIMES DAILY PRN
Qty: 30 CAPSULE | Refills: 0 | Status: SHIPPED | OUTPATIENT
Start: 2024-07-15

## 2024-07-15 NOTE — PROGRESS NOTES
Chief Complaint  No chief complaint on file.  Subjective      History of Present Illness  Dung Burch is a 68 y.o. male who presents to [facility] for an acute visit for complaint of     Review of Systems  Constitutional: Negative for loss of appetite, fever and chills.   HEENT: Negative for sinus pain and earache.   Cardiovascular: Negative for chest pain.   Respiratory: Negative for wheezing and dyspnea.   Gastrointestinal: Negative for nausea, vomiting and diarrhea.   Integumentary: Negative for rash.   Musculoskeletal: Negative for myalgia.   Neurologic: Negative for headaches and dizziness.     Patient Care Team:  Isaac Matta MD as PCP - General (Internal Medicine)  Dione Nayak MD PhD as Consulting Physician (Hematology and Oncology)    Past Medical History:   Diagnosis Date    Cervical root disorders, not elsewhere classified     Essential (primary) hypertension     ETOH abuse     Hairy cell leukemia     High cholesterol     Impaired fasting glucose     Pure hypercholesterolemia     Sleep apnea, unspecified     Transient cerebral ischemic attack, unspecified     Vitamin D deficiency, unspecified         Past Surgical History:   Procedure Laterality Date    CHOLECYSTECTOMY  08/2019    ENDOSCOPY AND COLONOSCOPY  2008    KNEE SURGERY Right     OTHER SURGICAL HISTORY      TUBAL ADENOMA    OTHER SURGICAL HISTORY Left     CHEEK    THYROID CYST EXCISION  2000        No Known Allergies       Current Outpatient Medications:     amoxicillin-clavulanate (AUGMENTIN) 875-125 MG per tablet, Take 1 tablet by mouth 2 (Two) Times a Day., Disp: 20 tablet, Rfl: 0    amoxicillin-clavulanate (AUGMENTIN) 875-125 MG per tablet, Take 1 tablet by mouth 2 (Two) Times a Day., Disp: 20 tablet, Rfl: 0    aspirin 81 MG chewable tablet, aspirin 81 mg oral tablet,chewable chew 1 tablet (81 mg) by oral route once daily   Active, Disp: , Rfl:     buPROPion XL (Wellbutrin XL) 300 MG 24  "hr tablet, Take 1 tablet by mouth Daily., Disp: 90 tablet, Rfl: 3    cholecalciferol (VITAMIN D3) 25 MCG (1000 UT) tablet, , Disp: , Rfl:     citalopram (CeleXA) 20 MG tablet, Take 1 tablet by mouth Daily., Disp: 90 tablet, Rfl: 1    lisinopril (PRINIVIL,ZESTRIL) 40 MG tablet, TAKE 1 TABLET BY MOUTH EVERY DAY, Disp: 90 tablet, Rfl: 1    loratadine (CLARITIN) 10 MG tablet, loratadine 10 mg oral tablet take 1 tablet (10 mg) by oral route once daily   Active, Disp: , Rfl:     metFORMIN (GLUCOPHAGE) 500 MG tablet, Take 1 tablet by mouth 2 (Two) Times a Day With Meals., Disp: 60 tablet, Rfl: 5    quinapril (ACCUPRIL) 40 MG tablet, Every 12 (Twelve) Hours., Disp: , Rfl:     rosuvastatin (CRESTOR) 10 MG tablet, TAKE 1 TABLET BY MOUTH EVERY DAY, Disp: 90 tablet, Rfl: 3    spironolactone (ALDACTONE) 25 MG tablet, TAKE 1 TABLET BY MOUTH EVERY DAY, Disp: 90 tablet, Rfl: 3    Testosterone Cypionate (DEPOTESTOTERONE CYPIONATE) 200 MG/ML injection, INJECT 1 ML SUBCUTANEOUSLY AS DIRECTED EVERY 2 WEEKS, Disp: 2 mL, Rfl: 5    Tirzepatide-Weight Management (ZEPBOUND) 2.5 MG/0.5ML solution auto-injector, Inject 0.5 mL under the skin into the appropriate area as directed 1 (One) Time Per Week., Disp: 2 mL, Rfl: 5    Current Facility-Administered Medications:     Testosterone Cypionate (DEPOTESTOTERONE CYPIONATE) injection 200 mg, 200 mg, Intramuscular, Weekly, Isaac Matta MD, 200 mg at 04/05/24 4969    Objective   There were no vitals taken for this visit.   Estimated body mass index is 36.32 kg/m² as calculated from the following:    Height as of 5/2/24: 167.6 cm (66\").    Weight as of 5/2/24: 102 kg (225 lb).   Physical Exam   Result Review :  {The following data was reviewed by (Optional):25804}  {Ambulatory Labs (Optional):42942}  {Data reviewed (Optional):27226:::1}            Assessment and Plan   There are no diagnoses linked to this encounter.    Education on diagnosis, medication and treatment plan.  Patient was " given instructions and counseling regarding his condition. Please see specific information pulled into the AVS if appropriate.     Follow Up   If not improving or worsening the patient was instructed to follow-up.    Dictated Utilizing Dragon Dictation.  Please note that portions of this note were completed with a voice recognition program.  Part of this note may be an electronic transcription/translation of spoken language to printed text using the Dragon Dictation System.    LETI Bell

## 2024-07-15 NOTE — PROGRESS NOTES
Chief Complaint  Cough (Patient is here for cough, body aches, and congestion. Symptoms started 2 days ago, tested positive for covid at home today )  Subjective      History of Present Illness  Dung Burch is a 68 y.o. male who presents to [facility] for an acute visit for complaint of cough, nasal congestion and myalgia for 2 days.  He had a home COVID test that was positive but said it was . 1 week ago he was exposed to someone with Covid.     Review of Systems  Constitutional: Negative for fever and chills.   HEENT: Negative for earache.  Positive for sore throat.  Respiratory: Negative for wheezing.  Positive for mild shortness of air.  Gastrointestinal: Negative for nausea, vomiting and diarrhea.   Integumentary: Negative for rash.   Musculoskeletal: Positive for myalgia.   Neurologic: Positive for headaches.    Patient Care Team:  Isaac Matta MD as PCP - General (Internal Medicine)  Dione Nayak MD PhD as Consulting Physician (Hematology and Oncology)    Past Medical History:   Diagnosis Date    Cervical root disorders, not elsewhere classified     Essential (primary) hypertension     ETOH abuse     Hairy cell leukemia     High cholesterol     Impaired fasting glucose     Pure hypercholesterolemia     Sleep apnea, unspecified     Transient cerebral ischemic attack, unspecified     Vitamin D deficiency, unspecified         Past Surgical History:   Procedure Laterality Date    CHOLECYSTECTOMY  2019    ENDOSCOPY AND COLONOSCOPY      KNEE SURGERY Right     OTHER SURGICAL HISTORY      TUBAL ADENOMA    OTHER SURGICAL HISTORY Left     CHEEK    THYROID CYST EXCISION          No Known Allergies       Current Outpatient Medications:     aspirin 81 MG chewable tablet, aspirin 81 mg oral tablet,chewable chew 1 tablet (81 mg) by oral route once daily   Active, Disp: , Rfl:     buPROPion XL (Wellbutrin XL) 300 MG 24 hr tablet, Take 1 tablet by  "mouth Daily., Disp: 90 tablet, Rfl: 3    cholecalciferol (VITAMIN D3) 25 MCG (1000 UT) tablet, , Disp: , Rfl:     citalopram (CeleXA) 20 MG tablet, Take 1 tablet by mouth Daily., Disp: 90 tablet, Rfl: 1    lisinopril (PRINIVIL,ZESTRIL) 40 MG tablet, TAKE 1 TABLET BY MOUTH EVERY DAY, Disp: 90 tablet, Rfl: 1    loratadine (CLARITIN) 10 MG tablet, loratadine 10 mg oral tablet take 1 tablet (10 mg) by oral route once daily   Active, Disp: , Rfl:     metFORMIN (GLUCOPHAGE) 500 MG tablet, Take 1 tablet by mouth 2 (Two) Times a Day With Meals., Disp: 60 tablet, Rfl: 5    quinapril (ACCUPRIL) 40 MG tablet, Every 12 (Twelve) Hours., Disp: , Rfl:     rosuvastatin (CRESTOR) 10 MG tablet, TAKE 1 TABLET BY MOUTH EVERY DAY, Disp: 90 tablet, Rfl: 3    spironolactone (ALDACTONE) 25 MG tablet, TAKE 1 TABLET BY MOUTH EVERY DAY, Disp: 90 tablet, Rfl: 3    Testosterone Cypionate (DEPOTESTOTERONE CYPIONATE) 200 MG/ML injection, INJECT 1 ML SUBCUTANEOUSLY AS DIRECTED EVERY 2 WEEKS, Disp: 2 mL, Rfl: 5    Tirzepatide-Weight Management (ZEPBOUND) 2.5 MG/0.5ML solution auto-injector, Inject 0.5 mL under the skin into the appropriate area as directed 1 (One) Time Per Week., Disp: 2 mL, Rfl: 5    benzonatate (TESSALON) 200 MG capsule, Take 1 capsule by mouth 3 (Three) Times a Day As Needed for Cough., Disp: 30 capsule, Rfl: 0    Nirmatrelvir & Ritonavir, 300mg/100mg, (PAXLOVID), Take 3 tablets by mouth 2 (Two) Times a Day for 5 days. Hold cholesterol med while taking this med., Disp: 30 tablet, Rfl: 0    Current Facility-Administered Medications:     Testosterone Cypionate (DEPOTESTOTERONE CYPIONATE) injection 200 mg, 200 mg, Intramuscular, Weekly, Isaac Matta MD, 200 mg at 04/05/24 1459    Objective   /78 (BP Location: Right arm, Patient Position: Sitting, Cuff Size: Large Adult)   Pulse 85   Temp 98.4 °F (36.9 °C)   Resp 18   Ht 167.6 cm (65.98\")   Wt 102 kg (225 lb 9.6 oz)   SpO2 95%   BMI 36.43 kg/m²    Estimated " "body mass index is 36.43 kg/m² as calculated from the following:    Height as of this encounter: 167.6 cm (65.98\").    Weight as of this encounter: 102 kg (225 lb 9.6 oz).   Physical Exam  Constitutional:       General: He is not in acute distress.  HENT:      Head: Normocephalic and atraumatic.      Mouth/Throat:      Mouth: Mucous membranes are moist.      Pharynx: Posterior oropharyngeal erythema present. No oropharyngeal exudate.   Eyes:      Conjunctiva/sclera: Conjunctivae normal.   Cardiovascular:      Rate and Rhythm: Normal rate and regular rhythm.      Heart sounds: Normal heart sounds.   Pulmonary:      Breath sounds: Normal breath sounds. No wheezing, rhonchi or rales.   Lymphadenopathy:      Cervical: No cervical adenopathy.   Neurological:      General: No focal deficit present.      Mental Status: He is alert.        Result Review :  The following data was reviewed by: LETI Bell on 07/15/2024: POCT: COVID test positive.  Influenza A and B negative.             Assessment and Plan   Diagnoses and all orders for this visit:    1. COVID-19 virus infection (Primary)    Other orders  -     benzonatate (TESSALON) 200 MG capsule; Take 1 capsule by mouth 3 (Three) Times a Day As Needed for Cough.  Dispense: 30 capsule; Refill: 0  -     Nirmatrelvir & Ritonavir, 300mg/100mg, (PAXLOVID); Take 3 tablets by mouth 2 (Two) Times a Day for 5 days. Hold cholesterol med while taking this med.  Dispense: 30 tablet; Refill: 0    The patient will start Paxlovid twice daily for 5 days.  I have advised him to hold his Rosuvastatin while taking this medication.    Use Tessalon Perles 200 mg as needed cough 3 times daily, antihistamine for runny nose and Tylenol for pain and fever.  Rest and hydrate.    The patient is to seek emergency medical treatment for worsening of symptoms, chest pain, high fever greater than 102 F or difficulty breathing. Risk with COVID infection is development of pneumonia.    Education " on diagnosis, medication and treatment plan.  The patient agrees to this plan.    Patient was given instructions and counseling regarding his condition. Please see specific information pulled into the AVS if appropriate.     Follow Up   If not improving or worsening the patient was instructed to follow-up.    Dictated Utilizing Dragon Dictation.  Please note that portions of this note were completed with a voice recognition program.  Part of this note may be an electronic transcription/translation of spoken language to printed text using the Dragon Dictation System.    LETI Bell

## 2024-08-21 ENCOUNTER — TELEPHONE (OUTPATIENT)
Dept: INTERNAL MEDICINE | Age: 69
End: 2024-08-21
Payer: MEDICARE

## 2024-08-21 DIAGNOSIS — E29.1 HYPOGONADISM IN MALE: Primary | ICD-10-CM

## 2024-08-22 RX ORDER — TESTOSTERONE CYPIONATE 200 MG/ML
200 INJECTION, SOLUTION INTRAMUSCULAR
Qty: 2 ML | Refills: 5 | Status: SHIPPED | OUTPATIENT
Start: 2024-08-22

## 2024-09-09 ENCOUNTER — OFFICE VISIT (OUTPATIENT)
Dept: INTERNAL MEDICINE | Age: 69
End: 2024-09-09
Payer: MEDICARE

## 2024-09-09 VITALS
HEART RATE: 68 BPM | BODY MASS INDEX: 36.26 KG/M2 | TEMPERATURE: 98.7 F | OXYGEN SATURATION: 95 % | SYSTOLIC BLOOD PRESSURE: 146 MMHG | RESPIRATION RATE: 18 BRPM | WEIGHT: 225.6 LBS | HEIGHT: 66 IN | DIASTOLIC BLOOD PRESSURE: 92 MMHG

## 2024-09-09 DIAGNOSIS — J06.9 ACUTE URI: Primary | ICD-10-CM

## 2024-09-09 PROCEDURE — 1160F RVW MEDS BY RX/DR IN RCRD: CPT

## 2024-09-09 PROCEDURE — 3080F DIAST BP >= 90 MM HG: CPT

## 2024-09-09 PROCEDURE — 3077F SYST BP >= 140 MM HG: CPT

## 2024-09-09 PROCEDURE — 1126F AMNT PAIN NOTED NONE PRSNT: CPT

## 2024-09-09 PROCEDURE — 1159F MED LIST DOCD IN RCRD: CPT

## 2024-09-09 PROCEDURE — 99213 OFFICE O/P EST LOW 20 MIN: CPT

## 2024-09-09 RX ORDER — PREDNISONE 20 MG/1
40 TABLET ORAL DAILY
Qty: 10 TABLET | Refills: 0 | Status: SHIPPED | OUTPATIENT
Start: 2024-09-09 | End: 2024-09-14

## 2024-09-09 RX ORDER — BENZONATATE 200 MG/1
200 CAPSULE ORAL 3 TIMES DAILY PRN
Qty: 30 CAPSULE | Refills: 0 | Status: SHIPPED | OUTPATIENT
Start: 2024-09-09

## 2024-09-09 NOTE — PROGRESS NOTES
Chief Complaint  Cough (69 year old male here today complaining of a productive cough with yellow sputum, headaches and body aches for about 3 weeks . Denies sneezing or fevers. )    History of Present Illness  SUBJECTIVE  Dung Burch presents to [facility] with complaints of productive cough, scratchy throat, congestion, headaches, myalgias for about 3 weeks.   He denies fever, chills, nausea, vomiting, diarrhea.  He has not taken anything OTC.       Past Medical History:   Diagnosis Date    Cervical root disorders, not elsewhere classified     Essential (primary) hypertension     ETOH abuse     Hairy cell leukemia     High cholesterol     Impaired fasting glucose     Pure hypercholesterolemia     Sleep apnea, unspecified     Transient cerebral ischemic attack, unspecified     Vitamin D deficiency, unspecified       Family History   Problem Relation Age of Onset    Cancer Mother     Cancer Father     Hyperlipidemia Father     COPD Father     Cancer Paternal Grandfather       Past Surgical History:   Procedure Laterality Date    CHOLECYSTECTOMY  08/2019    ENDOSCOPY AND COLONOSCOPY  2008    KNEE SURGERY Right     OTHER SURGICAL HISTORY      TUBAL ADENOMA    OTHER SURGICAL HISTORY Left     CHEEK    THYROID CYST EXCISION  2000        Current Outpatient Medications:     aspirin 81 MG chewable tablet, aspirin 81 mg oral tablet,chewable chew 1 tablet (81 mg) by oral route once daily   Active, Disp: , Rfl:     benzonatate (TESSALON) 200 MG capsule, Take 1 capsule by mouth 3 (Three) Times a Day As Needed for Cough., Disp: 30 capsule, Rfl: 0    buPROPion XL (Wellbutrin XL) 300 MG 24 hr tablet, Take 1 tablet by mouth Daily., Disp: 90 tablet, Rfl: 3    cholecalciferol (VITAMIN D3) 25 MCG (1000 UT) tablet, , Disp: , Rfl:     citalopram (CeleXA) 20 MG tablet, Take 1 tablet by mouth Daily., Disp: 90 tablet, Rfl: 1    lisinopril (PRINIVIL,ZESTRIL) 40 MG tablet, TAKE 1 TABLET BY MOUTH EVERY  "DAY, Disp: 90 tablet, Rfl: 1    loratadine (CLARITIN) 10 MG tablet, loratadine 10 mg oral tablet take 1 tablet (10 mg) by oral route once daily   Active, Disp: , Rfl:     metFORMIN (GLUCOPHAGE) 500 MG tablet, Take 1 tablet by mouth 2 (Two) Times a Day With Meals., Disp: 60 tablet, Rfl: 5    quinapril (ACCUPRIL) 40 MG tablet, Every 12 (Twelve) Hours., Disp: , Rfl:     rosuvastatin (CRESTOR) 10 MG tablet, TAKE 1 TABLET BY MOUTH EVERY DAY, Disp: 90 tablet, Rfl: 3    spironolactone (ALDACTONE) 25 MG tablet, TAKE 1 TABLET BY MOUTH EVERY DAY, Disp: 90 tablet, Rfl: 3    Testosterone Cypionate (Depo-Testosterone) 200 MG/ML injection, Inject 1 mL into the appropriate muscle as directed by prescriber Every 14 (Fourteen) Days., Disp: 2 mL, Rfl: 5    Testosterone Cypionate (DEPOTESTOTERONE CYPIONATE) 200 MG/ML injection, INJECT 1 ML SUBCUTANEOUSLY AS DIRECTED EVERY 2 WEEKS, Disp: 2 mL, Rfl: 5    Tirzepatide-Weight Management (ZEPBOUND) 2.5 MG/0.5ML solution auto-injector, Inject 0.5 mL under the skin into the appropriate area as directed 1 (One) Time Per Week., Disp: 2 mL, Rfl: 5    amoxicillin-clavulanate (AUGMENTIN) 875-125 MG per tablet, Take 1 tablet by mouth 2 (Two) Times a Day for 10 days. Take with food., Disp: 20 tablet, Rfl: 0    predniSONE (DELTASONE) 20 MG tablet, Take 2 tablets by mouth Daily for 5 days., Disp: 10 tablet, Rfl: 0    Current Facility-Administered Medications:     Testosterone Cypionate (DEPOTESTOTERONE CYPIONATE) injection 200 mg, 200 mg, Intramuscular, Weekly, Isaac Matta MD, 200 mg at 04/05/24 1459    OBJECTIVE  Vital Signs:   /92 (BP Location: Left arm, Patient Position: Sitting)   Pulse 68   Temp 98.7 °F (37.1 °C) (Temporal)   Resp 18   Ht 167.6 cm (65.98\")   Wt 102 kg (225 lb 9.6 oz)   SpO2 95%   BMI 36.43 kg/m²    Estimated body mass index is 36.43 kg/m² as calculated from the following:    Height as of this encounter: 167.6 cm (65.98\").    Weight as of this encounter: " 102 kg (225 lb 9.6 oz).     Wt Readings from Last 3 Encounters:   09/09/24 102 kg (225 lb 9.6 oz)   07/15/24 102 kg (225 lb 9.6 oz)   05/02/24 102 kg (225 lb)     BP Readings from Last 3 Encounters:   09/09/24 146/92   07/15/24 148/78   05/02/24 144/83       Physical Exam  Vitals and nursing note reviewed.   Constitutional:       Appearance: Normal appearance.   HENT:      Head: Normocephalic.      Right Ear: Tympanic membrane normal.      Left Ear: Tympanic membrane normal.      Nose: Congestion present.      Right Sinus: No maxillary sinus tenderness or frontal sinus tenderness.      Left Sinus: No maxillary sinus tenderness or frontal sinus tenderness.   Eyes:      Extraocular Movements: Extraocular movements intact.      Conjunctiva/sclera: Conjunctivae normal.   Cardiovascular:      Rate and Rhythm: Normal rate and regular rhythm.      Heart sounds: Normal heart sounds. No murmur heard.  Pulmonary:      Effort: Pulmonary effort is normal.      Breath sounds: Normal breath sounds. No wheezing or rales.   Abdominal:      General: Bowel sounds are normal.      Palpations: Abdomen is soft.      Tenderness: There is no abdominal tenderness. There is no guarding.   Musculoskeletal:         General: No swelling. Normal range of motion.      Cervical back: Normal range of motion and neck supple.   Skin:     General: Skin is warm and dry.   Neurological:      General: No focal deficit present.      Mental Status: He is alert and oriented to person, place, and time. Mental status is at baseline.   Psychiatric:         Mood and Affect: Mood normal.         Behavior: Behavior normal.         Thought Content: Thought content normal.         Judgment: Judgment normal.          Result Review        No Images in the past 120 days found..     The above data has been reviewed by LETI Rangel 09/09/2024 14:04 EDT.          Patient Care Team:  Isaac Matta MD as PCP - General (Internal Medicine)  Dione Nayak  MD PhD as Consulting Physician (Hematology and Oncology)            ASSESSMENT & PLAN    Diagnoses and all orders for this visit:    1. Acute URI (Primary)  Assessment & Plan:  Patient complains of productive cough, scratchy throat, congestion, headaches and myalgias for about 3 weeks now.  Patient did have COVID back in July.  He denies any fever, chills, nausea, vomiting, diarrhea.  Due to persistent symptoms and presentation we will go ahead and treat with antibiotics and steroids.  Return to clinic if no improvement.    Orders:  -     predniSONE (DELTASONE) 20 MG tablet; Take 2 tablets by mouth Daily for 5 days.  Dispense: 10 tablet; Refill: 0  -     amoxicillin-clavulanate (AUGMENTIN) 875-125 MG per tablet; Take 1 tablet by mouth 2 (Two) Times a Day for 10 days. Take with food.  Dispense: 20 tablet; Refill: 0  -     benzonatate (TESSALON) 200 MG capsule; Take 1 capsule by mouth 3 (Three) Times a Day As Needed for Cough.  Dispense: 30 capsule; Refill: 0         Tobacco Use: Low Risk  (9/9/2024)    Patient History     Smoking Tobacco Use: Never     Smokeless Tobacco Use: Never     Passive Exposure: Never       Follow Up     Return if symptoms worsen or fail to improve.    Please note that portions of this note were completed with a voice recognition program.    Patient was given instructions and counseling regarding his condition or for health maintenance advice. Please see specific information pulled into the AVS if appropriate.   I have reviewed information obtained and documented by others and I have confirmed the accuracy of this documented note.    LETI Rangel

## 2024-09-09 NOTE — ASSESSMENT & PLAN NOTE
Patient complains of productive cough, scratchy throat, congestion, headaches and myalgias for about 3 weeks now.  Patient did have COVID back in July.  He denies any fever, chills, nausea, vomiting, diarrhea.  Due to persistent symptoms and presentation we will go ahead and treat with antibiotics and steroids.  Return to clinic if no improvement.

## 2024-09-19 ENCOUNTER — OFFICE VISIT (OUTPATIENT)
Dept: INTERNAL MEDICINE | Age: 69
End: 2024-09-19
Payer: MEDICARE

## 2024-09-19 VITALS
HEART RATE: 94 BPM | WEIGHT: 224.6 LBS | OXYGEN SATURATION: 96 % | DIASTOLIC BLOOD PRESSURE: 101 MMHG | SYSTOLIC BLOOD PRESSURE: 157 MMHG | BODY MASS INDEX: 36.27 KG/M2 | TEMPERATURE: 98.4 F

## 2024-09-19 DIAGNOSIS — J30.1 SEASONAL ALLERGIC RHINITIS DUE TO POLLEN: ICD-10-CM

## 2024-09-19 DIAGNOSIS — J40 BRONCHITIS: Primary | ICD-10-CM

## 2024-09-19 DIAGNOSIS — I10 PRIMARY HYPERTENSION: ICD-10-CM

## 2024-09-19 PROCEDURE — 3080F DIAST BP >= 90 MM HG: CPT | Performed by: INTERNAL MEDICINE

## 2024-09-19 PROCEDURE — 99213 OFFICE O/P EST LOW 20 MIN: CPT | Performed by: INTERNAL MEDICINE

## 2024-09-19 PROCEDURE — 3077F SYST BP >= 140 MM HG: CPT | Performed by: INTERNAL MEDICINE

## 2024-09-19 PROCEDURE — 1126F AMNT PAIN NOTED NONE PRSNT: CPT | Performed by: INTERNAL MEDICINE

## 2024-09-19 RX ORDER — AZITHROMYCIN 250 MG/1
TABLET, FILM COATED ORAL
Qty: 6 TABLET | Refills: 0 | Status: SHIPPED | OUTPATIENT
Start: 2024-09-19

## 2024-09-19 RX ORDER — HYDROCODONE POLISTIREX AND CHLORPHENIRAMINE POLISTIREX 10; 8 MG/5ML; MG/5ML
5 SUSPENSION, EXTENDED RELEASE ORAL EVERY 12 HOURS PRN
Qty: 100 ML | Refills: 0 | Status: SHIPPED | OUTPATIENT
Start: 2024-09-19

## 2024-09-19 RX ORDER — AMLODIPINE BESYLATE 2.5 MG/1
2.5 TABLET ORAL DAILY
Qty: 30 TABLET | Refills: 1 | Status: SHIPPED | OUTPATIENT
Start: 2024-09-19

## 2024-09-19 RX ORDER — FLUTICASONE PROPIONATE 50 MCG
SPRAY, SUSPENSION (ML) NASAL
Qty: 16 G | Refills: 1 | Status: SHIPPED | OUTPATIENT
Start: 2024-09-19

## 2024-09-19 RX ORDER — MONTELUKAST SODIUM 10 MG/1
10 TABLET ORAL NIGHTLY
Qty: 30 TABLET | Refills: 1 | Status: SHIPPED | OUTPATIENT
Start: 2024-09-19

## 2024-10-30 ENCOUNTER — OFFICE VISIT (OUTPATIENT)
Dept: INTERNAL MEDICINE | Age: 69
End: 2024-10-30
Payer: MEDICARE

## 2024-10-30 VITALS
BODY MASS INDEX: 36.96 KG/M2 | SYSTOLIC BLOOD PRESSURE: 164 MMHG | TEMPERATURE: 98.2 F | DIASTOLIC BLOOD PRESSURE: 91 MMHG | HEIGHT: 66 IN | WEIGHT: 230 LBS | OXYGEN SATURATION: 92 % | HEART RATE: 88 BPM

## 2024-10-30 DIAGNOSIS — J01.00 ACUTE NON-RECURRENT MAXILLARY SINUSITIS: Primary | ICD-10-CM

## 2024-10-30 DIAGNOSIS — K14.1 GLOSSITIS, BENIGN MIGRATORY: ICD-10-CM

## 2024-10-30 PROCEDURE — 1126F AMNT PAIN NOTED NONE PRSNT: CPT | Performed by: INTERNAL MEDICINE

## 2024-10-30 PROCEDURE — 3077F SYST BP >= 140 MM HG: CPT | Performed by: INTERNAL MEDICINE

## 2024-10-30 PROCEDURE — 3080F DIAST BP >= 90 MM HG: CPT | Performed by: INTERNAL MEDICINE

## 2024-10-30 PROCEDURE — 99213 OFFICE O/P EST LOW 20 MIN: CPT | Performed by: INTERNAL MEDICINE

## 2024-10-30 RX ORDER — PREDNISONE 20 MG/1
40 TABLET ORAL DAILY
Qty: 12 TABLET | Refills: 0 | Status: SHIPPED | OUTPATIENT
Start: 2024-10-30

## 2024-10-30 NOTE — PROGRESS NOTES
"CHIEF COMPLAINT/ HPI:  Sinusitis (Pt states chronic issues with sinuses, cough, fatigue and malaise. Pt has been seen with other providers and was given ABX therapy with no relief. )    General Malaise,, postnasal drip,          Objective   Vital Signs  Vitals:    10/30/24 1214   BP: 164/91   BP Location: Left arm   Patient Position: Sitting   Pulse: 88   Temp: 98.2 °F (36.8 °C)   SpO2: 92%   Weight: 104 kg (230 lb)   Height: 167.6 cm (65.98\")      Body mass index is 37.14 kg/m².  Review of Systems not feeling well general malaise cough nasal drainage, pressure, feels sluggish,  Physical Exam patient has a couple skin lesions 1 that is bandaged on the right side of the nose and 1 on the chin that is ulcerated slightly with scabbed, his lungs are clear posterior and anterior cardiac exam reveals a regular rhythm without appreciable murmur neck is supple throat shows migratory glossitis on the tongue, otherwise unremarkable, ears show some dullness to the left tympanic membrane right is normal, his nose shows marked mucosal edema or erythema, and some yellowish mucus,  Result Review :   No results found for: \"PROBNP\", \"BNP\"    CBC w/diff          2/12/2024    13:25   CBC w/Diff   WBC 5.11       RBC 4.90       Hemoglobin 15.3       Hematocrit 44.2       MCV 90.2       MCH 31.2       MCHC 34.6       RDW 13.0       Platelets 184       Neutrophil Rel % 68.2       Immature Granulocyte Rel % 1.0       Lymphocyte Rel % 16.4       Monocyte Rel % 11.5       Eosinophil Rel % 2.3       Basophil Rel % 0.6          Details          This result is from an external source.                 Lab Results   Component Value Date    TSH 0.920 06/28/2023    TSH 0.671 03/27/2023    TSH 0.847 01/05/2023      Lab Results   Component Value Date    FREET4 1.26 06/28/2023    FREET4 1.17 03/27/2023    FREET4 1.15 01/05/2023                          Visit Diagnoses:    ICD-10-CM ICD-9-CM   1. Acute non-recurrent maxillary sinusitis  J01.00 461.0 "   2. Glossitis, benign migratory  K14.1 529.1       Assessment and Plan   Diagnoses and all orders for this visit:    1. Acute non-recurrent maxillary sinusitis (Primary)    2. Glossitis, benign migratory    Other orders  -     amoxicillin-clavulanate (AUGMENTIN) 875-125 MG per tablet; Take 1 tablet by mouth 2 (Two) Times a Day.  Dispense: 20 tablet; Refill: 0  -     predniSONE (DELTASONE) 20 MG tablet; Take 2 tablets by mouth Daily.  Dispense: 12 tablet; Refill: 0        Acute sinusitis, treatment options discussed October 30, 2024 will start with Augmentin, 875 twice a day for 10 days, prednisone for 6 days help with the pressure sinuses,    Migratory glossitis treatment options discussed,    Skin lesions chin and nose for Mohs surgery coming up in dermatology follow-up             Follow Up   No follow-ups on file.  Patient was given instructions and counseling regarding his condition or for health maintenance advice. Please see specific information pulled into the AVS if appropriate.

## 2024-11-02 DIAGNOSIS — R73.01 IMPAIRED FASTING GLUCOSE: Primary | ICD-10-CM

## 2024-11-08 ENCOUNTER — HOSPITAL ENCOUNTER (EMERGENCY)
Facility: HOSPITAL | Age: 69
Discharge: HOME | End: 2024-11-08
Attending: EMERGENCY MEDICINE
Payer: MEDICARE

## 2024-11-08 ENCOUNTER — APPOINTMENT (OUTPATIENT)
Dept: CT IMAGING | Facility: HOSPITAL | Age: 69
End: 2024-11-08
Payer: MEDICARE

## 2024-11-08 VITALS
WEIGHT: 233.25 LBS | OXYGEN SATURATION: 94 % | SYSTOLIC BLOOD PRESSURE: 154 MMHG | HEART RATE: 79 BPM | HEIGHT: 66 IN | RESPIRATION RATE: 18 BRPM | DIASTOLIC BLOOD PRESSURE: 79 MMHG | TEMPERATURE: 97.8 F | BODY MASS INDEX: 37.49 KG/M2

## 2024-11-08 DIAGNOSIS — K04.7 PERIAPICAL ABSCESS: Primary | ICD-10-CM

## 2024-11-08 DIAGNOSIS — R22.0 FACIAL SWELLING: ICD-10-CM

## 2024-11-08 LAB
ALBUMIN SERPL-MCNC: 4.2 G/DL (ref 3.5–5.2)
ALBUMIN/GLOB SERPL: 1.7 G/DL
ALP SERPL-CCNC: 56 U/L (ref 39–117)
ALT SERPL W P-5'-P-CCNC: 30 U/L (ref 1–41)
ANION GAP SERPL CALCULATED.3IONS-SCNC: 9.9 MMOL/L (ref 5–15)
AST SERPL-CCNC: 22 U/L (ref 1–40)
BASOPHILS # BLD AUTO: 0.04 10*3/MM3 (ref 0–0.2)
BASOPHILS NFR BLD AUTO: 0.6 % (ref 0–1.5)
BILIRUB SERPL-MCNC: 0.3 MG/DL (ref 0–1.2)
BUN SERPL-MCNC: 18 MG/DL (ref 8–23)
BUN/CREAT SERPL: 16.1 (ref 7–25)
CALCIUM SPEC-SCNC: 8.7 MG/DL (ref 8.6–10.5)
CHLORIDE SERPL-SCNC: 102 MMOL/L (ref 98–107)
CO2 SERPL-SCNC: 25.1 MMOL/L (ref 22–29)
CREAT SERPL-MCNC: 1.12 MG/DL (ref 0.76–1.27)
DEPRECATED RDW RBC AUTO: 46.9 FL (ref 37–54)
EGFRCR SERPLBLD CKD-EPI 2021: 71.1 ML/MIN/1.73
EOSINOPHIL # BLD AUTO: 0.19 10*3/MM3 (ref 0–0.4)
EOSINOPHIL NFR BLD AUTO: 2.8 % (ref 0.3–6.2)
ERYTHROCYTE [DISTWIDTH] IN BLOOD BY AUTOMATED COUNT: 13.6 % (ref 12.3–15.4)
GLOBULIN UR ELPH-MCNC: 2.5 GM/DL
GLUCOSE SERPL-MCNC: 102 MG/DL (ref 65–99)
HCT VFR BLD AUTO: 44.8 % (ref 37.5–51)
HGB BLD-MCNC: 15.7 G/DL (ref 13–17.7)
HOLD SPECIMEN: NORMAL
IMM GRANULOCYTES # BLD AUTO: 0.09 10*3/MM3 (ref 0–0.05)
IMM GRANULOCYTES NFR BLD AUTO: 1.3 % (ref 0–0.5)
LYMPHOCYTES # BLD AUTO: 0.96 10*3/MM3 (ref 0.7–3.1)
LYMPHOCYTES NFR BLD AUTO: 14.2 % (ref 19.6–45.3)
MCH RBC QN AUTO: 33.1 PG (ref 26.6–33)
MCHC RBC AUTO-ENTMCNC: 35 G/DL (ref 31.5–35.7)
MCV RBC AUTO: 94.5 FL (ref 79–97)
MONOCYTES # BLD AUTO: 0.78 10*3/MM3 (ref 0.1–0.9)
MONOCYTES NFR BLD AUTO: 11.6 % (ref 5–12)
NEUTROPHILS NFR BLD AUTO: 4.69 10*3/MM3 (ref 1.7–7)
NEUTROPHILS NFR BLD AUTO: 69.5 % (ref 42.7–76)
NRBC BLD AUTO-RTO: 0 /100 WBC (ref 0–0.2)
PLATELET # BLD AUTO: 202 10*3/MM3 (ref 140–450)
PMV BLD AUTO: 9.2 FL (ref 6–12)
POTASSIUM SERPL-SCNC: 4.4 MMOL/L (ref 3.5–5.2)
PROT SERPL-MCNC: 6.7 G/DL (ref 6–8.5)
RBC # BLD AUTO: 4.74 10*6/MM3 (ref 4.14–5.8)
SODIUM SERPL-SCNC: 137 MMOL/L (ref 136–145)
WBC NRBC COR # BLD AUTO: 6.75 10*3/MM3 (ref 3.4–10.8)
WHOLE BLOOD HOLD COAG: NORMAL

## 2024-11-08 PROCEDURE — 80053 COMPREHEN METABOLIC PANEL: CPT

## 2024-11-08 PROCEDURE — 25010000002 CEFTRIAXONE PER 250 MG

## 2024-11-08 PROCEDURE — 70487 CT MAXILLOFACIAL W/DYE: CPT

## 2024-11-08 PROCEDURE — 85025 COMPLETE CBC W/AUTO DIFF WBC: CPT

## 2024-11-08 PROCEDURE — 99285 EMERGENCY DEPT VISIT HI MDM: CPT

## 2024-11-08 PROCEDURE — 96374 THER/PROPH/DIAG INJ IV PUSH: CPT

## 2024-11-08 PROCEDURE — 25510000001 IOPAMIDOL PER 1 ML: Performed by: EMERGENCY MEDICINE

## 2024-11-08 RX ORDER — HYDROCODONE BITARTRATE AND ACETAMINOPHEN 5; 325 MG/1; MG/1
1 TABLET ORAL ONCE
Status: COMPLETED | OUTPATIENT
Start: 2024-11-08 | End: 2024-11-08

## 2024-11-08 RX ORDER — IOPAMIDOL 755 MG/ML
100 INJECTION, SOLUTION INTRAVASCULAR
Status: COMPLETED | OUTPATIENT
Start: 2024-11-08 | End: 2024-11-08

## 2024-11-08 RX ADMIN — IOPAMIDOL 100 ML: 755 INJECTION, SOLUTION INTRAVENOUS at 15:28

## 2024-11-08 RX ADMIN — HYDROCODONE BITARTRATE AND ACETAMINOPHEN 1 TABLET: 5; 325 TABLET ORAL at 14:09

## 2024-11-08 RX ADMIN — SODIUM CHLORIDE 2000 MG: 9 INJECTION INTRAMUSCULAR; INTRAVENOUS; SUBCUTANEOUS at 14:09

## 2024-11-08 NOTE — ED PROVIDER NOTES
Time: 12:20 PM EST  Date of encounter:  11/8/2024  Room number: 23/23  Independent Historian/Clinical History and Information was obtained by:   Patient    History is limited by: N/A    Chief Complaint: wound infection       History of Present Illness:  Patient is a 69 y.o. year old male who presents to the emergency department for evaluation of facial swelling secondary to recent surgery for procedure.  Patient is concerned about possible infection.  On Tuesday patient had a Mohs procedure completed on the right side of his face near nasal fold.  He then started developing swelling on Thursday.  Swelling extends up around right orbital area and is puffy in nature.  There is no drainage to actual surgical incision site.  He has had no fevers, no nausea, vomiting, or diarrhea.  He was taking amoxicillin and called his dermatologist concerned about the swelling and possible infection.  That physician they called him in a another antibiotic, cephalexin, and the patient has not started it as of today.  He does report pain.     HPI    Patient Care Team  Primary Care Provider: Isaac Matta MD    Past Medical History:     No Known Allergies  Past Medical History:   Diagnosis Date    Cervical root disorders, not elsewhere classified     Essential (primary) hypertension     ETOH abuse     Hairy cell leukemia     High cholesterol     Impaired fasting glucose     Pure hypercholesterolemia     Sleep apnea, unspecified     Transient cerebral ischemic attack, unspecified     Vitamin D deficiency, unspecified      Past Surgical History:   Procedure Laterality Date    CHOLECYSTECTOMY  08/2019    ENDOSCOPY AND COLONOSCOPY  2008    KNEE SURGERY Right     OTHER SURGICAL HISTORY      TUBAL ADENOMA    OTHER SURGICAL HISTORY Left     CHEEK    THYROID CYST EXCISION  2000     Family History   Problem Relation Age of Onset    Cancer Mother     Cancer Father     Hyperlipidemia Father     COPD Father     Cancer Paternal Grandfather         Home Medications:  Prior to Admission medications    Medication Sig Start Date End Date Taking? Authorizing Provider   amLODIPine (NORVASC) 2.5 MG tablet Take 1 tablet by mouth Daily. 9/19/24   Zainab Tobin MD   aspirin 81 MG chewable tablet aspirin 81 mg oral tablet,chewable chew 1 tablet (81 mg) by oral route once daily   Active    ProviderSaqib MD   buPROPion XL (Wellbutrin XL) 300 MG 24 hr tablet Take 1 tablet by mouth Daily. 3/20/24   Isaac Matta MD   cholecalciferol (VITAMIN D3) 25 MCG (1000 UT) tablet     ProviderSaqib MD   citalopram (CeleXA) 20 MG tablet Take 1 tablet by mouth Daily. 3/19/24   Isaac Matta MD   fluticasone (FLONASE) 50 MCG/ACT nasal spray One -two sprays to each nostril daily 9/19/24   Zainab Tobin MD   lisinopril (PRINIVIL,ZESTRIL) 40 MG tablet TAKE 1 TABLET BY MOUTH EVERY DAY 5/28/24   Isaac Matta MD   loratadine (CLARITIN) 10 MG tablet loratadine 10 mg oral tablet take 1 tablet (10 mg) by oral route once daily   Active    ProviderSaqib MD   metFORMIN (GLUCOPHAGE) 500 MG tablet TAKE 1 TABLET BY MOUTH TWICE DAILY WITH MEALS 11/4/24   Isaac Matta MD   montelukast (Singulair) 10 MG tablet Take 1 tablet by mouth Every Night. 9/19/24   Zainab Tobin MD   predniSONE (DELTASONE) 20 MG tablet Take 2 tablets by mouth Daily. 10/30/24   Isaac Matta MD   quinapril (ACCUPRIL) 40 MG tablet Every 12 (Twelve) Hours.    ProviderSaqib MD   rosuvastatin (CRESTOR) 10 MG tablet TAKE 1 TABLET BY MOUTH EVERY DAY 1/23/24   Isaac Matta MD   spironolactone (ALDACTONE) 25 MG tablet TAKE 1 TABLET BY MOUTH EVERY DAY 1/23/24   Isaac Matta MD   Testosterone Cypionate (DEPOTESTOTERONE CYPIONATE) 200 MG/ML injection INJECT 1 ML SUBCUTANEOUSLY AS DIRECTED EVERY 2 WEEKS 8/3/23   Isaac Matta MD   amoxicillin-clavulanate (AUGMENTIN)  "875-125 MG per tablet Take 1 tablet by mouth 2 (Two) Times a Day. 10/30/24 11/8/24  Isaac Matta MD        Social History:   Social History     Tobacco Use    Smoking status: Never     Passive exposure: Never    Smokeless tobacco: Never   Vaping Use    Vaping status: Never Used   Substance Use Topics    Alcohol use: Yes     Alcohol/week: 3.0 standard drinks of alcohol     Types: 3 Cans of beer per week     Comment: 3 per day    Drug use: Never         Review of Systems:  Review of Systems   Constitutional:  Negative for chills and fever.   HENT:  Negative for congestion, ear pain and sore throat.    Eyes:  Negative for photophobia, pain and redness.   Respiratory:  Negative for cough, chest tightness and shortness of breath.    Cardiovascular:  Negative for chest pain.   Gastrointestinal:  Negative for abdominal pain, diarrhea, nausea and vomiting.   Genitourinary:  Negative for flank pain and hematuria.   Musculoskeletal:  Negative for joint swelling.   Skin:  Negative for pallor.        Surgical incision , swelling   Neurological:  Negative for seizures and headaches.   All other systems reviewed and are negative.       Physical Exam:  /79 (BP Location: Left arm, Patient Position: Sitting)   Pulse 79   Temp 97.8 °F (36.6 °C) (Oral)   Resp 18   Ht 167.6 cm (66\")   Wt 106 kg (233 lb 4 oz)   SpO2 94%   BMI 37.65 kg/m²     Physical Exam   General:  Awake alert no apparent distress    Head: Normocephalic, atraumatic, eyes PERRLA EOMI, nose normal, oropharynx normal. There is a healing surgical incision to right side of nasal fold.  There is no drainage to actual incision site.  There is localized swelling around incision that extends up around the right orbital area.  Area is puffy and fluctuant, soft in nature.  There is no obvious abscess.  No drainage from the eye.  No headache.  Edematous area is tender on exam.    Neck: Supple, no meningismus, no cervical lymphadenopathy or JVD    Heart: " Regular rate and rhythm, no murmurs or rubs, 2+ radial pulses    Lungs: Clear to auscultation bilaterally, no wheezing or rhonchi or rales, no respiratory distress    Abdomen: Soft, nontender, nondistended, no signs of peritonitis    Skin: Warm, dry, no rash    Musculoskeletal: Normal range of motion, no obvious deformities, no edema noted    Neurologic: Awake, alert, oriented x 3, no motor or sensory deficits appreciated    Psych: No suicidal or homicidal ideations, no psychosis          Procedures:  Procedures      Medical Decision Making:      Comorbidities that affect care:    Hypertension, high cholesterol, vitamin D deficiency, EtOH abuse, skin    External Notes reviewed:          The following orders were placed and all results were independently analyzed by me:  Orders Placed This Encounter   Procedures    CT Facial Bones With Contrast    Comprehensive Metabolic Panel    CBC Auto Differential    CBC & Differential    Extra Tubes    Gold Top - SST    Light Blue Top       Medications Given in the Emergency Department:  Medications   HYDROcodone-acetaminophen (NORCO) 5-325 MG per tablet 1 tablet (1 tablet Oral Given 11/8/24 1409)   cefTRIAXone (ROCEPHIN) in NS 2 GRAMS/20ml IV PUSH syringe (2,000 mg Intravenous Given 11/8/24 1409)   iopamidol (ISOVUE-370) 76 % injection 100 mL (100 mL Intravenous Given 11/8/24 1528)        ED Course:    ED Course as of 11/08/24 2051 Fri Nov 08, 2024   1502 Nursing staff consulted about the status of CT scan being completed.  She is contacting CT department at this time. [MS]      ED Course User Index  [MS] Ariela Cates, LETI       Labs:    Lab Results (last 24 hours)       Procedure Component Value Units Date/Time    CBC & Differential [[id number]]  (Abnormal) Collected: 11/08/24 1404    Specimen: Blood Updated: 11/08/24 1410    Narrative:      The following orders were created for panel order CBC & Differential.  Procedure                               Abnormality          Status                     ---------                               -----------         ------                     CBC Auto Differential[[id number]]        Abnormal            Final result                 Please view results for these tests on the individual orders.    Comprehensive Metabolic Panel [[id number]]  (Abnormal) Collected: 11/08/24 1404    Specimen: Blood Updated: 11/08/24 1458     Glucose 102 mg/dL      BUN 18 mg/dL      Creatinine 1.12 mg/dL      Sodium 137 mmol/L      Potassium 4.4 mmol/L      Comment: Slight hemolysis detected by analyzer. Result may be falsely elevated.        Chloride 102 mmol/L      CO2 25.1 mmol/L      Calcium 8.7 mg/dL      Total Protein 6.7 g/dL      Albumin 4.2 g/dL      ALT (SGPT) 30 U/L      AST (SGOT) 22 U/L      Alkaline Phosphatase 56 U/L      Total Bilirubin 0.3 mg/dL      Globulin 2.5 gm/dL      A/G Ratio 1.7 g/dL      BUN/Creatinine Ratio 16.1     Anion Gap 9.9 mmol/L      eGFR 71.1 mL/min/1.73     Narrative:      GFR Normal >60  Chronic Kidney Disease <60  Kidney Failure <15      CBC Auto Differential [[id number]]  (Abnormal) Collected: 11/08/24 1404    Specimen: Blood Updated: 11/08/24 1410     WBC 6.75 10*3/mm3      RBC 4.74 10*6/mm3      Hemoglobin 15.7 g/dL      Hematocrit 44.8 %      MCV 94.5 fL      MCH 33.1 pg      MCHC 35.0 g/dL      RDW 13.6 %      RDW-SD 46.9 fl      MPV 9.2 fL      Platelets 202 10*3/mm3      Neutrophil % 69.5 %      Lymphocyte % 14.2 %      Monocyte % 11.6 %      Eosinophil % 2.8 %      Basophil % 0.6 %      Immature Grans % 1.3 %      Neutrophils, Absolute 4.69 10*3/mm3      Lymphocytes, Absolute 0.96 10*3/mm3      Monocytes, Absolute 0.78 10*3/mm3      Eosinophils, Absolute 0.19 10*3/mm3      Basophils, Absolute 0.04 10*3/mm3      Immature Grans, Absolute 0.09 10*3/mm3      nRBC 0.0 /100 WBC              Imaging:    CT Facial Bones With Contrast    Result Date: 11/8/2024  CT FACIAL BONES W CONTRAST Date of Exam: 11/8/2024 3:18 PM EST  Indication: right sided facial and orbital swelling, Mohs procedure wednesday.. Comparison: None available. Technique: Axial CT images were obtained from the inferior aspect of the mandible through the frontal sinuses after the uneventful intravenous administration of iodinated contrast.  Reconstructed coronal and sagittal images were also obtained. Automated exposure control and iterative construction methods were used. Findings: The hyoid bone is intact. The mandible is intact. The maxilla is intact. Mucoid retention cysts or polyps of the right maxillary sinus. Mucosal thickening of the left maxillary sinus. The bony margins of the orbits and maxillary sinuses are intact. The mandible is intact. The pterygoid plates are intact. Infratemporal fossa is normal on both sides. Bilateral lens replacements. Left periorbital soft tissue swelling. The floor of the anterior cranial fossa is normal. The nasal bones are intact.     No acute fractures. Periapical lucency of tooth #29 concerning for periapical dental abscess. Dental referral recommended. Electronically Signed: Jorge Alberto Mandel MD  11/8/2024 3:51 PM EST  Workstation ID: [id number]       Differential Diagnosis and Discussion:    Facial Pain/Swelling: Differential diagnosis includes but is not limited to temporal arteritis, intracranial tumors, neuralgias, dental disease, ocular disease, TMJ syndrome, salivary gland disorders, sinusitis, cluster headaches, migraines, and psychogenic.    All labs were reviewed and interpreted by me.  CT scan radiology impression was interpreted by me.    MDM                 Patient Care Considerations:    SEPSIS was considered but is NOT present in the emergency department as SIRS criteria is not present.      Consultants/Shared Management Plan:    None    Social Determinants of Health:    Patient is independent, reliable, and has access to care.       Disposition and Care Coordination:    Discharged: The patient is suitable and stable  for discharge with no need for consideration of admission.    I have explained the patient´s condition, diagnoses and treatment plan based on the information available to me at this time. I have answered questions and addressed any concerns. The patient has a good  understanding of the patient´s diagnosis, condition, and treatment plan as can be expected at this point. The vital signs have been stable. The patient´s condition is stable and appropriate for discharge from the emergency department.      The patient will pursue further outpatient evaluation with the primary care physician or other designated or consulting physician as outlined in the discharge instructions. They are agreeable to this plan of care and follow-up instructions have been explained in detail. The patient has received these instructions in written format and has expressed an understanding of the discharge instructions. The patient is aware that any significant change in condition or worsening of symptoms should prompt an immediate return to this or the closest emergency department or call to 911.    Final diagnoses:   Periapical abscess   Facial swelling        ED Disposition       ED Disposition   Discharge    Condition   Stable    Comment   --               This medical record created using voice recognition software.       Ariela Cates, APRN  11/08/24 7022

## 2024-11-08 NOTE — ED TRIAGE NOTES
Pt arrives to ED with complaints of possible infection to surgical site on right eye.. per pt he had a procedure done tuesday and went to UC today and was told that he might need IV antibiotics .. Pt denies fever but states its very painful     Pt wound is not draining in triage, pt has edema on the right side of face with right eye swollen shut

## 2024-11-08 NOTE — DISCHARGE INSTRUCTIONS
Please be sure to keep your prescheduled follow-up appointment with your dermatologist next week.  If you do not see any improvement in your swelling please call their office to see if you can have it moved to a closer date.  Your CT tonight did not show any obvious abscess surrounding the area where you recently had your procedure completed.  It did however show a possible dental abscess on your right mandible area.  You will need to follow-up with your dentist for this.  The antibiotics you are currently taking should help with the infection however to ensure that it has resolved you will need to see your dentist.  If it anytime you develop a fever that you cannot control with Tylenol or Motrin, difficulty swallowing, have a significant increase in swelling, have a change in vision in your right eye, or develop severe nausea, vomiting, or diarrhea please return to the ER.  Otherwise follow-up with your dermatologist and dentist

## 2024-11-20 ENCOUNTER — EXTERNAL PBMM DATA (OUTPATIENT)
Dept: PHARMACY | Facility: OTHER | Age: 69
End: 2024-11-20
Payer: MEDICARE

## 2024-12-02 RX ORDER — LISINOPRIL 40 MG/1
TABLET ORAL
Qty: 90 TABLET | Refills: 1 | Status: SHIPPED | OUTPATIENT
Start: 2024-12-02 | End: 2024-12-06 | Stop reason: SDUPTHER

## 2024-12-06 ENCOUNTER — TELEPHONE (OUTPATIENT)
Dept: INTERNAL MEDICINE | Age: 69
End: 2024-12-06
Payer: MEDICARE

## 2024-12-06 DIAGNOSIS — I10 PRIMARY HYPERTENSION: Primary | ICD-10-CM

## 2024-12-06 RX ORDER — LISINOPRIL 40 MG/1
40 TABLET ORAL DAILY
Qty: 90 TABLET | Refills: 1 | Status: SHIPPED | OUTPATIENT
Start: 2024-12-06

## 2024-12-13 ENCOUNTER — POP HEALTH PHARMACY (OUTPATIENT)
Dept: PHARMACY | Facility: OTHER | Age: 69
End: 2024-12-13
Payer: MEDICARE

## 2024-12-23 RX ORDER — SPIRONOLACTONE 25 MG/1
TABLET ORAL
Qty: 90 TABLET | Refills: 3 | Status: SHIPPED | OUTPATIENT
Start: 2024-12-23

## 2025-01-28 ENCOUNTER — OFFICE VISIT (OUTPATIENT)
Dept: INTERNAL MEDICINE | Age: 70
End: 2025-01-28
Payer: MEDICARE

## 2025-01-28 ENCOUNTER — HOSPITAL ENCOUNTER (OUTPATIENT)
Dept: GENERAL RADIOLOGY | Facility: HOSPITAL | Age: 70
Discharge: HOME | End: 2025-01-28
Admitting: INTERNAL MEDICINE
Payer: MEDICARE

## 2025-01-28 VITALS
SYSTOLIC BLOOD PRESSURE: 154 MMHG | TEMPERATURE: 99.1 F | WEIGHT: 235.8 LBS | BODY MASS INDEX: 37.9 KG/M2 | DIASTOLIC BLOOD PRESSURE: 88 MMHG | HEART RATE: 95 BPM | OXYGEN SATURATION: 97 % | HEIGHT: 66 IN

## 2025-01-28 DIAGNOSIS — R19.7 DIARRHEA, UNSPECIFIED TYPE: ICD-10-CM

## 2025-01-28 DIAGNOSIS — R50.9 FEBRILE ILLNESS, ACUTE: Primary | ICD-10-CM

## 2025-01-28 DIAGNOSIS — R06.02 SHORTNESS OF BREATH: ICD-10-CM

## 2025-01-28 DIAGNOSIS — R50.9 FEVER, UNSPECIFIED FEVER CAUSE: ICD-10-CM

## 2025-01-28 DIAGNOSIS — R52 BODY ACHES: ICD-10-CM

## 2025-01-28 DIAGNOSIS — I10 PRIMARY HYPERTENSION: ICD-10-CM

## 2025-01-28 PROCEDURE — 1160F RVW MEDS BY RX/DR IN RCRD: CPT | Performed by: INTERNAL MEDICINE

## 2025-01-28 PROCEDURE — 3079F DIAST BP 80-89 MM HG: CPT | Performed by: INTERNAL MEDICINE

## 2025-01-28 PROCEDURE — 1159F MED LIST DOCD IN RCRD: CPT | Performed by: INTERNAL MEDICINE

## 2025-01-28 PROCEDURE — 3077F SYST BP >= 140 MM HG: CPT | Performed by: INTERNAL MEDICINE

## 2025-01-28 PROCEDURE — 1126F AMNT PAIN NOTED NONE PRSNT: CPT | Performed by: INTERNAL MEDICINE

## 2025-01-28 PROCEDURE — 99213 OFFICE O/P EST LOW 20 MIN: CPT | Performed by: INTERNAL MEDICINE

## 2025-01-28 PROCEDURE — 71046 X-RAY EXAM CHEST 2 VIEWS: CPT

## 2025-01-28 PROCEDURE — 87428 SARSCOV & INF VIR A&B AG IA: CPT | Performed by: INTERNAL MEDICINE

## 2025-01-28 RX ORDER — PREDNISONE 20 MG/1
TABLET ORAL
Qty: 22 TABLET | Refills: 0 | Status: SHIPPED | OUTPATIENT
Start: 2025-01-28

## 2025-01-28 RX ORDER — DIPHENOXYLATE HYDROCHLORIDE AND ATROPINE SULFATE 2.5; .025 MG/1; MG/1
1 TABLET ORAL 4 TIMES DAILY PRN
Qty: 20 TABLET | Refills: 0 | Status: SHIPPED | OUTPATIENT
Start: 2025-01-28

## 2025-01-28 NOTE — PROGRESS NOTES
"Chief Complaint  Headache, Generalized Body Aches, Fever (Chills), and Diarrhea (All of these symptoms started last night. He has taken some Tylenol. )    Subjective      Dung Burch presents to [facility]    Headache  Fever   Associated symptoms include diarrhea and headaches. Pertinent negatives include no abdominal pain, chest pain, congestion, coughing, ear pain, urinary pain or wheezing.   Diarrhea   Associated symptoms include a fever and headaches. Pertinent negatives include no abdominal pain, arthralgias or coughing.     History of present illness:  Patient is 69-year-old male, patient of Dr. Sanches, with underlying hypertension, hyperlipidemia, diabetes versus IFG, morbid obesity, among others, who is coming in 1/25 for urgent issues as per chief complaint above.  We will go over his med list, review appropriate labs, and make further recommendations after that.    Review of Systems   Constitutional:  Positive for fever. Negative for appetite change and fatigue.   HENT:  Negative for congestion and ear pain.    Eyes:  Negative for blurred vision.   Respiratory:  Negative for cough, chest tightness, shortness of breath and wheezing.    Cardiovascular:  Negative for chest pain, palpitations and leg swelling.   Gastrointestinal:  Positive for diarrhea. Negative for abdominal pain.   Genitourinary:  Negative for difficulty urinating, dysuria and hematuria.   Musculoskeletal:  Negative for arthralgias and gait problem.   Skin:  Negative for skin lesions.   Neurological:  Negative for syncope, memory problem and confusion.   Psychiatric/Behavioral:  Negative for self-injury and depressed mood.        Objective   Vital Signs:   /88   Pulse 95   Temp 99.1 °F (37.3 °C) (Skin)   Ht 167.6 cm (65.98\")   Wt 107 kg (235 lb 12.8 oz)   SpO2 97%   BMI 38.08 kg/m²           Physical Exam  Vitals and nursing note reviewed.   Constitutional:       General: He is not in " acute distress.     Appearance: Normal appearance. He is not toxic-appearing.   HENT:      Head: Atraumatic.      Right Ear: External ear normal.      Left Ear: External ear normal.      Nose: Nose normal.      Mouth/Throat:      Mouth: Mucous membranes are moist.   Eyes:      General:         Right eye: No discharge.         Left eye: No discharge.      Extraocular Movements: Extraocular movements intact.      Pupils: Pupils are equal, round, and reactive to light.   Cardiovascular:      Rate and Rhythm: Normal rate and regular rhythm.      Pulses: Normal pulses.      Heart sounds: Normal heart sounds. No murmur heard.     No gallop.   Pulmonary:      Effort: Pulmonary effort is normal. No respiratory distress.      Breath sounds: No wheezing, rhonchi or rales.   Abdominal:      General: There is no distension.      Palpations: Abdomen is soft. There is no mass.      Tenderness: There is no abdominal tenderness. There is no guarding.   Musculoskeletal:         General: No swelling or tenderness.      Cervical back: No tenderness.      Right lower leg: No edema.      Left lower leg: No edema.   Skin:     General: Skin is warm and dry.      Findings: No rash.   Neurological:      General: No focal deficit present.      Mental Status: He is alert and oriented to person, place, and time. Mental status is at baseline.      Motor: No weakness.      Gait: Gait normal.   Psychiatric:         Mood and Affect: Mood normal.         Thought Content: Thought content normal.        Result Review   The following data was reviewed by: Steven Long MD on 2025:  [] Laboratory  [] Microbiology  [] Radiology  [] EKG/telemetry  [] Cardiology/Vascular  [] Pathology  [] Old records             Assessment and Plan   Diagnoses and all orders for this visit:    1. Febrile illness, acute (Primary)  Assessment & Plan:  Patient was at a large gathering for a , no known contacts though.    Had acute viral syndrome starting last  night with myalgias headache fever chills etc. Currently no productive cough, regards to fever also not having any dysuria.    He just tested negative for COVID and flu.    His lungs are clear bilaterally, he is 97% on room air.    He is having some issues with being short of breath periodically, not been able to get a deep breath, so we will get a chest x-ray to that end.    Otherwise we will treat him symptomatically per orders.      2. Body aches  -     POCT SARS-CoV-2 Antigen ANNAMARIA + Flu    3. Fever, unspecified fever cause  Assessment & Plan:  Patient was at a large gathering for a , no known contacts though.    Had acute viral syndrome starting last night with myalgias headache fever chills etc. Currently no productive cough, regards to fever also not having any dysuria.    He just tested negative for COVID and flu.    His lungs are clear bilaterally, he is 97% on room air.    He is having some issues with being short of breath periodically, not been able to get a deep breath, so we will get a chest x-ray to that end.    Otherwise we will treat him symptomatically per orders.    Orders:  -     POCT SARS-CoV-2 Antigen ANNAMARIA + Flu    4. Primary hypertension  Assessment & Plan:  Blood pressure is elevated as of his  urgent visit.  As noted he is acutely ill.  However looking back his pressures have been suboptimal at best to markedly elevated at worst during other office visits etc.    Blood pressure was little better on my repeat.    He is maxed out on lisinopril, he is also on low-dose spironolactone.  He was given prescription for low-dose 2.5 amlodipine, not currently utilizing this.    Will make no changes during his acute illness, but certainly needs follow-up with Dr. Matta in the near future for further blood pressure control.              5. Shortness of breath  -     XR Chest PA & Lateral; Future    6. Diarrhea, unspecified type  -     diphenoxylate-atropine (Lomotil) 2.5-0.025 MG per tablet;  Take 1 tablet by mouth 4 (Four) Times a Day As Needed for Diarrhea.  Dispense: 20 tablet; Refill: 0    Other orders  -     predniSONE (DELTASONE) 20 MG tablet; 2 pills twice a day for 3 days, 1 pill twice a day for 3 days, 1 pill daily for 4 days then stop.  Dispense: 22 tablet; Refill: 0                      Follow Up   Return for Next scheduled follow up.  Patient was given instructions and counseling regarding his condition or for health maintenance advice. Please see specific information pulled into the AVS if appropriate.     Total Time Spent:   minutes     This time includes time spent by me in the following activities: preparing for the visit, reviewing extensive past medical history and tests, performing a medically appropriate examination and/or evaluation, counseling and educating the patient and/or caregivers, ordering medications, tests, or procedures, referring and/or communicating with other health care professionals and documenting information in the medical record all on this date of service.

## 2025-01-28 NOTE — ASSESSMENT & PLAN NOTE
Patient was at a large gathering for a , no known contacts though.    Had acute viral syndrome starting last night with myalgias headache fever chills etc. Currently no productive cough, regards to fever also not having any dysuria.    He just tested negative for COVID and flu.    His lungs are clear bilaterally, he is 97% on room air.    He is having some issues with being short of breath periodically, not been able to get a deep breath, so we will get a chest x-ray to that end.    Otherwise we will treat him symptomatically per orders.

## 2025-01-28 NOTE — ASSESSMENT & PLAN NOTE
Blood pressure is elevated as of his 1/25 urgent visit.  As noted he is acutely ill.  However looking back his pressures have been suboptimal at best to markedly elevated at worst during other office visits etc.    Blood pressure was little better on my repeat.    He is maxed out on lisinopril, he is also on low-dose spironolactone.  He was given prescription for low-dose 2.5 amlodipine, not currently utilizing this.    Will make no changes during his acute illness, but certainly needs follow-up with Dr. Matta in the near future for further blood pressure control.

## 2025-02-14 ENCOUNTER — OFFICE VISIT (OUTPATIENT)
Dept: INTERNAL MEDICINE | Age: 70
End: 2025-02-14
Payer: MEDICARE

## 2025-02-14 VITALS
WEIGHT: 232 LBS | HEIGHT: 66 IN | DIASTOLIC BLOOD PRESSURE: 81 MMHG | BODY MASS INDEX: 37.28 KG/M2 | TEMPERATURE: 98.2 F | HEART RATE: 80 BPM | OXYGEN SATURATION: 93 % | SYSTOLIC BLOOD PRESSURE: 143 MMHG

## 2025-02-14 DIAGNOSIS — G45.9 TRANSIENT ISCHEMIC ATTACK: ICD-10-CM

## 2025-02-14 DIAGNOSIS — E55.9 VITAMIN D DEFICIENCY: ICD-10-CM

## 2025-02-14 DIAGNOSIS — D70.9 NEUTROPENIA, UNSPECIFIED TYPE: ICD-10-CM

## 2025-02-14 DIAGNOSIS — C91.40 HAIRY CELL LEUKEMIA NOT HAVING ACHIEVED REMISSION: ICD-10-CM

## 2025-02-14 DIAGNOSIS — Z00.00 MEDICARE ANNUAL WELLNESS VISIT, SUBSEQUENT: Primary | ICD-10-CM

## 2025-02-14 DIAGNOSIS — R73.01 IFG (IMPAIRED FASTING GLUCOSE): ICD-10-CM

## 2025-02-14 DIAGNOSIS — I10 PRIMARY HYPERTENSION: ICD-10-CM

## 2025-02-14 DIAGNOSIS — E29.1 HYPOGONADISM IN MALE: ICD-10-CM

## 2025-02-14 DIAGNOSIS — F33.1 MAJOR DEPRESSIVE DISORDER, RECURRENT, MODERATE: ICD-10-CM

## 2025-02-14 DIAGNOSIS — G47.33 OSA ON CPAP: ICD-10-CM

## 2025-02-14 DIAGNOSIS — E66.812 CLASS 2 OBESITY DUE TO EXCESS CALORIES WITHOUT SERIOUS COMORBIDITY WITH BODY MASS INDEX (BMI) OF 39.0 TO 39.9 IN ADULT: ICD-10-CM

## 2025-02-14 DIAGNOSIS — E66.09 CLASS 2 OBESITY DUE TO EXCESS CALORIES WITHOUT SERIOUS COMORBIDITY WITH BODY MASS INDEX (BMI) OF 39.0 TO 39.9 IN ADULT: ICD-10-CM

## 2025-02-14 DIAGNOSIS — E78.2 MIXED HYPERLIPIDEMIA: ICD-10-CM

## 2025-02-14 DIAGNOSIS — Z12.5 SCREENING PSA (PROSTATE SPECIFIC ANTIGEN): ICD-10-CM

## 2025-02-14 PROCEDURE — 3079F DIAST BP 80-89 MM HG: CPT | Performed by: INTERNAL MEDICINE

## 2025-02-14 PROCEDURE — 1126F AMNT PAIN NOTED NONE PRSNT: CPT | Performed by: INTERNAL MEDICINE

## 2025-02-14 PROCEDURE — 1170F FXNL STATUS ASSESSED: CPT | Performed by: INTERNAL MEDICINE

## 2025-02-14 PROCEDURE — 99214 OFFICE O/P EST MOD 30 MIN: CPT | Performed by: INTERNAL MEDICINE

## 2025-02-14 PROCEDURE — G0439 PPPS, SUBSEQ VISIT: HCPCS | Performed by: INTERNAL MEDICINE

## 2025-02-14 PROCEDURE — 3077F SYST BP >= 140 MM HG: CPT | Performed by: INTERNAL MEDICINE

## 2025-02-14 RX ORDER — TESTOSTERONE CYPIONATE 200 MG/ML
INJECTION, SOLUTION INTRAMUSCULAR
Qty: 2 ML | Refills: 5 | Status: SHIPPED | OUTPATIENT
Start: 2025-02-14

## 2025-02-14 NOTE — PROGRESS NOTES
Subjective   The ABCs of the Annual Wellness Visit  Medicare Wellness Visit      Dung Burch is a 69 y.o. patient who presents for a Medicare Wellness Visit.    The following portions of the patient's history were reviewed and   updated as appropriate: allergies, current medications, past family history, past medical history, past social history, past surgical history, and problem list.    Compared to one year ago, the patient's physical   health is worse.  Compared to one year ago, the patient's mental   health is the same.    Recent Hospitalizations:  He was not admitted to the hospital during the last year.     Current Medical Providers:  Patient Care Team:  Isaac Matta MD as PCP - General (Internal Medicine)  Dione Nayak MD PhD as Consulting Physician (Hematology and Oncology)    Outpatient Medications Prior to Visit   Medication Sig Dispense Refill    amLODIPine (NORVASC) 2.5 MG tablet Take 1 tablet by mouth Daily. 30 tablet 1    aspirin 81 MG chewable tablet aspirin 81 mg oral tablet,chewable chew 1 tablet (81 mg) by oral route once daily   Active      buPROPion XL (Wellbutrin XL) 300 MG 24 hr tablet Take 1 tablet by mouth Daily. 90 tablet 3    cholecalciferol (VITAMIN D3) 25 MCG (1000 UT) tablet       citalopram (CeleXA) 20 MG tablet Take 1 tablet by mouth Daily. 90 tablet 1    diphenoxylate-atropine (Lomotil) 2.5-0.025 MG per tablet Take 1 tablet by mouth 4 (Four) Times a Day As Needed for Diarrhea. 20 tablet 0    fluticasone (FLONASE) 50 MCG/ACT nasal spray One -two sprays to each nostril daily 16 g 1    Lido-Menthol-Methyl Sal-Camph (CBD KINGS EX) Apply 1 tablet topically.      lisinopril (PRINIVIL,ZESTRIL) 40 MG tablet Take 1 tablet by mouth Daily. 90 tablet 1    loratadine (CLARITIN) 10 MG tablet loratadine 10 mg oral tablet take 1 tablet (10 mg) by oral route once daily   Active      metFORMIN (GLUCOPHAGE) 500 MG tablet TAKE 1 TABLET BY MOUTH TWICE DAILY WITH MEALS 120 tablet 5     montelukast (Singulair) 10 MG tablet Take 1 tablet by mouth Every Night. 30 tablet 1    predniSONE (DELTASONE) 20 MG tablet 2 pills twice a day for 3 days, 1 pill twice a day for 3 days, 1 pill daily for 4 days then stop. 22 tablet 0    rosuvastatin (CRESTOR) 10 MG tablet TAKE 1 TABLET BY MOUTH EVERY DAY 90 tablet 3    spironolactone (ALDACTONE) 25 MG tablet TAKE 1 TABLET BY MOUTH EVERY DAY 90 tablet 3    Testosterone Cypionate (DEPOTESTOTERONE CYPIONATE) 200 MG/ML injection INJECT 1 ML SUBCUTANEOUSLY AS DIRECTED EVERY 2 WEEKS 2 mL 5     No facility-administered medications prior to visit.     No opioid medication identified on active medication list. I have reviewed chart for other potential  high risk medication/s and harmful drug interactions in the elderly.      Aspirin is on active medication list. Aspirin use is indicated based on review of current medical condition/s. Pros and cons of this therapy have been discussed today. Benefits of this medication outweigh potential harm.  Patient has been encouraged to continue taking this medication.  .      Patient Active Problem List   Diagnosis    Encounter for subsequent annual wellness visit (AWV) in Medicare patient    Class 2 obesity due to excess calories without serious comorbidity with body mass index (BMI) of 39.0 to 39.9 in adult    IFG (impaired fasting glucose)    Primary hypertension    Mixed hyperlipidemia    Cerebrovascular accident (CVA) due to thrombosis of left middle cerebral artery    SHANI on CPAP    Umbilical hernia without obstruction and without gangrene    Leukopenia    Cervical root syndrome    Contusion    Hypertonicity of bladder    Kidney stones    Arthritis    Pure hypercholesterolemia    Transient ischemic attack    Urgency incontinence    Urgency of urination    Vitamin D deficiency    Hairy cell leukemia not having achieved remission    Bleeding nose    Other fatigue    Bilateral headaches    Febrile illness, acute    Acute  "prostatitis    Hypogonadism in male    Elevated PSA    RUQ abdominal pain    Major depressive disorder, recurrent, moderate    Screening PSA (prostate specific antigen)    Tick bite of left hip    Memory changes    Class 2 severe obesity due to excess calories with serious comorbidity and body mass index (BMI) of 36.0 to 36.9 in adult    Impaired fasting glucose    Acute URI    Glossitis, benign migratory    Acute non-recurrent maxillary sinusitis     Advance Care Planning Advance Directive is not on file.  ACP discussion was held with the patient during this visit. Patient has an advance directive (not in EMR), copy requested.            Objective   Vitals:    25 0803   BP: 143/81   BP Location: Left arm   Patient Position: Sitting   Pulse: 80   Temp: 98.2 °F (36.8 °C)   SpO2: 93%   Weight: 105 kg (232 lb)   Height: 167.6 cm (65.98\")   PainSc: 0-No pain       Estimated body mass index is 37.46 kg/m² as calculated from the following:    Height as of this encounter: 167.6 cm (65.98\").    Weight as of this encounter: 105 kg (232 lb).                Does the patient have evidence of cognitive impairment? No                                                                                               Health  Risk Assessment    Smoking Status:  Social History     Tobacco Use   Smoking Status Never    Passive exposure: Never   Smokeless Tobacco Never     Alcohol Consumption:  Social History     Substance and Sexual Activity   Alcohol Use Yes    Alcohol/week: 3.0 standard drinks of alcohol    Types: 3 Cans of beer per week    Comment: 3 per day       Fall Risk Screen  STEADI Fall Risk Assessment was completed, and patient is at LOW risk for falls.Assessment completed on:2025    Depression Screening   Little interest or pleasure in doing things? Not at all   Feeling down, depressed, or hopeless? Not at all   PHQ-2 Total Score 0      Health Habits and Functional and Cognitive Screenin/14/2025     8:00 AM "   Functional & Cognitive Status   Do you have difficulty preparing food and eating? No   Do you have difficulty bathing yourself, getting dressed or grooming yourself? No   Do you have difficulty using the toilet? No   Do you have difficulty moving around from place to place? No   Do you have trouble with steps or getting out of a bed or a chair? No   Current Diet Well Balanced Diet   Dental Exam Up to date   Eye Exam Up to date   Exercise (times per week) 7 times per week   Current Exercises Include Other;Yard Work   Do you need help using the phone?  No   Are you deaf or do you have serious difficulty hearing?  Yes   Do you need help to go to places out of walking distance? No   Do you need help shopping? No   Do you need help preparing meals?  No   Do you need help with housework?  No   Do you need help with laundry? No   Do you need help taking your medications? No   Do you need help managing money? No   Do you ever drive or ride in a car without wearing a seat belt? No   Have you felt unusual stress, anger or loneliness in the last month? No   Who do you live with? Spouse   If you need help, do you have trouble finding someone available to you? No   Have you been bothered in the last four weeks by sexual problems? No   Do you have difficulty concentrating, remembering or making decisions? No           Age-appropriate Screening Schedule:  Refer to the list below for future screening recommendations based on patient's age, sex and/or medical conditions. Orders for these recommended tests are listed in the plan section. The patient has been provided with a written plan.    Health Maintenance List  Health Maintenance   Topic Date Due    TDAP/TD VACCINES (1 - Tdap) Never done    ZOSTER VACCINE (1 of 2) Never done    Pneumococcal Vaccine 50+ (2 of 2 - PCV) 11/27/2019    COVID-19 Vaccine (3 - Moderna risk series) 05/07/2021    LIPID PANEL  03/27/2024    ANNUAL WELLNESS VISIT  03/28/2024    INFLUENZA VACCINE   07/01/2024    BMI FOLLOWUP  05/02/2025    COLORECTAL CANCER SCREENING  12/08/2031    HEPATITIS C SCREENING  Completed                                                                                                                                                CMS Preventative Services Quick Reference  Risk Factors Identified During Encounter  Alcohol Misuse: Patient encouraged to limit alcohol use to no more than 1 standard alcoholic beverage per day. (12 ounce beer, 6 ounce wine, one shot liquor)    The above risks/problems have been discussed with the patient.  Pertinent information has been shared with the patient in the After Visit Summary.  An After Visit Summary and PPPS were made available to the patient.    Follow Up:   Next Medicare Wellness visit to be scheduled in 1 year.     Assessment & Plan         Follow Up:   No follow-ups on file.

## 2025-02-14 NOTE — PROGRESS NOTES
"Chief Complaint   Patient presents with    Medicare Wellness-subsequent     Wellness, lab follow up. Pt has concerns regarding labs and SOA , feels like he is holding your breath. Pt notice symptoms a few months ago.   Chest x-ray no active disease January 20, 2025    Objective   Vital Signs  Vitals:    02/14/25 0803   BP: 143/81   BP Location: Left arm   Patient Position: Sitting   Pulse: 80   Temp: 98.2 °F (36.8 °C)   SpO2: 93%   Weight: 105 kg (232 lb)   Height: 167.6 cm (65.98\")      Body mass index is 37.46 kg/m².  Review of Systems   Constitutional: Negative.    HENT: Negative.     Eyes: Negative.    Respiratory: Negative.     Cardiovascular: Negative.    Gastrointestinal: Negative.    Endocrine: Negative.    Genitourinary: Negative.    Musculoskeletal: Negative.    Allergic/Immunologic: Negative.    Neurological: Negative.    Hematological: Negative.    Psychiatric/Behavioral: Negative.        Physical Exam  Constitutional:       General: He is not in acute distress.     Appearance: Normal appearance.   HENT:      Head: Normocephalic.      Mouth/Throat:      Mouth: Mucous membranes are moist.   Eyes:      Conjunctiva/sclera: Conjunctivae normal.      Pupils: Pupils are equal, round, and reactive to light.   Cardiovascular:      Rate and Rhythm: Normal rate and regular rhythm.      Pulses: Normal pulses.      Heart sounds: Normal heart sounds.   Pulmonary:      Effort: Pulmonary effort is normal.      Breath sounds: Normal breath sounds.   Abdominal:      General: Abdomen is flat. Bowel sounds are normal.      Palpations: Abdomen is soft.   Musculoskeletal:         General: No swelling. Normal range of motion.      Cervical back: Neck supple.   Skin:     General: Skin is warm and dry.      Coloration: Skin is not jaundiced.   Neurological:      General: No focal deficit present.      Mental Status: He is alert and oriented to person, place, and time. Mental status is at baseline.   Psychiatric:         Mood " "and Affect: Mood normal.         Behavior: Behavior normal.         Thought Content: Thought content normal.         Judgment: Judgment normal.        Result Review :   No results found for: \"PROBNP\", \"BNP\"  CMP          11/8/2024    14:04   CMP   Glucose 102    BUN 18    Creatinine 1.12    EGFR 71.1    Sodium 137    Potassium 4.4    Chloride 102    Calcium 8.7    Total Protein 6.7    Albumin 4.2    Globulin 2.5    Total Bilirubin 0.3    Alkaline Phosphatase 56    AST (SGOT) 22    ALT (SGPT) 30    Albumin/Globulin Ratio 1.7    BUN/Creatinine Ratio 16.1    Anion Gap 9.9      CBC w/diff          11/8/2024    14:04   CBC w/Diff   WBC 6.75    RBC 4.74    Hemoglobin 15.7    Hematocrit 44.8    MCV 94.5    MCH 33.1    MCHC 35.0    RDW 13.6    Platelets 202    Neutrophil Rel % 69.5    Immature Granulocyte Rel % 1.3    Lymphocyte Rel % 14.2    Monocyte Rel % 11.6    Eosinophil Rel % 2.8    Basophil Rel % 0.6          Lab Results   Component Value Date    TSH 0.920 06/28/2023    TSH 0.671 03/27/2023    TSH 0.847 01/05/2023      Lab Results   Component Value Date    FREET4 1.26 06/28/2023    FREET4 1.17 03/27/2023    FREET4 1.15 01/05/2023                          Visit Diagnoses:    ICD-10-CM ICD-9-CM   1. Medicare annual wellness visit, subsequent  Z00.00 V70.0   2. IFG (impaired fasting glucose)  R73.01 790.21   3. Mixed hyperlipidemia  E78.2 272.2   4. Primary hypertension  I10 401.9   5. Vitamin D deficiency  E55.9 268.9   6. Class 2 obesity due to excess calories without serious comorbidity with body mass index (BMI) of 39.0 to 39.9 in adult  E66.812 278.00    E66.09 V85.39    Z68.39    7. Hypogonadism in male  E29.1 257.2   8. Neutropenia, unspecified type  D70.9 288.00   9. Hairy cell leukemia not having achieved remission  C91.40 202.40   10. Screening PSA (prostate specific antigen)  Z12.5 V76.44   11. Transient ischemic attack  G45.9 435.9   12. SHANI on CPAP  G47.33 327.23   13. Major depressive disorder, recurrent, " moderate  F33.1 296.32       Assessment and Plan   Diagnoses and all orders for this visit:    1. Medicare annual wellness visit, subsequent (Primary)  -     Hemoglobin A1c; Future  -     Comprehensive Metabolic Panel; Future  -     Lipid Panel; Future  -     Vitamin D,25-Hydroxy; Future  -     Vitamin B12 anemia; Future  -     Folate anemia; Future  -     TSH+Free T4; Future  -     PSA Screen; Future  -     Hemoglobin A1c; Future  -     Comprehensive Metabolic Panel; Future  -     CBC & Differential; Future  -     Lipid Panel; Future  -     Testosterone; Future    2. IFG (impaired fasting glucose)  -     Hemoglobin A1c; Future  -     Comprehensive Metabolic Panel; Future  -     Lipid Panel; Future  -     Vitamin D,25-Hydroxy; Future  -     Vitamin B12 anemia; Future  -     Folate anemia; Future  -     TSH+Free T4; Future  -     PSA Screen; Future  -     Hemoglobin A1c; Future  -     Comprehensive Metabolic Panel; Future  -     CBC & Differential; Future  -     Lipid Panel; Future  -     Testosterone; Future    3. Mixed hyperlipidemia  -     Hemoglobin A1c; Future  -     Comprehensive Metabolic Panel; Future  -     Lipid Panel; Future  -     Vitamin D,25-Hydroxy; Future  -     Vitamin B12 anemia; Future  -     Folate anemia; Future  -     TSH+Free T4; Future  -     PSA Screen; Future  -     Hemoglobin A1c; Future  -     Comprehensive Metabolic Panel; Future  -     CBC & Differential; Future  -     Lipid Panel; Future  -     Testosterone; Future    4. Primary hypertension  -     Hemoglobin A1c; Future  -     Comprehensive Metabolic Panel; Future  -     Lipid Panel; Future  -     Vitamin D,25-Hydroxy; Future  -     Vitamin B12 anemia; Future  -     Folate anemia; Future  -     TSH+Free T4; Future  -     PSA Screen; Future  -     Hemoglobin A1c; Future  -     Comprehensive Metabolic Panel; Future  -     CBC & Differential; Future  -     Lipid Panel; Future  -     Testosterone; Future    5. Vitamin D deficiency  -      Hemoglobin A1c; Future  -     Comprehensive Metabolic Panel; Future  -     Lipid Panel; Future  -     Vitamin D,25-Hydroxy; Future  -     Vitamin B12 anemia; Future  -     Folate anemia; Future  -     TSH+Free T4; Future  -     PSA Screen; Future  -     Hemoglobin A1c; Future  -     Comprehensive Metabolic Panel; Future  -     CBC & Differential; Future  -     Lipid Panel; Future  -     Testosterone; Future    6. Class 2 obesity due to excess calories without serious comorbidity with body mass index (BMI) of 39.0 to 39.9 in adult  -     Hemoglobin A1c; Future  -     Comprehensive Metabolic Panel; Future  -     Lipid Panel; Future  -     Vitamin D,25-Hydroxy; Future  -     Vitamin B12 anemia; Future  -     Folate anemia; Future  -     TSH+Free T4; Future  -     PSA Screen; Future  -     Hemoglobin A1c; Future  -     Comprehensive Metabolic Panel; Future  -     CBC & Differential; Future  -     Lipid Panel; Future  -     Testosterone; Future    7. Hypogonadism in male  -     Hemoglobin A1c; Future  -     Comprehensive Metabolic Panel; Future  -     Lipid Panel; Future  -     Vitamin D,25-Hydroxy; Future  -     Vitamin B12 anemia; Future  -     Folate anemia; Future  -     TSH+Free T4; Future  -     PSA Screen; Future  -     Hemoglobin A1c; Future  -     Comprehensive Metabolic Panel; Future  -     CBC & Differential; Future  -     Lipid Panel; Future  -     Testosterone; Future    8. Neutropenia, unspecified type  -     Hemoglobin A1c; Future  -     Comprehensive Metabolic Panel; Future  -     Lipid Panel; Future  -     Vitamin D,25-Hydroxy; Future  -     Vitamin B12 anemia; Future  -     Folate anemia; Future  -     TSH+Free T4; Future  -     PSA Screen; Future  -     Hemoglobin A1c; Future  -     Comprehensive Metabolic Panel; Future  -     CBC & Differential; Future  -     Lipid Panel; Future  -     Testosterone; Future    9. Hairy cell leukemia not having achieved remission  -     Hemoglobin A1c; Future  -      Comprehensive Metabolic Panel; Future  -     Lipid Panel; Future  -     Vitamin D,25-Hydroxy; Future  -     Vitamin B12 anemia; Future  -     Folate anemia; Future  -     TSH+Free T4; Future  -     PSA Screen; Future  -     Hemoglobin A1c; Future  -     Comprehensive Metabolic Panel; Future  -     CBC & Differential; Future  -     Lipid Panel; Future  -     Testosterone; Future    10. Screening PSA (prostate specific antigen)  -     Hemoglobin A1c; Future  -     Comprehensive Metabolic Panel; Future  -     Lipid Panel; Future  -     Vitamin D,25-Hydroxy; Future  -     Vitamin B12 anemia; Future  -     Folate anemia; Future  -     TSH+Free T4; Future  -     PSA Screen; Future  -     Hemoglobin A1c; Future  -     Comprehensive Metabolic Panel; Future  -     CBC & Differential; Future  -     Lipid Panel; Future  -     Testosterone; Future    11. Transient ischemic attack  -     Hemoglobin A1c; Future  -     Comprehensive Metabolic Panel; Future  -     Lipid Panel; Future  -     Vitamin D,25-Hydroxy; Future  -     Vitamin B12 anemia; Future  -     Folate anemia; Future  -     TSH+Free T4; Future  -     PSA Screen; Future  -     Hemoglobin A1c; Future  -     Comprehensive Metabolic Panel; Future  -     CBC & Differential; Future  -     Lipid Panel; Future  -     Testosterone; Future    12. SHANI on CPAP  -     Hemoglobin A1c; Future  -     Comprehensive Metabolic Panel; Future  -     Lipid Panel; Future  -     Vitamin D,25-Hydroxy; Future  -     Vitamin B12 anemia; Future  -     Folate anemia; Future  -     TSH+Free T4; Future  -     PSA Screen; Future  -     Hemoglobin A1c; Future  -     Comprehensive Metabolic Panel; Future  -     CBC & Differential; Future  -     Lipid Panel; Future  -     Testosterone; Future    13. Major depressive disorder, recurrent, moderate  -     Hemoglobin A1c; Future  -     Comprehensive Metabolic Panel; Future  -     Lipid Panel; Future  -     Vitamin D,25-Hydroxy; Future  -     Vitamin B12  anemia; Future  -     Folate anemia; Future  -     TSH+Free T4; Future  -     PSA Screen; Future  -     Hemoglobin A1c; Future  -     Comprehensive Metabolic Panel; Future  -     CBC & Differential; Future  -     Lipid Panel; Future  -     Testosterone; Future    Other orders  -     Semaglutide-Weight Management 0.25 MG/0.5ML solution auto-injector; Inject 0.5 mL under the skin into the appropriate area as directed 1 (One) Time Per Week.  Dispense: 2 mL; Refill: 5    Medicare wellness completed February 14, 2025    Class 2 Severe Obesity (BMI >=35 and <=39.9). Obesity-related health conditions include the following: hypertension and GERD. Obesity is worsening. BMI is is above average; BMI management plan is completed. We discussed low calorie, low carb based diet program, portion control, and increasing exercise.  Treatment options discussed, consider use of metformin versus Wegovy or Trulicity etc., discussed May 2, 2024 a new prescription for Wegovy was sent into [facility] February 14, 2025    Elevated creatinine possible CKD stage IIIa discussed diagnosis February 2025    Impaired fasting glucose == continues metformin 500 mg twice a day    hypogonadism,--started Depo testosterone injections every 2 weeks 200 mg/mL,     Hairy cell leukemia, status posttreatment Uma 15, 2022--with IV cladribine infusion for 7 days, Uma 15, 2022,-leukopenia, -------continued slow drop over the past 2 to 3 years since 2019, discussed with patient March 30, 2022 -- patient ended up going up to [place]. Addison hairy cell leukemia diagnosis, currently monitoring without aggressive treatment, September 2022,, and December 7, 2022, and March 28, 2023,, labs stable September 2023,    PSA     umbilical hernia --- did see Adonay Sainz September 2021--wants to lose wgt first--- march 2022    Mild reactive depression, --cont Celexa 20 mg  Qd , Wellbutrin  daily    Polycythemia - much improved off  testosterone, September 2022    HTN -  Cont lisinopril 40 mg once a day, doxazosin 4 mg daily at bedtime, spironolactone 25 mg daily---workup to include plasma metanephrine, renin, aldosterone and chemistry panels, August 2018---  All  normal--    LACUNAR INFARCT L BASAL GANGLIA, OLD , ON CT BRAIN JUNE 2018, -- echocardiogram,Normal ejection fraction otherwise unremarkable July 2018 carotid ultrasound, No significant stenosis July 2018,, Continue high-dose moderate dose statin therapy along with aspirin 81 mg daily,     S/P LAP CHOLEY MAY 2019, HARLEY --    Jc, on cpap, 2019     elevated chol --cont -ROUSAVASTATIN 10 MG QD ,      IFG, hemoglobin A1c 5.3--- September 2023    C SCOPE 2013, HARLEY ,Repeat colonoscopy September 2019, , Colonoscopy noted, one small tubular adenoma        Follow Up   Return in about 6 months (around 8/14/2025).  Patient was given instructions and counseling regarding his condition or for health maintenance advice. Please see specific information pulled into the AVS if appropriate.

## 2025-03-04 RX ORDER — ROSUVASTATIN CALCIUM 10 MG/1
10 TABLET, COATED ORAL DAILY
Qty: 90 TABLET | Refills: 3 | Status: SHIPPED | OUTPATIENT
Start: 2025-03-04

## 2025-03-06 ENCOUNTER — LAB (OUTPATIENT)
Dept: LAB | Facility: HOSPITAL | Age: 70
End: 2025-03-06
Payer: MEDICARE

## 2025-03-06 DIAGNOSIS — Z12.5 SCREENING PSA (PROSTATE SPECIFIC ANTIGEN): ICD-10-CM

## 2025-03-06 DIAGNOSIS — E55.9 VITAMIN D DEFICIENCY: ICD-10-CM

## 2025-03-06 DIAGNOSIS — R73.01 IFG (IMPAIRED FASTING GLUCOSE): ICD-10-CM

## 2025-03-06 DIAGNOSIS — G47.33 OSA ON CPAP: ICD-10-CM

## 2025-03-06 DIAGNOSIS — E66.09 CLASS 2 OBESITY DUE TO EXCESS CALORIES WITHOUT SERIOUS COMORBIDITY WITH BODY MASS INDEX (BMI) OF 39.0 TO 39.9 IN ADULT: ICD-10-CM

## 2025-03-06 DIAGNOSIS — E29.1 HYPOGONADISM IN MALE: ICD-10-CM

## 2025-03-06 DIAGNOSIS — G45.9 TRANSIENT ISCHEMIC ATTACK: ICD-10-CM

## 2025-03-06 DIAGNOSIS — Z00.00 MEDICARE ANNUAL WELLNESS VISIT, SUBSEQUENT: ICD-10-CM

## 2025-03-06 DIAGNOSIS — D70.9 NEUTROPENIA, UNSPECIFIED TYPE: ICD-10-CM

## 2025-03-06 DIAGNOSIS — F33.1 MAJOR DEPRESSIVE DISORDER, RECURRENT, MODERATE: ICD-10-CM

## 2025-03-06 DIAGNOSIS — C91.40 HAIRY CELL LEUKEMIA NOT HAVING ACHIEVED REMISSION: ICD-10-CM

## 2025-03-06 DIAGNOSIS — E78.2 MIXED HYPERLIPIDEMIA: ICD-10-CM

## 2025-03-06 DIAGNOSIS — I10 PRIMARY HYPERTENSION: ICD-10-CM

## 2025-03-06 DIAGNOSIS — E66.812 CLASS 2 OBESITY DUE TO EXCESS CALORIES WITHOUT SERIOUS COMORBIDITY WITH BODY MASS INDEX (BMI) OF 39.0 TO 39.9 IN ADULT: ICD-10-CM

## 2025-03-06 LAB
25(OH)D3 SERPL-MCNC: 44.7 NG/ML (ref 30–100)
ALBUMIN SERPL-MCNC: 4.5 G/DL (ref 3.5–5.2)
ALBUMIN/GLOB SERPL: 1.7 G/DL
ALP SERPL-CCNC: 63 U/L (ref 39–117)
ALT SERPL W P-5'-P-CCNC: 25 U/L (ref 1–41)
ANION GAP SERPL CALCULATED.3IONS-SCNC: 11.5 MMOL/L (ref 5–15)
AST SERPL-CCNC: 25 U/L (ref 1–40)
BILIRUB SERPL-MCNC: 0.6 MG/DL (ref 0–1.2)
BUN SERPL-MCNC: 12 MG/DL (ref 8–23)
BUN/CREAT SERPL: 10.3 (ref 7–25)
CALCIUM SPEC-SCNC: 9.7 MG/DL (ref 8.6–10.5)
CHLORIDE SERPL-SCNC: 101 MMOL/L (ref 98–107)
CHOLEST SERPL-MCNC: 130 MG/DL (ref 0–200)
CO2 SERPL-SCNC: 25.5 MMOL/L (ref 22–29)
CREAT SERPL-MCNC: 1.17 MG/DL (ref 0.76–1.27)
EGFRCR SERPLBLD CKD-EPI 2021: 67.5 ML/MIN/1.73
GLOBULIN UR ELPH-MCNC: 2.7 GM/DL
GLUCOSE SERPL-MCNC: 103 MG/DL (ref 65–99)
HBA1C MFR BLD: 5.9 % (ref 4.8–5.6)
HDLC SERPL-MCNC: 50 MG/DL (ref 40–60)
LDLC SERPL CALC-MCNC: 57 MG/DL (ref 0–100)
LDLC/HDLC SERPL: 1.09 {RATIO}
POTASSIUM SERPL-SCNC: 4.5 MMOL/L (ref 3.5–5.2)
PROT SERPL-MCNC: 7.2 G/DL (ref 6–8.5)
PSA SERPL-MCNC: 4.34 NG/ML (ref 0–4)
SODIUM SERPL-SCNC: 138 MMOL/L (ref 136–145)
T4 FREE SERPL-MCNC: 1.24 NG/DL (ref 0.92–1.68)
TESTOST SERPL-MCNC: 264 NG/DL (ref 193–740)
TRIGL SERPL-MCNC: 128 MG/DL (ref 0–150)
TSH SERPL DL<=0.05 MIU/L-ACNC: 1.38 UIU/ML (ref 0.27–4.2)
VIT B12 BLD-MCNC: 1694 PG/ML (ref 211–946)
VLDLC SERPL-MCNC: 23 MG/DL (ref 5–40)

## 2025-03-06 PROCEDURE — 36415 COLL VENOUS BLD VENIPUNCTURE: CPT

## 2025-03-06 PROCEDURE — 80053 COMPREHEN METABOLIC PANEL: CPT

## 2025-03-06 PROCEDURE — 83036 HEMOGLOBIN GLYCOSYLATED A1C: CPT

## 2025-03-06 PROCEDURE — 84439 ASSAY OF FREE THYROXINE: CPT

## 2025-03-06 PROCEDURE — 82607 VITAMIN B-12: CPT

## 2025-03-06 PROCEDURE — 84443 ASSAY THYROID STIM HORMONE: CPT

## 2025-03-06 PROCEDURE — 84403 ASSAY OF TOTAL TESTOSTERONE: CPT

## 2025-03-06 PROCEDURE — G0103 PSA SCREENING: HCPCS

## 2025-03-06 PROCEDURE — 82306 VITAMIN D 25 HYDROXY: CPT

## 2025-03-06 PROCEDURE — 80061 LIPID PANEL: CPT

## 2025-03-13 ENCOUNTER — OFFICE VISIT (OUTPATIENT)
Dept: INTERNAL MEDICINE | Age: 70
End: 2025-03-13
Payer: MEDICARE

## 2025-03-13 VITALS
OXYGEN SATURATION: 92 % | BODY MASS INDEX: 36.96 KG/M2 | HEART RATE: 80 BPM | TEMPERATURE: 98.2 F | HEIGHT: 66 IN | WEIGHT: 230 LBS | SYSTOLIC BLOOD PRESSURE: 145 MMHG | DIASTOLIC BLOOD PRESSURE: 85 MMHG

## 2025-03-13 DIAGNOSIS — R73.01 IFG (IMPAIRED FASTING GLUCOSE): ICD-10-CM

## 2025-03-13 DIAGNOSIS — R39.12 BENIGN PROSTATIC HYPERPLASIA WITH WEAK URINARY STREAM: ICD-10-CM

## 2025-03-13 DIAGNOSIS — R97.20 ELEVATED PSA: Primary | ICD-10-CM

## 2025-03-13 DIAGNOSIS — N40.1 BENIGN PROSTATIC HYPERPLASIA WITH WEAK URINARY STREAM: ICD-10-CM

## 2025-03-13 PROCEDURE — 99214 OFFICE O/P EST MOD 30 MIN: CPT | Performed by: INTERNAL MEDICINE

## 2025-03-13 PROCEDURE — 3077F SYST BP >= 140 MM HG: CPT | Performed by: INTERNAL MEDICINE

## 2025-03-13 PROCEDURE — 3079F DIAST BP 80-89 MM HG: CPT | Performed by: INTERNAL MEDICINE

## 2025-03-13 PROCEDURE — 1126F AMNT PAIN NOTED NONE PRSNT: CPT | Performed by: INTERNAL MEDICINE

## 2025-03-13 RX ORDER — TADALAFIL 5 MG/1
5 TABLET ORAL DAILY
Qty: 30 TABLET | Refills: 5 | Status: SHIPPED | OUTPATIENT
Start: 2025-03-13 | End: 2025-03-13

## 2025-03-13 RX ORDER — LEVOFLOXACIN 500 MG/1
500 TABLET, FILM COATED ORAL DAILY
Qty: 12 TABLET | Refills: 0 | Status: SHIPPED | OUTPATIENT
Start: 2025-03-13

## 2025-03-13 RX ORDER — TADALAFIL 5 MG/1
5 TABLET ORAL DAILY
Qty: 30 TABLET | Refills: 5 | Status: SHIPPED | OUTPATIENT
Start: 2025-03-13

## 2025-03-13 RX ORDER — LEVOFLOXACIN 500 MG/1
500 TABLET, FILM COATED ORAL DAILY
Qty: 12 TABLET | Refills: 0 | Status: SHIPPED | OUTPATIENT
Start: 2025-03-13 | End: 2025-03-13

## 2025-03-13 NOTE — PROGRESS NOTES
"Chief Complaint   Patient presents with    Hypertension     Lab follow up. Pt has some concerns regarding labs.    Urinating at times, decreased stream, fatigue, concerned about labs, blood sugars, weight loss issues,    Objective   Vital Signs  Vitals:    03/13/25 1140   BP: 145/85   BP Location: Right arm   Patient Position: Sitting   Pulse: 80   Temp: 98.2 °F (36.8 °C)   SpO2: 92%   Weight: 104 kg (230 lb)   Height: 167.6 cm (65.98\")      Body mass index is 37.14 kg/m².  Review of Systems decreased urinary stream,  Physical Exam  Constitutional:       Appearance: Normal appearance.   Skin:     General: Skin is warm and dry.   Neurological:      General: No focal deficit present.      Mental Status: He is alert and oriented to person, place, and time.   Psychiatric:         Mood and Affect: Mood normal.         Behavior: Behavior normal.        Result Review :   No results found for: \"PROBNP\", \"BNP\"  CMP          11/8/2024    14:04 3/6/2025    10:48   CMP   Glucose 102  103    BUN 18  12    Creatinine 1.12  1.17    EGFR 71.1  67.5    Sodium 137  138    Potassium 4.4  4.5    Chloride 102  101    Calcium 8.7  9.7    Total Protein 6.7  7.2    Albumin 4.2  4.5    Globulin 2.5  2.7    Total Bilirubin 0.3  0.6    Alkaline Phosphatase 56  63    AST (SGOT) 22  25    ALT (SGPT) 30  25    Albumin/Globulin Ratio 1.7  1.7    BUN/Creatinine Ratio 16.1  10.3    Anion Gap 9.9  11.5      CBC w/diff          11/8/2024    14:04   CBC w/Diff   WBC 6.75    RBC 4.74    Hemoglobin 15.7    Hematocrit 44.8    MCV 94.5    MCH 33.1    MCHC 35.0    RDW 13.6    Platelets 202    Neutrophil Rel % 69.5    Immature Granulocyte Rel % 1.3    Lymphocyte Rel % 14.2    Monocyte Rel % 11.6    Eosinophil Rel % 2.8    Basophil Rel % 0.6       Lipid Panel          3/6/2025    10:48   Lipid Panel   Total Cholesterol 130    Triglycerides 128    HDL Cholesterol 50    VLDL Cholesterol 23    LDL Cholesterol  57    LDL/HDL Ratio 1.09       Lab Results " Well controlled, continue current medications, she has been on metoprolol 100 mg daily rather than as written 200 mg daily, today pulses low 60s, will remain on 100 mg daily in addition to other medications.     Component Value Date    TSH 1.380 03/06/2025    TSH 0.920 06/28/2023    TSH 0.671 03/27/2023      Lab Results   Component Value Date    FREET4 1.24 03/06/2025    FREET4 1.26 06/28/2023    FREET4 1.17 03/27/2023      A1C Last 3 Results          3/6/2025    10:48   HGBA1C Last 3 Results   Hemoglobin A1C 5.90       PSA          3/6/2025    10:48   PSA   PSA 4.340                     Visit Diagnoses:    ICD-10-CM ICD-9-CM   1. Elevated PSA  R97.20 790.93   2. IFG (impaired fasting glucose)  R73.01 790.21   3. Benign prostatic hyperplasia with weak urinary stream  N40.1 600.01    R39.12 788.62       Assessment and Plan   Diagnoses and all orders for this visit:    1. Elevated PSA (Primary)  -     Discontinue: levoFLOXacin (LEVAQUIN) 500 MG tablet; Take 1 tablet by mouth Daily.  Dispense: 12 tablet; Refill: 0  -     PSA DIAGNOSTIC; Future  -     levoFLOXacin (LEVAQUIN) 500 MG tablet; Take 1 tablet by mouth Daily.  Dispense: 12 tablet; Refill: 0    2. IFG (impaired fasting glucose)  -     Discontinue: levoFLOXacin (LEVAQUIN) 500 MG tablet; Take 1 tablet by mouth Daily.  Dispense: 12 tablet; Refill: 0  -     PSA DIAGNOSTIC; Future  -     levoFLOXacin (LEVAQUIN) 500 MG tablet; Take 1 tablet by mouth Daily.  Dispense: 12 tablet; Refill: 0    3. Benign prostatic hyperplasia with weak urinary stream    Other orders  -     Tirzepatide-Weight Management (ZEPBOUND) 2.5 MG/0.5ML solution auto-injector; Inject 0.5 mL under the skin into the appropriate area as directed 1 (One) Time Per Week.  Dispense: 2 mL; Refill: 5  -     Discontinue: tadalafil (Cialis) 5 MG tablet; Take 1 tablet by mouth Daily.  Dispense: 30 tablet; Refill: 5  -     tadalafil (Cialis) 5 MG tablet; Take 1 tablet by mouth Daily.  Dispense: 30 tablet; Refill: 5        Medicare wellness completed February 14, 2025    Shortness of breath, chest x-ray no active disease January 28, 2025    Urgency frequency decreased stream, some, urge incontinence at times discussed  treatment with Detrol, side effect profile March 13, 2025 versus Flomax, which causes some sexual dysfunctions which he does not want, versus Cialis daily 5 mg sent in to use daily, #35 refills to try,    Class 2 Severe Obesity (BMI >=35 and <=39.9). Obesity-related health conditions include the following: hypertension and GERD. Obesity is worsening. BMI is is above average; BMI management plan is completed. We discussed low calorie, low carb based diet program, portion control, and increasing exercise.  Treatment options discussed, consider use of metformin versus Wegovy or Trulicity etc., discussed May 2, 2024 a new prescription for zepbound/ was sent into [facility].     Elevated creatinine possible CKD stage IIIa discussed diagnosis February 2025    Impaired fasting glucose 5.9 hemoglobin A1c March 2025 will consider reconsider GLP-1's Wegovy, == continues metformin 500 mg twice a day    hypogonadism,--started Depo testosterone injections every 2 weeks 200 mg/mL,     Hairy cell leukemia, status posttreatment Uma 15, 2022--with IV cladribine infusion for 7 days, Uma 15, 2022,-leukopenia, -------continued slow drop over the past 2 to 3 years since 2019, discussed with patient March 30, 2022 -- patient ended up going up to [place] Dr. Addison hairy cell leukemia diagnosis, currently monitoring without aggressive treatment, September 2022,, and December 7, 2022, and March 28, 2023,, labs stable September 2023,    PSA --elevated PSA compared to the last 3 to 5 years, 4.3 March 2025 will treat with antibiotics follow-up again in 6 weeks if not improved will refer to urology discussed with patient    umbilical hernia --- did see Adonay Sainz September 2021--wants to lose wgt first--- march 2022    Mild reactive depression, --cont Celexa 20 mg  Qd , Wellbutrin  daily    HTN -  Cont lisinopril 40 mg once a day, doxazosin 4 mg daily at bedtime, spironolactone 25 mg daily---workup to include  plasma metanephrine, renin, aldosterone and chemistry panels, August 2018---  All  normal--    LACUNAR INFARCT L BASAL GANGLIA, OLD , ON CT BRAIN JUNE 2018, -- echocardiogram,Normal ejection fraction otherwise unremarkable July 2018 carotid ultrasound, No significant stenosis July 2018,, Continue high-dose moderate dose statin therapy along with aspirin 81 mg daily,     S/P LAP CHOLEY MAY 2019, HARLEY --    Jc, on cpap, 2019     elevated chol --cont -ROUSAVASTATIN 10 MG QD ,     C SCOPE 2013, HARLEY ,Repeat colonoscopy September 2019, , Colonoscopy noted, one small tubular adenoma               Follow Up   Return for Next scheduled follow up.  Patient was given instructions and counseling regarding his condition or for health maintenance advice. Please see specific information pulled into the AVS if appropriate.

## 2025-03-25 ENCOUNTER — OFFICE VISIT (OUTPATIENT)
Dept: SLEEP MEDICINE | Facility: HOSPITAL | Age: 70
End: 2025-03-25
Payer: MEDICARE

## 2025-03-25 VITALS
SYSTOLIC BLOOD PRESSURE: 145 MMHG | BODY MASS INDEX: 36.64 KG/M2 | WEIGHT: 228 LBS | OXYGEN SATURATION: 96 % | DIASTOLIC BLOOD PRESSURE: 78 MMHG | HEART RATE: 67 BPM | HEIGHT: 66 IN

## 2025-03-25 DIAGNOSIS — G47.33 OSA (OBSTRUCTIVE SLEEP APNEA): Primary | ICD-10-CM

## 2025-03-25 PROCEDURE — 3077F SYST BP >= 140 MM HG: CPT | Performed by: STUDENT IN AN ORGANIZED HEALTH CARE EDUCATION/TRAINING PROGRAM

## 2025-03-25 PROCEDURE — 3078F DIAST BP <80 MM HG: CPT | Performed by: STUDENT IN AN ORGANIZED HEALTH CARE EDUCATION/TRAINING PROGRAM

## 2025-03-25 PROCEDURE — 1159F MED LIST DOCD IN RCRD: CPT | Performed by: STUDENT IN AN ORGANIZED HEALTH CARE EDUCATION/TRAINING PROGRAM

## 2025-03-25 PROCEDURE — 99213 OFFICE O/P EST LOW 20 MIN: CPT | Performed by: STUDENT IN AN ORGANIZED HEALTH CARE EDUCATION/TRAINING PROGRAM

## 2025-03-25 PROCEDURE — 1160F RVW MEDS BY RX/DR IN RCRD: CPT | Performed by: STUDENT IN AN ORGANIZED HEALTH CARE EDUCATION/TRAINING PROGRAM

## 2025-03-25 PROCEDURE — G0463 HOSPITAL OUTPT CLINIC VISIT: HCPCS | Performed by: STUDENT IN AN ORGANIZED HEALTH CARE EDUCATION/TRAINING PROGRAM

## 2025-03-25 NOTE — PROGRESS NOTES
"  [facility]    SLEEP CLINIC FOLLOW UP PROGRESS NOTE.    Dung Burch  [id number]   1955  69 y.o.  male      PCP: Isaac Matta MD    Date of visit: 3/25/2025    No chief complaint on file.      HPI:  This is a 69 y.o. years old patient is here for the management of obstructive sleep apnea.    Last sleep apnea testing was on September 18, 2018 with a weight of 222 pounds which revealed an DULCE of 43/hr and 84.5 minutes of time with oxygen saturation less than 90%.  He has been using his CPAP machine and has a DreamStation 2 replacement device from the recall and his machine is from 2018 and he has been using his device well and he is on AutoPap 9 to 15 cm H2O. he is using nasal pillows mask and denies any dry mouth.  He does report some shortness of breath which he has undergone some evaluation for already.   Patient Goes to bed at 11:30 PM and gets up at 7 AM. Wakes up 1 or 2 times per night to use the restroom.  He does take a 1 to 2-hour nap daily around 130 or 2 PM  ESS: 2    PAP download data dates February 15 through March 16, 2025  Device: DreamStation 2 auto CPAP  Mask type:nasal pillows, no dry mouth   Pressure : 9 to 15 cm, 90th percentile pressure 10.2  % days used >4 hours: 93.3  Average usage days used: 7 hours 8 minutes  AHI: 0.6  Average time in large leak per day 2 seconds  DME supplier: [employer] (habits pertaining to sleep medicine)  History tobacco use: None  History of alcohol use: 4-6 beers per day   Caffeine use: 2 cups of coffee per day      REVIEW OF SYSTEMS:   Pertaining positive symptoms are:  Shortness of breath, morning headache      PHYSICAL EXAMINATION:  CONSTITUTIONAL:  Vitals:    03/25/25 1000   BP: 145/78   BP Location: Left arm   Patient Position: Sitting   Pulse: 67   SpO2: 96%   Weight: 103 kg (228 lb)   Height: 168.9 cm (66.5\")    Body mass index is 36.25 kg/m².   RESP SYSTEM: No respiratory distress  CARDIOVASULAR: Regular " rate  NEURO: Answers questions appropriately       ASSESSMENT AND PLAN:  Obstructive sleep apnea ( G 47.33)  I have independently reviewed the PAP compliance data and discussed with the patient the download data and encouarged the patient to continue to use the device. The device is benefiting the patient and the device is medically necessary.  The patient is also instructed to get the supplies from the [employer] and and change them on a regular basis.  A prescription for supplies has been sent to the [employer] company.   His machine is over 5 years old. I have ordered new CPAP machine with setting 9 to 15 cmH2O. Recommended continued usage of PAP.  Return for 31 to 90 days after PAP setup. . Patient's questions were answered.      Garcia Bar,

## 2025-03-31 ENCOUNTER — TELEPHONE (OUTPATIENT)
Dept: INTERNAL MEDICINE | Age: 70
End: 2025-03-31
Payer: MEDICARE

## 2025-03-31 DIAGNOSIS — E29.1 HYPOGONADISM IN MALE: ICD-10-CM

## 2025-03-31 NOTE — TELEPHONE ENCOUNTER
Spoke w/[facility] about testosterone cypionate.   Pt will need a 2wk early fill as one vial was rendered contaminated during administration.  Needs Dr. Matta's ok for early fill. Pt has one vial on hand, non-emergent.

## 2025-04-01 RX ORDER — TESTOSTERONE CYPIONATE 200 MG/ML
200 INJECTION, SOLUTION INTRAMUSCULAR
Qty: 2 ML | Refills: 5 | Status: SHIPPED | OUTPATIENT
Start: 2025-04-01

## 2025-04-02 RX ORDER — CITALOPRAM HYDROBROMIDE 20 MG/1
20 TABLET ORAL DAILY
Qty: 90 TABLET | Refills: 0 | Status: SHIPPED | OUTPATIENT
Start: 2025-04-02

## 2025-04-08 ENCOUNTER — LAB (OUTPATIENT)
Dept: LAB | Facility: HOSPITAL | Age: 70
End: 2025-04-08
Payer: MEDICARE

## 2025-04-08 DIAGNOSIS — R97.20 ELEVATED PSA: ICD-10-CM

## 2025-04-08 DIAGNOSIS — R73.01 IFG (IMPAIRED FASTING GLUCOSE): ICD-10-CM

## 2025-04-08 LAB — PSA SERPL-MCNC: 13.6 NG/ML (ref 0–4)

## 2025-04-08 PROCEDURE — 36415 COLL VENOUS BLD VENIPUNCTURE: CPT

## 2025-04-08 PROCEDURE — 84153 ASSAY OF PSA TOTAL: CPT

## 2025-04-09 ENCOUNTER — OFFICE VISIT (OUTPATIENT)
Dept: INTERNAL MEDICINE | Age: 70
End: 2025-04-09
Payer: MEDICARE

## 2025-04-09 VITALS
SYSTOLIC BLOOD PRESSURE: 163 MMHG | OXYGEN SATURATION: 94 % | BODY MASS INDEX: 35.68 KG/M2 | HEIGHT: 66 IN | TEMPERATURE: 98 F | HEART RATE: 107 BPM | DIASTOLIC BLOOD PRESSURE: 92 MMHG | WEIGHT: 222 LBS

## 2025-04-09 DIAGNOSIS — R39.12 BENIGN PROSTATIC HYPERPLASIA WITH WEAK URINARY STREAM: ICD-10-CM

## 2025-04-09 DIAGNOSIS — N40.1 BENIGN PROSTATIC HYPERPLASIA WITH WEAK URINARY STREAM: ICD-10-CM

## 2025-04-09 DIAGNOSIS — R97.20 ELEVATED PSA: Primary | ICD-10-CM

## 2025-04-09 RX ORDER — SULFAMETHOXAZOLE AND TRIMETHOPRIM 800; 160 MG/1; MG/1
1 TABLET ORAL 2 TIMES DAILY
Qty: 20 TABLET | Refills: 0 | Status: SHIPPED | OUTPATIENT
Start: 2025-04-09

## 2025-04-09 NOTE — PROGRESS NOTES
"Chief Complaint   Patient presents with    Elevated PSA     Lab follow up.    Patient thinks Cialis has helped some, no hematuria, stream comes and goes,    Objective   Vital Signs  Vitals:    04/09/25 1436   BP: 163/92   BP Location: Left arm   Patient Position: Sitting   Pulse: 107   Temp: 98 °F (36.7 °C)   SpO2: 94%   Weight: 101 kg (222 lb)   Height: 168.9 cm (66.5\")      Body mass index is 35.3 kg/m².  Review of Systems no significant urinary symptoms, no fevers no blood in the urine, no hesitancy  Physical Exam patient is alert and oriented x 3, no CVA tenderness abdomen soft obese nontender  Result Review :   No results found for: \"PROBNP\", \"BNP\"  CMP          11/8/2024    14:04 3/6/2025    10:48   CMP   Glucose 102  103    BUN 18  12    Creatinine 1.12  1.17    EGFR 71.1  67.5    Sodium 137  138    Potassium 4.4  4.5    Chloride 102  101    Calcium 8.7  9.7    Total Protein 6.7  7.2    Albumin 4.2  4.5    Globulin 2.5  2.7    Total Bilirubin 0.3  0.6    Alkaline Phosphatase 56  63    AST (SGOT) 22  25    ALT (SGPT) 30  25    Albumin/Globulin Ratio 1.7  1.7    BUN/Creatinine Ratio 16.1  10.3    Anion Gap 9.9  11.5      CBC w/diff          11/8/2024    14:04   CBC w/Diff   WBC 6.75    RBC 4.74    Hemoglobin 15.7    Hematocrit 44.8    MCV 94.5    MCH 33.1    MCHC 35.0    RDW 13.6    Platelets 202    Neutrophil Rel % 69.5    Immature Granulocyte Rel % 1.3    Lymphocyte Rel % 14.2    Monocyte Rel % 11.6    Eosinophil Rel % 2.8    Basophil Rel % 0.6       Lipid Panel          3/6/2025    10:48   Lipid Panel   Total Cholesterol 130    Triglycerides 128    HDL Cholesterol 50    VLDL Cholesterol 23    LDL Cholesterol  57    LDL/HDL Ratio 1.09       Lab Results   Component Value Date    TSH 1.380 03/06/2025    TSH 0.920 06/28/2023    TSH 0.671 03/27/2023      Lab Results   Component Value Date    FREET4 1.24 03/06/2025    FREET4 1.26 06/28/2023    FREET4 1.17 03/27/2023      A1C Last 3 Results          3/6/2025    " 10:48   HGBA1C Last 3 Results   Hemoglobin A1C 5.90       PSA          3/6/2025    10:48 4/8/2025    10:02   PSA   PSA 4.340  13.600                     Visit Diagnoses:    ICD-10-CM ICD-9-CM   1. Elevated PSA  R97.20 790.93   2. Benign prostatic hyperplasia with weak urinary stream  N40.1 600.01    R39.12 788.62       Assessment and Plan   Diagnoses and all orders for this visit:    1. Elevated PSA (Primary)    2. Benign prostatic hyperplasia with weak urinary stream    PSA --elevated PSA compared to the last 3 to 5 years, 4.3 March 2025 =========will treat with antibiotics, Levaquin called in March 13 he is finished that course,------------ PSA follow-up follow-up again in 6 weeks if not improved will refer to urology discussed with patient up to 13.6 etiology is unclear jumped up considerably in the last month indicating infection, will need urology consultation, patient is to start Flomax, in addition to another round of antibiotics i.e. Bactrim, will be given for another 10 days, and referral to urology placed,, patient was recommended to stop taking Depo testosterone for now, he can send okay to continue Cialis,    Medicare wellness completed February 14, 2025    Shortness of breath, chest x-ray no active disease January 28, 2025    Urgency frequency decreased stream, some, urge incontinence at times discussed treatment with Detrol, side effect profile March 13, 2025 versus Flomax, which causes some sexual dysfunctions which he does not want, versus Cialis daily 5 mg sent in to use daily, #35 refills to try,    Class 2 Severe Obesity (BMI >=35 and <=39.9). Obesity-related health conditions include the following: hypertension and GERD. Obesity is worsening. BMI is is above average; BMI management plan is completed. We discussed low calorie, low carb based diet program, portion control, and increasing exercise.  Treatment options discussed, consider use of metformin versus Wegovy or Trulicity etc., discussed May  2, 2024 a new prescription for zepbound/ was sent into [facility] [address]     Elevated creatinine possible CKD stage IIIa discussed diagnosis February 2025    Impaired fasting glucose 5.9 hemoglobin A1c March 2025 will consider reconsider GLP-1's Wegovy, == continues metformin 500 mg twice a day    hypogonadism,--started Depo testosterone injections every 2 weeks 200 mg/mL,     Hairy cell leukemia, status posttreatment Uma 15, 2022--with IV cladribine infusion for 7 days, Uma 15, 2022,-leukopenia, -------continued slow drop over the past 2 to 3 years since 2019, discussed with patient March 30, 2022 -- patient ended up going up to [facility] [address] [place] hairy cell leukemia diagnosis, currently monitoring without aggressive treatment, September 2022,, and December 7, 2022, and March 28, 2023,, labs stable September 2023,    umbilical hernia --- did see Adonay Sainz September 2021--wants to lose wgt first--- march 2022    Mild reactive depression, --cont Celexa 20 mg  Qd , Wellbutrin  daily    HTN -  Cont lisinopril 40 mg once a day, doxazosin 4 mg daily at bedtime, spironolactone 25 mg daily---workup to include plasma metanephrine, renin, aldosterone and chemistry panels, August 2018---  All  normal--    LACUNAR INFARCT L BASAL GANGLIA, OLD , ON CT BRAIN JUNE 2018, -- echocardiogram,Normal ejection fraction otherwise unremarkable July 2018 carotid ultrasound, No significant stenosis July 2018,, Continue high-dose moderate dose statin therapy along with aspirin 81 mg daily,     S/P LAP CHOLEY MAY 2019, [place] --    Jc, on cpap, 2019     elevated chol --cont -ROUSAVASTATIN 10 MG QD ,     C SCOPE 2013, [place] ,Repeat colonoscopy September 2019, , Colonoscopy noted, one small tubular adenoma                 Follow Up   Return for Next scheduled follow up.  Patient was given instructions and counseling regarding his condition or for health maintenance advice. Please see specific information pulled into  the AVS if appropriate.

## 2025-04-22 NOTE — PROGRESS NOTES
Chief Complaint: Elevated PSA (Pt. Says he has weak stream), new patient, and Urinary Frequency    Patient or patient representative verbalized consent for the use of Ambient Listening during the visit with  LETI Garcia for chart documentation. 4/23/2025  08:15 EDT    Subjective           History of Present Illness  The patient is a 69-year-old male who presents for evaluation of elevated PSA and BPH.    His March April of this year recent blood work revealed an elevated PSA level, prompting Dr. Boo Matta to initiate a 10-day course of Bactrim, which he completed last week.  He denies any history of elevated PSA previously.  He does report a history of prostatitis.  His PSA when checked in March was 4.34.  When his PSA was rechecked in April it was 13.6.  He has been on testosterone therapy for several years but discontinued it approximately a month ago as per Dr. Matta's advice once his PSA was found to be. He is currently on a daily regimen of tadalafil, which he reports he does not take for erectile dysfunction, but more so for BPH. He has not previously been prescribed Flomax or other similar agents for BPH.    He reports urinary difficulties that have persisted for several years, including occasional incontinence, frequent urination, urgency, and nocturia (2 to 4 times per night). He also experiences pain in the prostate region, which has been a chronic issue, and occasional sharp pains. He reports no dysuria or hematuria. His urinary stream is generally normal, with occasional weakness. He has a history of kidney stones, having passed one approximately 30 years ago. He has not undergone any other urological surgeries apart from vasectomy.    He has a history of hairy cell leukemia, which has resulted in decreased energy levels, and he found the testosterone therapy beneficial in this regard. He is currently not undergoing any treatment for leukemia currently and follows up once a year.    He has a  history of high blood pressure and borderline diabetes. He has a history of mini stroke and stroke. He has sleep apnea and is wearing a CPAP machine. He takes baby aspirin as his only blood thinner.    SOCIAL HISTORY  He drinks quite a bit of beer. He does not use tobacco.    FAMILY HISTORY  He does not have a family history of prostate cancer, bladder cancer, or kidney cancer.    MEDICATIONS  Current: tadalafil, baby aspirin  Discontinued: testosterone, Bactrim    Urinary symptoms/history:   Frequency-admits     Urgency-admits     Incontinence-admits, due to urgency     Nocturia-admits, 2-4 X per night     Dysuria-denies     Perineal pain-admits     Stream-weak at times     GH-denies     History of stones-admits, 30 years ago, was able to pass that stone      surgeries-vasectomy     Family history of  malignancy-denies     Cardiopulmonary-HTN, borderline diabetes, hairy cell leukemia , CVA, TIA, SHANI on CPAP     Anticoagulants-ASA 81mg     Smoker-denies     PSA  4/8/2025 13.6  3/6/2025 4.34  3/27/2023 1.7  1/5/2023 1.51  7/25/2022 1.78  3/29/2022 1.58  2//2021 1.49      Objective     Past Medical History:   Diagnosis Date    Cervical root disorders, not elsewhere classified     Elevated PSA 2025    Essential (primary) hypertension     ETOH abuse     Hairy cell leukemia     High cholesterol     Impaired fasting glucose     Pure hypercholesterolemia     Sleep apnea, unspecified     Transient cerebral ischemic attack, unspecified     Urinary incontinence 2017    Vitamin D deficiency, unspecified        Past Surgical History:   Procedure Laterality Date    CHOLECYSTECTOMY  08/2019    ENDOSCOPY AND COLONOSCOPY  2008    KNEE SURGERY Right     OTHER SURGICAL HISTORY      TUBAL ADENOMA    OTHER SURGICAL HISTORY Left     CHEEK    THYROID CYST EXCISION  2000    VASECTOMY  1990         Current Outpatient Medications:     aspirin 81 MG chewable tablet, aspirin 81 mg oral tablet,chewable chew 1 tablet (81 mg) by oral route  once daily   Active, Disp: , Rfl:     Biotin 93969 MCG tablet, Take 30 mcg by mouth Daily As Needed., Disp: , Rfl:     buPROPion XL (Wellbutrin XL) 300 MG 24 hr tablet, Take 1 tablet by mouth Daily., Disp: 90 tablet, Rfl: 3    cholecalciferol (VITAMIN D3) 25 MCG (1000 UT) tablet, , Disp: , Rfl:     citalopram (CeleXA) 20 MG tablet, TAKE 1 TABLET BY MOUTH EVERY DAY, Disp: 90 tablet, Rfl: 0    coenzyme Q10 100 MG capsule, Take 1 capsule by mouth Daily., Disp: , Rfl:     Cyanocobalamin (Vitamin B-12) 5000 MCG sublingual tablet, Place  under the tongue., Disp: , Rfl:     diphenoxylate-atropine (Lomotil) 2.5-0.025 MG per tablet, Take 1 tablet by mouth 4 (Four) Times a Day As Needed for Diarrhea., Disp: 20 tablet, Rfl: 0    fluticasone (FLONASE) 50 MCG/ACT nasal spray, One -two sprays to each nostril daily, Disp: 16 g, Rfl: 1    Folic Acid-Vit B6-Vit B12 0.5-5-0.2 MG tablet, Take 1,000 mg by mouth., Disp: , Rfl:     GLUCOSAMINE-FISH OIL-EPA-DHA PO, Take  by mouth., Disp: , Rfl:     Lido-Menthol-Methyl Sal-Camph (CBD KINGS EX), Apply 1 tablet topically., Disp: , Rfl:     lisinopril (PRINIVIL,ZESTRIL) 40 MG tablet, Take 1 tablet by mouth Daily., Disp: 90 tablet, Rfl: 1    loratadine (CLARITIN) 10 MG tablet, loratadine 10 mg oral tablet take 1 tablet (10 mg) by oral route once daily   Active, Disp: , Rfl:     magnesium gluconate (MAGONATE) 500 MG tablet, Take 1 tablet by mouth 2 (Two) Times a Day., Disp: , Rfl:     metFORMIN (GLUCOPHAGE) 500 MG tablet, TAKE 1 TABLET BY MOUTH TWICE DAILY WITH MEALS, Disp: 120 tablet, Rfl: 5    Pregnenolone Micronized (PREGNENOLONE PO), Take  by mouth., Disp: , Rfl:     rosuvastatin (CRESTOR) 10 MG tablet, TAKE 1 TABLET BY MOUTH DAILY, Disp: 90 tablet, Rfl: 3    spironolactone (ALDACTONE) 25 MG tablet, TAKE 1 TABLET BY MOUTH EVERY DAY, Disp: 90 tablet, Rfl: 3    sulfamethoxazole-trimethoprim (Bactrim DS) 800-160 MG per tablet, Take 1 tablet by mouth 2 (Two) Times a Day., Disp: 20 tablet, Rfl:  "0    tadalafil (Cialis) 5 MG tablet, Take 1 tablet by mouth Daily., Disp: 30 tablet, Rfl: 5    Tirzepatide-Weight Management (ZEPBOUND) 2.5 MG/0.5ML solution auto-injector, Inject 0.5 mL under the skin into the appropriate area as directed 1 (One) Time Per Week., Disp: 2 mL, Rfl: 5    zinc sulfate (ZINCATE) 220 (50 Zn) MG capsule, Take 50 mg by mouth Daily., Disp: , Rfl:     tamsulosin (FLOMAX) 0.4 MG capsule 24 hr capsule, Take 1 capsule by mouth Daily., Disp: 90 capsule, Rfl: 4    No Known Allergies     Family History   Problem Relation Age of Onset    Cancer Mother     Cancer Father     Hyperlipidemia Father     COPD Father     Hypertension Father     Cancer Paternal Grandfather        Social History     Socioeconomic History    Marital status:    Tobacco Use    Smoking status: Never     Passive exposure: Never    Smokeless tobacco: Never   Vaping Use    Vaping status: Never Used   Substance and Sexual Activity    Alcohol use: Yes     Alcohol/week: 21.0 standard drinks of alcohol     Types: 21 Cans of beer per week     Comment: Amstel lite 3.5% alcohol    Drug use: Never    Sexual activity: Yes     Partners: Female     Birth control/protection: None, Vasectomy       Vital Signs:   Resp 12   Ht 168.9 cm (66.5\")   Wt 101 kg (223 lb 9.6 oz)   BMI 35.55 kg/m²      Physical Exam  Vitals and nursing note reviewed.   Constitutional:       General: He is not in acute distress.     Appearance: Normal appearance. He is not toxic-appearing.   Pulmonary:      Effort: Pulmonary effort is normal. No respiratory distress.   Neurological:      General: No focal deficit present.      Mental Status: He is alert and oriented to person, place, and time.          Result Review :   The following data was reviewed by: LETI Garcia on 04/23/2025:  Results for orders placed or performed in visit on 04/23/25   Bladder Scan    Collection Time: 04/23/25  8:04 AM   Result Value Ref Range    Urine Volume 64       PSA          " 3/6/2025    10:48 4/8/2025    10:02   PSA   PSA 4.340  13.600      Bladder Scan interpretation 04/23/2025    Estimation of residual urine via BVI 3000 [place] Bladder Scan  MA/nurse performing: NATALIE Hurt   Residual Urine: 64 ml  Indication: Elevated prostate specific antigen (PSA)    Benign prostatic hyperplasia with weak urinary stream   Position: Supine  Examination: Incremental scanning of the suprapubic area using 2.0 MHz transducer using copious amounts of acoustic gel.   Findings: An anechoic area was demonstrated which represented the bladder, with measurement of residual urine as noted. I inspected this myself. In that the residual urine was stable or insignificant, refer to plan for treatment and plan necessary at this time.         Results  Laboratory Studies  PSA level was 13.6 on 04/08/2025, up from 4.34 in March 2025.        Procedures        Assessment and Plan    Diagnoses and all orders for this visit:    1. Elevated prostate specific antigen (PSA) (Primary)  -     Bladder Scan  -     PSA DIAGNOSTIC; Future  -     MRI Prostate With & Without Contrast; Future    2. Benign prostatic hyperplasia with weak urinary stream  -     tamsulosin (FLOMAX) 0.4 MG capsule 24 hr capsule; Take 1 capsule by mouth Daily.  Dispense: 90 capsule; Refill: 4        Assessment & Plan  1. Elevated Prostate-Specific Antigen (PSA).  His PSA levels have shown a significant increase from 4.34 in March 2025 to 13.6 in April 2025. This could be attributed to various factors such as prostate infection, medication use, enlarged prostate or prostate malignancy. He has been on testosterone therapy for several years, which could potentially contribute to the elevated PSA levels. However, he did have a recent abrupt increase in these levels which leads me to believe that he either has a prostatitis or a possible malignancy.  He discontinued this testosterone therapy approximately a month ago. He exhibits symptoms indicative of an  enlarged prostate, including frequency, urgency, occasional leakage, and a poor stream. A repeat PSA test will be conducted today to monitor the levels. An MRI of the prostate will also be ordered to provide additional information regarding the size of the prostate, potential signs of prostatitis or previous prostatitis, and any suspicious lesions that may indicate prostate cancer.  We will go ahead and order an MRI of the prostate to delineate any underlying etiology of elevated PSA and potentially provide mapping for fusion biopsy.      2. Benign Prostatic Hyperplasia (BPH).  He reports symptoms of frequency, urgency, occasional leakage, and a poor stream, which are indicative of BPH. He is currently taking tadalafil daily, which helps some with these symptoms.  We did discuss the possibility of having him stop the tadalafil and start Flomax, but he is reluctant to stop this tadalafil.  He reports that he is not really taking this medication to help with erections, but is afraid that if he stops the medication he could have a negative effect on his erections and sex life.  We did discuss that concurrent use of Flomax and tadalafil is generally not recommended, but given the fact that the patient is having significant urinary symptoms and did have a small amount of residual urine on PVR we are going to trial him on combination treatment of Flomax and tadalafil.   He has been advised to take Flomax at night to minimize potential side effects such as dizziness. If he experiences any adverse effects, he should notify us immediately.    3. Prostatitis.  He has been treated for a prostate infection in the recently completed a 10-day course of Bactrim. Despite this treatment, his symptoms have not improved significantly. The MRI of the prostate will help determine if there are signs of ongoing or previous prostatitis. If the MRI shows signs of prostatitis, a longer course of antibiotics may be considered.    4.  Medication Management.  He is currently taking tadalafil and will start Flomax. He has been advised that if he experiences any adverse effects such as dizziness and to inform us immediately.    Will plan to follow-up with the patient 1 week after his MRI to go over those results.      Follow Up   Return for 1 week after MRI.  Patient was given instructions and counseling regarding his condition or for health maintenance advice. Please see specific information pulled into the AVS if appropriate.         This document has been electronically signed by LETI Garcia  April 23, 2025 12:13 EDT

## 2025-04-23 ENCOUNTER — OFFICE VISIT (OUTPATIENT)
Dept: UROLOGY | Age: 70
End: 2025-04-23
Payer: MEDICARE

## 2025-04-23 ENCOUNTER — LAB (OUTPATIENT)
Facility: HOSPITAL | Age: 70
End: 2025-04-23
Payer: MEDICARE

## 2025-04-23 VITALS — HEIGHT: 67 IN | BODY MASS INDEX: 35.09 KG/M2 | RESPIRATION RATE: 12 BRPM | WEIGHT: 223.6 LBS

## 2025-04-23 DIAGNOSIS — N40.1 BENIGN PROSTATIC HYPERPLASIA WITH WEAK URINARY STREAM: ICD-10-CM

## 2025-04-23 DIAGNOSIS — R97.20 ELEVATED PROSTATE SPECIFIC ANTIGEN (PSA): Primary | ICD-10-CM

## 2025-04-23 DIAGNOSIS — R97.20 ELEVATED PROSTATE SPECIFIC ANTIGEN (PSA): ICD-10-CM

## 2025-04-23 DIAGNOSIS — R39.12 BENIGN PROSTATIC HYPERPLASIA WITH WEAK URINARY STREAM: ICD-10-CM

## 2025-04-23 LAB
PSA SERPL-MCNC: 6.41 NG/ML (ref 0–4)
URINE VOLUME: 64

## 2025-04-23 PROCEDURE — 1160F RVW MEDS BY RX/DR IN RCRD: CPT | Performed by: NURSE PRACTITIONER

## 2025-04-23 PROCEDURE — 1159F MED LIST DOCD IN RCRD: CPT | Performed by: NURSE PRACTITIONER

## 2025-04-23 PROCEDURE — 84153 ASSAY OF PSA TOTAL: CPT

## 2025-04-23 PROCEDURE — 36415 COLL VENOUS BLD VENIPUNCTURE: CPT

## 2025-04-23 PROCEDURE — 99213 OFFICE O/P EST LOW 20 MIN: CPT | Performed by: NURSE PRACTITIONER

## 2025-04-23 RX ORDER — ZINC SULFATE 50(220)MG
50 CAPSULE ORAL DAILY
COMMUNITY

## 2025-04-23 RX ORDER — UBIDECARENONE 100 MG
100 CAPSULE ORAL DAILY
COMMUNITY

## 2025-04-23 RX ORDER — TAMSULOSIN HYDROCHLORIDE 0.4 MG/1
1 CAPSULE ORAL DAILY
Qty: 90 CAPSULE | Refills: 4 | Status: SHIPPED | OUTPATIENT
Start: 2025-04-23

## 2025-04-23 RX ORDER — MAGNESIUM GLUCONATE 27 MG(500)
500 TABLET ORAL 2 TIMES DAILY
COMMUNITY

## 2025-04-23 RX ORDER — GLUCOSAMINE/D3/BOSWELLIA SERRA 1500MG-400
30 TABLET ORAL DAILY PRN
COMMUNITY

## 2025-05-01 ENCOUNTER — HOSPITAL ENCOUNTER (OUTPATIENT)
Facility: HOSPITAL | Age: 70
Discharge: HOME | End: 2025-05-01
Admitting: NURSE PRACTITIONER
Payer: MEDICARE

## 2025-05-01 DIAGNOSIS — R97.20 ELEVATED PROSTATE SPECIFIC ANTIGEN (PSA): ICD-10-CM

## 2025-05-01 PROCEDURE — 25510000002 GADOBENATE DIMEGLUMINE 529 MG/ML SOLUTION: Performed by: NURSE PRACTITIONER

## 2025-05-01 PROCEDURE — 72197 MRI PELVIS W/O & W/DYE: CPT

## 2025-05-01 PROCEDURE — A9577 INJ MULTIHANCE: HCPCS | Performed by: NURSE PRACTITIONER

## 2025-05-01 RX ADMIN — GADOBENATE DIMEGLUMINE 20 ML: 529 INJECTION, SOLUTION INTRAVENOUS at 09:49

## 2025-05-07 NOTE — PROGRESS NOTES
Chief Complaint: Elevated PSA    Patient or patient representative verbalized consent for the use of Ambient Listening during the visit with  LETI Garcia for chart documentation. 5/8/2025  09:52 EDT    Subjective         History of Present Illness  Dung Burch is a 69 y.o. male presents to [facility] to be seen for follow-up.    The patient was previously seen in the office on 4/23/2025 for elevated PSA and BPH with weak urinary stream.  At that visit, the patient was scheduled for MRI of the prostate.  He was also to have his PSA repeated.  His PSA on 4/23/2025 had decreased significantly from 13.6-6.41.  He was also started on tamsulosin.  He was also taking tadalafil daily.  He is here today to follow-up.    MRI results were reviewed in detail with the patient and his wife.  The MRI did show a moderately enlarged prostate of 61 cc.  There were signs of chronic prostatitis.  There were no findings suspicious for prostate malignancy.  The patient's PSA density given the most recent PSA of 6.41 and a prostate size of 61 cc is within normal limits at 0.10.    He reports a perceived improvement in his condition today compared to previous days. He has been on a regimen of Flomax, one tablet daily, for the past two weeks, which he tolerates well without any adverse effects. He also takes generic Cialis daily. He has observed a slight enhancement in his urinary stream and believes he is achieving better bladder emptying. He expresses interest in potential treatments to reduce the size of his prostate. He discontinued testosterone therapy over a month ago due his elevated PSA and has noticed a decrease in energy levels since stopping testosterone, necessitating morning and afternoon naps and rest periods following strenuous activities. He is considering resuming testosterone injections to improve his energy levels during travel, despite the potential for elevated PSA levels.      MEDICATIONS  Flomax, Cialis    MRI OF THE PROSTATE WITHOUT AND WITH INTRAVENOUS CONTRAST, 5/1/2025:     CLINICAL HISTORY: Elevated PSA (6.4 on 04/23/2025).     TECHNIQUE: Using a 3 Geena magnet, large field-of-view coronal T2 and  axial STIR sequences were obtained through the pelvis, followed by small  field-of-view axial T2, coronal T2, sagittal T2, axial diffusion  weighted imaging with ADC and axial gradient echo T1 fat-sat dynamic  intravenous contrast-enhanced sequences through the region of the  prostate gland. Large field-of-view axial T1 fat-sat pre-contrast and  post-contrast sequences were also obtained through the pelvis. 20 cc of  intravenous MultiHance was administered.     COMPARISON: CT of the abdomen and pelvis from 01/05/2023. No prior  prostate MRI for comparison.     FINDINGS:     The prostate gland is moderately enlarged, with an estimated volume of  61 cc.     There are multiple BPH nodules within the mildly enlarged transition  zone. There is no suspicious lesion within the transition zone. There is  a mild-moderate degree of scattered striated/linear and wedge-shaped  areas of low T2 signal throughout the peripheral zone, compatible with  chronic prostatitis/fibrosis, without suspicious lesion within the  peripheral zone.     There are no enlarged pelvic lymph nodes. There is no suspicious lesion  in the imaged bones.      There is mild trabeculation of the wall of the urinary bladder, likely  due to muscular hypertrophy from chronic bladder outlet obstruction.  There is asymmetrically prominent fat within the left inguinal canal  which is probably due to herniation of intrapelvic fat, without bowel  herniation, unchanged from the prior CT. There is mild diverticulosis of  the sigmoid colon, without evidence of diverticulitis in the pelvis.  There is a small fat-containing umbilical hernia, also seen on the prior  CT.     IMPRESSION:     1. No MR findings suspicious for malignancy in the  prostate gland.     2. Moderately enlarged prostate (estimated volume of 61 cc) with  multiple BPH nodules within an enlarged transition zone.     3. Moderate degree of chronic prostatitis/fibrosis scattered throughout  the peripheral zone.     4. No evidence of metastatic disease within the pelvis.     This report was finalized on 5/7/2025 9:51 PM by Dr. Marcin Reyes M.D on  Workstation: [id number]    PSA  4/23/2025 6.41 with a PSA density 0.10    Previous visit 4/23/2025:  The patient is a 69-year-old male who presents for evaluation of elevated PSA and BPH.     His March April of this year recent blood work revealed an elevated PSA level, prompting Dr. Boo Matta to initiate a 10-day course of Bactrim, which he completed last week.  He denies any history of elevated PSA previously.  He does report a history of prostatitis.  His PSA when checked in March was 4.34.  When his PSA was rechecked in April it was 13.6.  He has been on testosterone therapy for several years but discontinued it approximately a month ago as per Dr. Matta's advice once his PSA was found to be. He is currently on a daily regimen of tadalafil, which he reports he does not take for erectile dysfunction, but more so for BPH. He has not previously been prescribed Flomax or other similar agents for BPH.     He reports urinary difficulties that have persisted for several years, including occasional incontinence, frequent urination, urgency, and nocturia (2 to 4 times per night). He also experiences pain in the prostate region, which has been a chronic issue, and occasional sharp pains. He reports no dysuria or hematuria. His urinary stream is generally normal, with occasional weakness. He has a history of kidney stones, having passed one approximately 30 years ago. He has not undergone any other urological surgeries apart from vasectomy.     He has a history of hairy cell leukemia, which has resulted in decreased energy levels, and he  found the testosterone therapy beneficial in this regard. He is currently not undergoing any treatment for leukemia currently and follows up once a year.     He has a history of high blood pressure and borderline diabetes. He has a history of mini stroke and stroke. He has sleep apnea and is wearing a CPAP machine. He takes baby aspirin as his only blood thinner.     SOCIAL HISTORY  He drinks quite a bit of beer. He does not use tobacco.     FAMILY HISTORY  He does not have a family history of prostate cancer, bladder cancer, or kidney cancer.     MEDICATIONS  Current: tadalafil, baby aspirin  Discontinued: testosterone, Bactrim     Urinary symptoms/history:   Frequency-admits      Urgency-admits      Incontinence-admits, due to urgency      Nocturia-admits, 2-4 X per night      Dysuria-denies      Perineal pain-admits      Stream-weak at times      GH-denies      History of stones-admits, 30 years ago, was able to pass that stone       surgeries-vasectomy      Family history of  malignancy-denies      Cardiopulmonary-HTN, borderline diabetes, hairy cell leukemia , CVA, TIA, SHANI on CPAP      Anticoagulants-ASA 81mg      Smoker-denies      PSA  4/8/2025 13.6  3/6/2025 4.34  3/27/2023 1.7  1/5/2023 1.51  7/25/2022 1.78  3/29/2022 1.58  2//2021 1.49       Objective     Past Medical History:   Diagnosis Date    Cervical root disorders, not elsewhere classified     Elevated PSA 2025    Essential (primary) hypertension     ETOH abuse     Hairy cell leukemia     High cholesterol     Impaired fasting glucose     Pure hypercholesterolemia     Sleep apnea, unspecified     Transient cerebral ischemic attack, unspecified     Urinary incontinence 2017    Vitamin D deficiency, unspecified        Past Surgical History:   Procedure Laterality Date    CHOLECYSTECTOMY  08/2019    ENDOSCOPY AND COLONOSCOPY  2008    KNEE SURGERY Right     OTHER SURGICAL HISTORY      TUBAL ADENOMA    OTHER SURGICAL HISTORY Left     CHEEK    THYROID  CYST EXCISION  2000    VASECTOMY  1990         Current Outpatient Medications:     aspirin 81 MG chewable tablet, aspirin 81 mg oral tablet,chewable chew 1 tablet (81 mg) by oral route once daily   Active, Disp: , Rfl:     Biotin 62741 MCG tablet, Take 30 mcg by mouth Daily As Needed., Disp: , Rfl:     buPROPion XL (Wellbutrin XL) 300 MG 24 hr tablet, Take 1 tablet by mouth Daily., Disp: 90 tablet, Rfl: 3    cholecalciferol (VITAMIN D3) 25 MCG (1000 UT) tablet, , Disp: , Rfl:     citalopram (CeleXA) 20 MG tablet, TAKE 1 TABLET BY MOUTH EVERY DAY, Disp: 90 tablet, Rfl: 0    coenzyme Q10 100 MG capsule, Take 1 capsule by mouth Daily., Disp: , Rfl:     Cyanocobalamin (Vitamin B-12) 5000 MCG sublingual tablet, Place  under the tongue., Disp: , Rfl:     diphenoxylate-atropine (Lomotil) 2.5-0.025 MG per tablet, Take 1 tablet by mouth 4 (Four) Times a Day As Needed for Diarrhea., Disp: 20 tablet, Rfl: 0    fluticasone (FLONASE) 50 MCG/ACT nasal spray, One -two sprays to each nostril daily, Disp: 16 g, Rfl: 1    Folic Acid-Vit B6-Vit B12 0.5-5-0.2 MG tablet, Take 1,000 mg by mouth., Disp: , Rfl:     GLUCOSAMINE-FISH OIL-EPA-DHA PO, Take  by mouth., Disp: , Rfl:     Lido-Menthol-Methyl Sal-Camph (CBD KINGS EX), Apply 1 tablet topically., Disp: , Rfl:     lisinopril (PRINIVIL,ZESTRIL) 40 MG tablet, Take 1 tablet by mouth Daily., Disp: 90 tablet, Rfl: 1    loratadine (CLARITIN) 10 MG tablet, loratadine 10 mg oral tablet take 1 tablet (10 mg) by oral route once daily   Active, Disp: , Rfl:     magnesium gluconate (MAGONATE) 500 MG tablet, Take 1 tablet by mouth 2 (Two) Times a Day., Disp: , Rfl:     metFORMIN (GLUCOPHAGE) 500 MG tablet, TAKE 1 TABLET BY MOUTH TWICE DAILY WITH MEALS, Disp: 120 tablet, Rfl: 5    Pregnenolone Micronized (PREGNENOLONE PO), Take  by mouth., Disp: , Rfl:     rosuvastatin (CRESTOR) 10 MG tablet, TAKE 1 TABLET BY MOUTH DAILY, Disp: 90 tablet, Rfl: 3    spironolactone (ALDACTONE) 25 MG tablet, TAKE 1  "TABLET BY MOUTH EVERY DAY, Disp: 90 tablet, Rfl: 3    sulfamethoxazole-trimethoprim (Bactrim DS) 800-160 MG per tablet, Take 1 tablet by mouth 2 (Two) Times a Day., Disp: 20 tablet, Rfl: 0    tadalafil (Cialis) 5 MG tablet, Take 1 tablet by mouth Daily., Disp: 30 tablet, Rfl: 5    tamsulosin (FLOMAX) 0.4 MG capsule 24 hr capsule, Take 1 capsule by mouth Daily., Disp: 90 capsule, Rfl: 4    Tirzepatide-Weight Management (ZEPBOUND) 2.5 MG/0.5ML solution auto-injector, Inject 0.5 mL under the skin into the appropriate area as directed 1 (One) Time Per Week., Disp: 2 mL, Rfl: 5    zinc sulfate (ZINCATE) 220 (50 Zn) MG capsule, Take 50 mg by mouth Daily., Disp: , Rfl:     Not on File     Family History   Problem Relation Age of Onset    Cancer Mother     Cancer Father     Hyperlipidemia Father     COPD Father     Hypertension Father     Cancer Paternal Grandfather        Social History     Socioeconomic History    Marital status:    Tobacco Use    Smoking status: Never     Passive exposure: Never    Smokeless tobacco: Never   Vaping Use    Vaping status: Never Used   Substance and Sexual Activity    Alcohol use: Yes     Alcohol/week: 21.0 standard drinks of alcohol     Types: 21 Cans of beer per week     Comment: Amstel lite 3.5% alcohol    Drug use: Never    Sexual activity: Yes     Partners: Female     Birth control/protection: None, Vasectomy       Vital Signs:   Resp 20   Ht 167.6 cm (66\")   Wt 104 kg (230 lb)   BMI 37.12 kg/m²      Physical Exam  Vitals and nursing note reviewed.   Constitutional:       General: He is not in acute distress.     Appearance: Normal appearance. He is not toxic-appearing.   Pulmonary:      Effort: Pulmonary effort is normal. No respiratory distress.   Neurological:      General: No focal deficit present.      Mental Status: He is alert and oriented to person, place, and time.          Result Review :   The following data was reviewed by: LETI Garcia on " 05/08/2025:  Results for orders placed or performed in visit on 05/08/25   Bladder Scan    Collection Time: 05/08/25  9:18 AM   Result Value Ref Range    Volume: 43 ml       PSA          3/6/2025    10:48 4/8/2025    10:02 4/23/2025    09:10   PSA   PSA 4.340  13.600  6.410      Bladder Scan interpretation 05/08/2025    Estimation of residual urine via BVI 3000 Verathon Bladder Scan  MA/nurse performing: WARD Lo  Residual Urine: 43 ml  Indication: Elevated prostate specific antigen (PSA)    Benign prostatic hyperplasia with weak urinary stream   Position: Supine  Examination: Incremental scanning of the suprapubic area using 2.0 MHz transducer using copious amounts of acoustic gel.   Findings: An anechoic area was demonstrated which represented the bladder, with measurement of residual urine as noted. I inspected this myself. In that the residual urine was stable or insignificant, refer to plan for treatment and plan necessary at this time.         Results  Laboratory Studies  PSA was 6.41. PSA density was 0.10.    Imaging  MRI shows a moderately enlarged prostate with an estimated volume of about 61 cm³. Signs of chronic prostatitis were observed, but no acute infection requiring antibiotic treatment was found. No signs of high-grade prostate cancer were detected.        Procedures        Assessment and Plan    Diagnoses and all orders for this visit:    1. Elevated prostate specific antigen (PSA) (Primary)  -     Bladder Scan  -     PSA DIAGNOSTIC; Future  -     PSA DIAGNOSTIC; Future    2. Benign prostatic hyperplasia with weak urinary stream  -     Bladder Scan        Assessment & Plan  1. Enlarged prostate.  The MRI results confirmed a moderately enlarged prostate with an estimated volume of 61 cm³. The most recent PSA level was 6.41, and the PSA density was calculated to be 0.10, which is within normal limits. There were signs of chronic prostatitis but no acute infection requiring antibiotic treatment. The  bladder was noted to be slightly trabeculated, likely due to issues with emptying caused by the enlarged prostate. No evidence of prostate cancer was found. He is currently taking Flomax 0.4 mg daily and a generic form of Cialis daily, both of which he is tolerating well. He will continue taking Flomax to improve bladder emptying. A repeat PSA test will be scheduled in 3 months and again at 6 months to monitor levels closely.  I did caution the patient about restarting testosterone as this can certainly cause his PSA to rise again.  He does report that he is dealing with increased fatigue and not feeling as well as he did when he was on the testosterone.  He reports that he would prefer to feel well and be able to be more active.  He plans to discuss the potential of restarting testosterone with his primary care provider.  We did discuss the possibility of starting him on a medication such as finasteride to shrink the overall size of the prostate, but the patient declines due to concerns about potential side effects, namely sexual side effects.  Information on a prostate-healthy diet will be provided.    Follow-up  The patient will follow up in 6 months with PSA prior or sooner if needed for any worsening symptoms or new concerns.      Follow Up   Return in about 6 months (around 11/8/2025).  Patient was given instructions and counseling regarding his condition or for health maintenance advice. Please see specific information pulled into the AVS if appropriate.         This document has been electronically signed by LETI Garcia  May 8, 2025 09:56 EDT

## 2025-05-08 ENCOUNTER — OFFICE VISIT (OUTPATIENT)
Dept: UROLOGY | Age: 70
End: 2025-05-08
Payer: MEDICARE

## 2025-05-08 VITALS — WEIGHT: 230 LBS | HEIGHT: 66 IN | RESPIRATION RATE: 20 BRPM | BODY MASS INDEX: 36.96 KG/M2

## 2025-05-08 DIAGNOSIS — R39.12 BENIGN PROSTATIC HYPERPLASIA WITH WEAK URINARY STREAM: ICD-10-CM

## 2025-05-08 DIAGNOSIS — N40.1 BENIGN PROSTATIC HYPERPLASIA WITH WEAK URINARY STREAM: ICD-10-CM

## 2025-05-08 DIAGNOSIS — R97.20 ELEVATED PROSTATE SPECIFIC ANTIGEN (PSA): Primary | ICD-10-CM

## 2025-05-08 LAB — SPECIMEN VOL SMN: NORMAL ML

## 2025-05-08 PROCEDURE — 1160F RVW MEDS BY RX/DR IN RCRD: CPT | Performed by: NURSE PRACTITIONER

## 2025-05-08 PROCEDURE — 1159F MED LIST DOCD IN RCRD: CPT | Performed by: NURSE PRACTITIONER

## 2025-05-08 PROCEDURE — 99213 OFFICE O/P EST LOW 20 MIN: CPT | Performed by: NURSE PRACTITIONER

## 2025-05-27 ENCOUNTER — TELEPHONE (OUTPATIENT)
Dept: INTERNAL MEDICINE | Age: 70
End: 2025-05-27
Payer: MEDICARE

## 2025-05-27 DIAGNOSIS — I10 PRIMARY HYPERTENSION: ICD-10-CM

## 2025-05-27 DIAGNOSIS — D70.9 NEUTROPENIA, UNSPECIFIED TYPE: ICD-10-CM

## 2025-05-27 DIAGNOSIS — R73.01 IFG (IMPAIRED FASTING GLUCOSE): ICD-10-CM

## 2025-05-27 DIAGNOSIS — E29.1 HYPOGONADISM IN MALE: ICD-10-CM

## 2025-05-27 DIAGNOSIS — G45.9 TRANSIENT ISCHEMIC ATTACK: ICD-10-CM

## 2025-05-27 DIAGNOSIS — E66.812 CLASS 2 OBESITY DUE TO EXCESS CALORIES WITHOUT SERIOUS COMORBIDITY WITH BODY MASS INDEX (BMI) OF 39.0 TO 39.9 IN ADULT: ICD-10-CM

## 2025-05-27 DIAGNOSIS — E66.09 CLASS 2 OBESITY DUE TO EXCESS CALORIES WITHOUT SERIOUS COMORBIDITY WITH BODY MASS INDEX (BMI) OF 39.0 TO 39.9 IN ADULT: ICD-10-CM

## 2025-05-27 DIAGNOSIS — I10 HYPERTENSION, ESSENTIAL: ICD-10-CM

## 2025-05-27 DIAGNOSIS — E78.2 MIXED HYPERLIPIDEMIA: ICD-10-CM

## 2025-05-27 DIAGNOSIS — F33.1 MAJOR DEPRESSIVE DISORDER, RECURRENT, MODERATE: ICD-10-CM

## 2025-05-27 RX ORDER — BUPROPION HYDROCHLORIDE 300 MG/1
300 TABLET ORAL DAILY
Qty: 90 TABLET | Refills: 1 | Status: SHIPPED | OUTPATIENT
Start: 2025-05-27

## 2025-05-27 RX ORDER — LISINOPRIL 40 MG/1
40 TABLET ORAL DAILY
Qty: 90 TABLET | Refills: 3 | Status: SHIPPED | OUTPATIENT
Start: 2025-05-27

## 2025-06-25 ENCOUNTER — OFFICE VISIT (OUTPATIENT)
Dept: SLEEP MEDICINE | Facility: HOSPITAL | Age: 70
End: 2025-06-25
Payer: MEDICARE

## 2025-06-25 VITALS
HEIGHT: 66 IN | SYSTOLIC BLOOD PRESSURE: 152 MMHG | HEART RATE: 65 BPM | DIASTOLIC BLOOD PRESSURE: 76 MMHG | OXYGEN SATURATION: 96 % | BODY MASS INDEX: 36.96 KG/M2 | WEIGHT: 230 LBS

## 2025-06-25 DIAGNOSIS — G47.33 OSA (OBSTRUCTIVE SLEEP APNEA): Primary | ICD-10-CM

## 2025-06-25 PROCEDURE — 3077F SYST BP >= 140 MM HG: CPT | Performed by: STUDENT IN AN ORGANIZED HEALTH CARE EDUCATION/TRAINING PROGRAM

## 2025-06-25 PROCEDURE — 3078F DIAST BP <80 MM HG: CPT | Performed by: STUDENT IN AN ORGANIZED HEALTH CARE EDUCATION/TRAINING PROGRAM

## 2025-06-25 PROCEDURE — 1159F MED LIST DOCD IN RCRD: CPT | Performed by: STUDENT IN AN ORGANIZED HEALTH CARE EDUCATION/TRAINING PROGRAM

## 2025-06-25 PROCEDURE — 99213 OFFICE O/P EST LOW 20 MIN: CPT | Performed by: STUDENT IN AN ORGANIZED HEALTH CARE EDUCATION/TRAINING PROGRAM

## 2025-06-25 PROCEDURE — G0463 HOSPITAL OUTPT CLINIC VISIT: HCPCS

## 2025-06-25 PROCEDURE — 1160F RVW MEDS BY RX/DR IN RCRD: CPT | Performed by: STUDENT IN AN ORGANIZED HEALTH CARE EDUCATION/TRAINING PROGRAM

## 2025-06-25 RX ORDER — TESTOSTERONE CYPIONATE 200 MG/ML
200 INJECTION, SOLUTION INTRAMUSCULAR
COMMUNITY
Start: 2025-05-29

## 2025-06-25 NOTE — PROGRESS NOTES
"  [facility]    SLEEP CLINIC FOLLOW UP PROGRESS NOTE.    Dung Burch  [id number]   1955  69 y.o.  male      PCP: Isaac Matta MD    Date of visit: 6/25/2025    No chief complaint on file.      HPI:  This is a 69 y.o. years old patient is here for the management of obstructive sleep apnea.    Last sleep apnea testing was on September 18, 2018 with a weight of 222 pounds which revealed an DULCE of 43/hr and 84.5 minutes of time with oxygen saturation less than 90%.  Last visit a new CPAP machine was ordered and he now has an AirSense 10 AutoSet with settings of 9 to 15 cm  He reports doing well with the new machine.  He goes to bed at 10:30 PM and he falls asleep within 10 minutes and he wakes up 2 times during the night to use the restroom and does not have trouble falling back asleep and he gets up for the day at 6 AM.  He does take typically 1 nap during the day either in the morning or the afternoon.  He has no other sleep concerns at this time.     ESS: 2    PAP download data dates May 24 through June 22, 2025  Device: AirSense 10 AutoSet  Mask type: Nasal  Pressure : 9-15  % days used >4 hours: 100  Average usage days used: 9 hours 1 minute  AHI: 0.6  Median leak: 0.8  95th % leak 15.5  DME supplier: [employer] (habits pertaining to sleep medicine)  History tobacco use: 0  History of alcohol use: 35 beers per week  Caffeine use: 2 drinks per day      REVIEW OF SYSTEMS:   Pertaining positive symptoms are:  See scanned document      PHYSICAL EXAMINATION:  CONSTITUTIONAL:  Vitals:    06/25/25 0900   BP: 152/76   BP Location: Left arm   Patient Position: Sitting   Pulse: 65   SpO2: 96%   Weight: 104 kg (230 lb)   Height: 168.9 cm (66.5\")    Body mass index is 36.57 kg/m².   RESP SYSTEM: No respiratory distress  CARDIOVASULAR: Regular rate  NEURO: Answers questions appropriately              ASSESSMENT AND PLAN:  Diagnoses and all orders for this visit:    1. SHANI " (obstructive sleep apnea) (Primary)    He is doing well with residual AHI of 0.6.  Continue daily use with same settings.     I have independently reviewed the PAP compliance data and discussed with the patient the download data and encouarged the patient to continue to use the device. The device is benefiting the patient and the device is medically necessary.  The patient is also instructed to get the supplies from the [employer] and and change them on a regular basis.  .     Return in about 1 year (around 6/25/2026). . Patient's questions were answered.      Garcia Bar, DO

## 2025-07-01 RX ORDER — CITALOPRAM HYDROBROMIDE 20 MG/1
20 TABLET ORAL DAILY
Qty: 90 TABLET | Refills: 0 | Status: SHIPPED | OUTPATIENT
Start: 2025-07-01

## 2025-08-11 ENCOUNTER — LAB (OUTPATIENT)
Dept: LAB | Facility: HOSPITAL | Age: 70
End: 2025-08-11
Payer: MEDICARE

## 2025-08-11 DIAGNOSIS — E66.812 CLASS 2 OBESITY DUE TO EXCESS CALORIES WITHOUT SERIOUS COMORBIDITY WITH BODY MASS INDEX (BMI) OF 39.0 TO 39.9 IN ADULT: ICD-10-CM

## 2025-08-11 DIAGNOSIS — R97.20 ELEVATED PSA: ICD-10-CM

## 2025-08-11 DIAGNOSIS — G47.33 OSA ON CPAP: ICD-10-CM

## 2025-08-11 DIAGNOSIS — N40.1 BENIGN PROSTATIC HYPERPLASIA WITH WEAK URINARY STREAM: ICD-10-CM

## 2025-08-11 DIAGNOSIS — R73.01 IFG (IMPAIRED FASTING GLUCOSE): ICD-10-CM

## 2025-08-11 DIAGNOSIS — G45.9 TRANSIENT ISCHEMIC ATTACK: ICD-10-CM

## 2025-08-11 DIAGNOSIS — R97.20 ELEVATED PROSTATE SPECIFIC ANTIGEN (PSA): ICD-10-CM

## 2025-08-11 DIAGNOSIS — Z12.5 SCREENING PSA (PROSTATE SPECIFIC ANTIGEN): ICD-10-CM

## 2025-08-11 DIAGNOSIS — I10 PRIMARY HYPERTENSION: ICD-10-CM

## 2025-08-11 DIAGNOSIS — E78.2 MIXED HYPERLIPIDEMIA: ICD-10-CM

## 2025-08-11 DIAGNOSIS — D70.9 NEUTROPENIA, UNSPECIFIED TYPE: ICD-10-CM

## 2025-08-11 DIAGNOSIS — E29.1 HYPOGONADISM IN MALE: ICD-10-CM

## 2025-08-11 DIAGNOSIS — R39.12 BENIGN PROSTATIC HYPERPLASIA WITH WEAK URINARY STREAM: ICD-10-CM

## 2025-08-11 DIAGNOSIS — Z00.00 MEDICARE ANNUAL WELLNESS VISIT, SUBSEQUENT: ICD-10-CM

## 2025-08-11 DIAGNOSIS — E66.09 CLASS 2 OBESITY DUE TO EXCESS CALORIES WITHOUT SERIOUS COMORBIDITY WITH BODY MASS INDEX (BMI) OF 39.0 TO 39.9 IN ADULT: ICD-10-CM

## 2025-08-11 DIAGNOSIS — F33.1 MAJOR DEPRESSIVE DISORDER, RECURRENT, MODERATE: ICD-10-CM

## 2025-08-11 DIAGNOSIS — E55.9 VITAMIN D DEFICIENCY: ICD-10-CM

## 2025-08-11 DIAGNOSIS — C91.40 HAIRY CELL LEUKEMIA NOT HAVING ACHIEVED REMISSION: ICD-10-CM

## 2025-08-11 LAB
ALBUMIN SERPL-MCNC: 4.6 G/DL (ref 3.5–5.2)
ALBUMIN/GLOB SERPL: 1.8 G/DL
ALP SERPL-CCNC: 57 U/L (ref 39–117)
ALT SERPL W P-5'-P-CCNC: 21 U/L (ref 1–41)
ANION GAP SERPL CALCULATED.3IONS-SCNC: 13 MMOL/L (ref 5–15)
AST SERPL-CCNC: 21 U/L (ref 1–40)
BASOPHILS # BLD AUTO: 0.03 10*3/MM3 (ref 0–0.2)
BASOPHILS NFR BLD AUTO: 0.7 % (ref 0–1.5)
BILIRUB SERPL-MCNC: 0.8 MG/DL (ref 0–1.2)
BUN SERPL-MCNC: 13 MG/DL (ref 8–23)
BUN/CREAT SERPL: 12.3 (ref 7–25)
CALCIUM SPEC-SCNC: 9.4 MG/DL (ref 8.6–10.5)
CHLORIDE SERPL-SCNC: 103 MMOL/L (ref 98–107)
CHOLEST SERPL-MCNC: 127 MG/DL (ref 0–200)
CO2 SERPL-SCNC: 22 MMOL/L (ref 22–29)
CREAT SERPL-MCNC: 1.06 MG/DL (ref 0.76–1.27)
DEPRECATED RDW RBC AUTO: 44.1 FL (ref 37–54)
EGFRCR SERPLBLD CKD-EPI 2021: 75.5 ML/MIN/1.73
EOSINOPHIL # BLD AUTO: 0.1 10*3/MM3 (ref 0–0.4)
EOSINOPHIL NFR BLD AUTO: 2.3 % (ref 0.3–6.2)
ERYTHROCYTE [DISTWIDTH] IN BLOOD BY AUTOMATED COUNT: 12.8 % (ref 12.3–15.4)
FOLATE SERPL-MCNC: 16.2 NG/ML (ref 4.78–24.2)
GLOBULIN UR ELPH-MCNC: 2.6 GM/DL
GLUCOSE SERPL-MCNC: 112 MG/DL (ref 65–99)
HBA1C MFR BLD: 5.4 % (ref 4.8–5.6)
HCT VFR BLD AUTO: 46.1 % (ref 37.5–51)
HDLC SERPL-MCNC: 62 MG/DL (ref 40–60)
HGB BLD-MCNC: 16.3 G/DL (ref 13–17.7)
IMM GRANULOCYTES # BLD AUTO: 0.03 10*3/MM3 (ref 0–0.05)
IMM GRANULOCYTES NFR BLD AUTO: 0.7 % (ref 0–0.5)
LDLC SERPL CALC-MCNC: 49 MG/DL (ref 0–100)
LDLC/HDLC SERPL: 0.78 {RATIO}
LYMPHOCYTES # BLD AUTO: 0.8 10*3/MM3 (ref 0.7–3.1)
LYMPHOCYTES NFR BLD AUTO: 18.1 % (ref 19.6–45.3)
MCH RBC QN AUTO: 33.5 PG (ref 26.6–33)
MCHC RBC AUTO-ENTMCNC: 35.4 G/DL (ref 31.5–35.7)
MCV RBC AUTO: 94.7 FL (ref 79–97)
MONOCYTES # BLD AUTO: 0.44 10*3/MM3 (ref 0.1–0.9)
MONOCYTES NFR BLD AUTO: 10 % (ref 5–12)
NEUTROPHILS NFR BLD AUTO: 3.01 10*3/MM3 (ref 1.7–7)
NEUTROPHILS NFR BLD AUTO: 68.2 % (ref 42.7–76)
NRBC BLD AUTO-RTO: 0 /100 WBC (ref 0–0.2)
PLATELET # BLD AUTO: 154 10*3/MM3 (ref 140–450)
PMV BLD AUTO: 10.2 FL (ref 6–12)
POTASSIUM SERPL-SCNC: 4.4 MMOL/L (ref 3.5–5.2)
PROT SERPL-MCNC: 7.2 G/DL (ref 6–8.5)
PSA SERPL-MCNC: 2.61 NG/ML (ref 0–4)
RBC # BLD AUTO: 4.87 10*6/MM3 (ref 4.14–5.8)
SODIUM SERPL-SCNC: 138 MMOL/L (ref 136–145)
TRIGL SERPL-MCNC: 82 MG/DL (ref 0–150)
VLDLC SERPL-MCNC: 16 MG/DL (ref 5–40)
WBC NRBC COR # BLD AUTO: 4.41 10*3/MM3 (ref 3.4–10.8)

## 2025-08-11 PROCEDURE — 85025 COMPLETE CBC W/AUTO DIFF WBC: CPT

## 2025-08-11 PROCEDURE — 84153 ASSAY OF PSA TOTAL: CPT

## 2025-08-11 PROCEDURE — 36415 COLL VENOUS BLD VENIPUNCTURE: CPT

## 2025-08-11 PROCEDURE — 80053 COMPREHEN METABOLIC PANEL: CPT

## 2025-08-11 PROCEDURE — 80061 LIPID PANEL: CPT

## 2025-08-11 PROCEDURE — 82746 ASSAY OF FOLIC ACID SERUM: CPT

## 2025-08-11 PROCEDURE — 83036 HEMOGLOBIN GLYCOSYLATED A1C: CPT

## 2025-08-15 ENCOUNTER — OFFICE VISIT (OUTPATIENT)
Dept: INTERNAL MEDICINE | Age: 70
End: 2025-08-15
Payer: MEDICARE

## 2025-08-15 VITALS
BODY MASS INDEX: 37.83 KG/M2 | WEIGHT: 235.4 LBS | HEART RATE: 72 BPM | SYSTOLIC BLOOD PRESSURE: 170 MMHG | DIASTOLIC BLOOD PRESSURE: 89 MMHG | HEIGHT: 66 IN | OXYGEN SATURATION: 94 % | TEMPERATURE: 98.2 F

## 2025-08-15 DIAGNOSIS — E66.09 CLASS 2 OBESITY DUE TO EXCESS CALORIES WITHOUT SERIOUS COMORBIDITY WITH BODY MASS INDEX (BMI) OF 39.0 TO 39.9 IN ADULT: ICD-10-CM

## 2025-08-15 DIAGNOSIS — E11.65 TYPE 2 DIABETES MELLITUS WITH HYPERGLYCEMIA, WITHOUT LONG-TERM CURRENT USE OF INSULIN: ICD-10-CM

## 2025-08-15 DIAGNOSIS — G47.33 OSA ON CPAP: ICD-10-CM

## 2025-08-15 DIAGNOSIS — Z12.5 SCREENING PSA (PROSTATE SPECIFIC ANTIGEN): Primary | ICD-10-CM

## 2025-08-15 DIAGNOSIS — N40.1 BENIGN PROSTATIC HYPERPLASIA WITH WEAK URINARY STREAM: ICD-10-CM

## 2025-08-15 DIAGNOSIS — E78.2 MIXED HYPERLIPIDEMIA: ICD-10-CM

## 2025-08-15 DIAGNOSIS — C91.40 HAIRY CELL LEUKEMIA NOT HAVING ACHIEVED REMISSION: ICD-10-CM

## 2025-08-15 DIAGNOSIS — G45.9 TRANSIENT ISCHEMIC ATTACK: ICD-10-CM

## 2025-08-15 DIAGNOSIS — E55.9 VITAMIN D DEFICIENCY: ICD-10-CM

## 2025-08-15 DIAGNOSIS — D70.9 NEUTROPENIA, UNSPECIFIED TYPE: ICD-10-CM

## 2025-08-15 DIAGNOSIS — E29.1 HYPOGONADISM IN MALE: ICD-10-CM

## 2025-08-15 DIAGNOSIS — I10 PRIMARY HYPERTENSION: ICD-10-CM

## 2025-08-15 DIAGNOSIS — R73.01 IMPAIRED FASTING GLUCOSE: ICD-10-CM

## 2025-08-15 DIAGNOSIS — R39.12 BENIGN PROSTATIC HYPERPLASIA WITH WEAK URINARY STREAM: ICD-10-CM

## 2025-08-15 DIAGNOSIS — E66.812 CLASS 2 OBESITY DUE TO EXCESS CALORIES WITHOUT SERIOUS COMORBIDITY WITH BODY MASS INDEX (BMI) OF 39.0 TO 39.9 IN ADULT: ICD-10-CM

## 2025-08-15 RX ORDER — TADALAFIL 20 MG
20 TABLET ORAL
Qty: 30 TABLET | Refills: 3 | Status: SHIPPED | OUTPATIENT
Start: 2025-08-15